# Patient Record
Sex: MALE | Race: BLACK OR AFRICAN AMERICAN | Employment: FULL TIME | URBAN - METROPOLITAN AREA
[De-identification: names, ages, dates, MRNs, and addresses within clinical notes are randomized per-mention and may not be internally consistent; named-entity substitution may affect disease eponyms.]

---

## 2019-02-20 ENCOUNTER — OFFICE VISIT (OUTPATIENT)
Dept: FAMILY MEDICINE CLINIC | Facility: CLINIC | Age: 29
End: 2019-02-20

## 2019-02-20 VITALS
HEART RATE: 67 BPM | DIASTOLIC BLOOD PRESSURE: 85 MMHG | SYSTOLIC BLOOD PRESSURE: 133 MMHG | TEMPERATURE: 98 F | WEIGHT: 190.6 LBS | HEIGHT: 72 IN | RESPIRATION RATE: 16 BRPM | OXYGEN SATURATION: 99 % | BODY MASS INDEX: 25.81 KG/M2

## 2019-02-20 DIAGNOSIS — M25.562 LEFT KNEE PAIN, UNSPECIFIED CHRONICITY: Primary | ICD-10-CM

## 2019-02-20 NOTE — PATIENT INSTRUCTIONS
Knee Pain or Injury: Care Instructions Your Care Instructions Injuries are a common cause of knee problems. Sudden (acute) injuries may be caused by a direct blow to the knee. They can also be caused by abnormal twisting, bending, or falling on the knee. Pain, bruising, or swelling may be severe, and may start within minutes of the injury. Overuse is another cause of knee pain. Other causes are climbing stairs, kneeling, and other activities that use the knee. Everyday wear and tear, especially as you get older, also can cause knee pain. Rest, along with home treatment, often relieves pain and allows your knee to heal. If you have a serious knee injury, you may need tests and treatment. Follow-up care is a key part of your treatment and safety. Be sure to make and go to all appointments, and call your doctor if you are having problems. It's also a good idea to know your test results and keep a list of the medicines you take. How can you care for yourself at home? · Be safe with medicines. Read and follow all instructions on the label. ? If the doctor gave you a prescription medicine for pain, take it as prescribed. ? If you are not taking a prescription pain medicine, ask your doctor if you can take an over-the-counter medicine. · Rest and protect your knee. Take a break from any activity that may cause pain. · Put ice or a cold pack on your knee for 10 to 20 minutes at a time. Put a thin cloth between the ice and your skin. · Prop up a sore knee on a pillow when you ice it or anytime you sit or lie down for the next 3 days. Try to keep it above the level of your heart. This will help reduce swelling. · If your knee is not swollen, you can put moist heat, a heating pad, or a warm cloth on your knee. · If your doctor recommends an elastic bandage, sleeve, or other type of support for your knee, wear it as directed.  
· Follow your doctor's instructions about how much weight you can put on your leg. Use a cane, crutches, or a walker as instructed. · Follow your doctor's instructions about activity during your healing process. If you can do mild exercise, slowly increase your activity. · Reach and stay at a healthy weight. Extra weight can strain the joints, especially the knees and hips, and make the pain worse. Losing even a few pounds may help. When should you call for help? Call 911 anytime you think you may need emergency care. For example, call if: 
  · You have symptoms of a blood clot in your lung (called a pulmonary embolism). These may include: 
? Sudden chest pain. ? Trouble breathing. ? Coughing up blood.  
 Call your doctor now or seek immediate medical care if: 
  · You have severe or increasing pain.  
  · Your leg or foot turns cold or changes color.  
  · You cannot stand or put weight on your knee.  
  · Your knee looks twisted or bent out of shape.  
  · You cannot move your knee.  
  · You have signs of infection, such as: 
? Increased pain, swelling, warmth, or redness. ? Red streaks leading from the knee. ? Pus draining from a place on your knee. ? A fever.  
  · You have signs of a blood clot in your leg (called a deep vein thrombosis), such as: 
? Pain in your calf, back of the knee, thigh, or groin. ? Redness and swelling in your leg or groin.  
 Watch closely for changes in your health, and be sure to contact your doctor if: 
  · You have tingling, weakness, or numbness in your knee.  
  · You have any new symptoms, such as swelling.  
  · You have bruises from a knee injury that last longer than 2 weeks.  
  · You do not get better as expected. Where can you learn more? Go to http://bozena-quincy.info/. Enter K195 in the search box to learn more about \"Knee Pain or Injury: Care Instructions. \" Current as of: September 23, 2018 Content Version: 11.9 © 6940-2498 [employer].  Care instructions adapted under license by [address] (which disclaims liability or warranty for this information). If you have questions about a medical condition or this instruction, always ask your healthcare professional. [address] any warranty or liability for your use of this information.

## 2019-02-20 NOTE — PROGRESS NOTES
02/20/19 
9:49 AM 
had concerns including Establish Care (pt here to establish care. pt reports L knee pain. would like Ortho  referral ). HISTORY OF PRESENT ILLNESS This is a 34 y.o. male Left knee pain The patient states he is experiencing worsening pain in the left knee. This has been worsening over one month. Left knee surgery 2 years ago. He fell in the sidewalk breaking the femur, requiring reconstructive surgery. This occurred in [place]. Feels worse walking up and down the stairs, up more pronounced He has been using Tylenol. He must perform pulling and pushing activity  at work. No paresthesias are admitted to. He has been taking Tylenol and OTC  NSAIDS with partial relief. No other complaints are voiced Current Outpatient Medications:  
  acetaminophen (TYLENOL PO), Take  by mouth., Disp: , Rfl:  
No Known Allergies Active Ambulatory Problems Diagnosis Date Noted  No Active Ambulatory Problems Resolved Ambulatory Problems Diagnosis Date Noted  No Resolved Ambulatory Problems No Additional Past Medical History Review of Systems All other systems reviewed and are negative. No results found for this or any previous visit. Visit Vitals /85 (BP 1 Location: Right arm, BP Patient Position: Sitting) Pulse 67 Temp 98 °F (36.7 °C) (Oral) Resp 16 Ht 6' (1.829 m) Wt 190 lb 9.6 oz (86.5 kg) SpO2 99% BMI 25.85 kg/m² Physical Exam  
Constitutional: He is oriented to person, place, and time. No distress. HENT:  
Head: Normocephalic and atraumatic. Nose: Nose normal.  
Mouth/Throat: Oropharynx is clear and moist.  
Eyes: Conjunctivae and EOM are normal. Pupils are equal, round, and reactive to light. No scleral icterus. Neck: No tracheal deviation present. No thyromegaly present. Cardiovascular: Normal rate, regular rhythm, normal heart sounds and intact distal pulses. Pulmonary/Chest: Effort normal and breath sounds normal. No respiratory distress. Abdominal: Bowel sounds are normal. He exhibits no distension. There is no tenderness. Musculoskeletal: He exhibits tenderness. He exhibits no edema. Medial scar Flexion 180 to 200 Medial pain to rotation Lymphadenopathy:  
  He has no cervical adenopathy. Neurological: He is alert and oriented to person, place, and time. No cranial nerve deficit. Skin: He is not diaphoretic. Nursing note and vitals reviewed. MDM Number of Diagnoses or Management Options Left knee pain, unspecified chronicity:  
  
Amount and/or Complexity of Data Reviewed Tests in the radiology section of CPT®: ordered ASSESSMENT and PLAN 
  ICD-10-CM ICD-9-CM 1. Left knee pain, unspecified chronicity M25.562 719.46 REFERRAL TO ORTHOPEDICS  
 left knee X ray Advised Motrin for pain Ortho referral  
 
Follow-up Disposition: Not on File 
the following changes in treatment are made: Recommended Motrin, 400 mg up to 4 times a day for pain

## 2019-02-20 NOTE — PROGRESS NOTES
Shruthi Meyer is a 34 y.o. male here to establish care Shruthi Meyer is a 34 y.o. male (: 1990) presenting to address: Chief Complaint Patient presents with  Establish Care  
  pt here to establish care. pt reports L knee pain. would like Ortho  referral   
 
 
Vitals:  
 19 0945 Resp: 16 Temp: (!) 67 °F (19.4 °C) TempSrc: Oral  
SpO2: 99% Weight: 190 lb 9.6 oz (86.5 kg) Height: 6' (1.829 m) PainSc:   7 PainLoc: Knee Hearing/Vision: No exam data present Learning Assessment:  
 
Learning Assessment 2019 PRIMARY LEARNER Patient HIGHEST LEVEL OF EDUCATION - PRIMARY LEARNER  GRADUATED HIGH SCHOOL OR GED  
BARRIERS PRIMARY LEARNER NONE  
CO-LEARNER CAREGIVER No  
PRIMARY LANGUAGE ENGLISH  
LEARNER PREFERENCE PRIMARY VIDEOS  
ANSWERED BY patient RELATIONSHIP SELF Depression Screening:  
 
3 most recent PHQ Screens 2019 Little interest or pleasure in doing things Not at all Feeling down, depressed, irritable, or hopeless Not at all Total Score PHQ 2 0 Fall Risk Assessment:  
No flowsheet data found. Abuse Screening: No flowsheet data found. Coordination of Care Questionaire: 1. Have you been to the ER, urgent care clinic since your last visit? Hospitalized since your last visit? NO 
 
2. Have you seen or consulted any other health care providers outside of the [address] [place] [address] since your last visit? Include any pap smears or colon screening. NO Advanced Directive: 1. Do you have an Advanced Directive? NO 
 
2. Would you like information on Advanced Directives?  NO

## 2019-03-11 ENCOUNTER — OFFICE VISIT (OUTPATIENT)
Dept: ORTHOPEDIC SURGERY | Age: 29
End: 2019-03-11

## 2019-03-11 VITALS
RESPIRATION RATE: 16 BRPM | BODY MASS INDEX: 33.81 KG/M2 | OXYGEN SATURATION: 97 % | SYSTOLIC BLOOD PRESSURE: 134 MMHG | HEIGHT: 72 IN | HEART RATE: 82 BPM | WEIGHT: 249.6 LBS | DIASTOLIC BLOOD PRESSURE: 84 MMHG | TEMPERATURE: 97.6 F

## 2019-03-11 DIAGNOSIS — S83.512D SPRAIN OF ANTERIOR CRUCIATE LIGAMENT OF LEFT KNEE, SUBSEQUENT ENCOUNTER: ICD-10-CM

## 2019-03-11 DIAGNOSIS — M25.562 LEFT KNEE PAIN, UNSPECIFIED CHRONICITY: Primary | ICD-10-CM

## 2019-03-11 DIAGNOSIS — S83.412D SPRAIN OF MEDIAL COLLATERAL LIGAMENT OF LEFT KNEE, SUBSEQUENT ENCOUNTER: ICD-10-CM

## 2019-03-11 RX ORDER — IBUPROFEN 200 MG
TABLET ORAL
COMMUNITY
End: 2019-05-22 | Stop reason: ALTCHOICE

## 2019-03-11 NOTE — PATIENT INSTRUCTIONS
Please follow up after MRI. You are advised to contact us if your condition worsens. An MRI or CT has been ordered for you. A [facility] will be contacting you to schedule the appointment as soon as it has been approved with your insurance company. Please schedule an appointment to follow up with the doctor or the physicians assistant after the MRI or CT has been conducted. Knee Pain or Injury: Care Instructions  Your Care Instructions    Injuries are a common cause of knee problems. Sudden (acute) injuries may be caused by a direct blow to the knee. They can also be caused by abnormal twisting, bending, or falling on the knee. Pain, bruising, or swelling may be severe, and may start within minutes of the injury. Overuse is another cause of knee pain. Other causes are climbing stairs, kneeling, and other activities that use the knee. Everyday wear and tear, especially as you get older, also can cause knee pain. Rest, along with home treatment, often relieves pain and allows your knee to heal. If you have a serious knee injury, you may need tests and treatment. Follow-up care is a key part of your treatment and safety. Be sure to make and go to all appointments, and call your doctor if you are having problems. It's also a good idea to know your test results and keep a list of the medicines you take. How can you care for yourself at home? · Be safe with medicines. Read and follow all instructions on the label. ? If the doctor gave you a prescription medicine for pain, take it as prescribed. ? If you are not taking a prescription pain medicine, ask your doctor if you can take an over-the-counter medicine. · Rest and protect your knee. Take a break from any activity that may cause pain. · Put ice or a cold pack on your knee for 10 to 20 minutes at a time. Put a thin cloth between the ice and your skin.   · Prop up a sore knee on a pillow when you ice it or anytime you sit or lie down for the next 3 days. Try to keep it above the level of your heart. This will help reduce swelling. · If your knee is not swollen, you can put moist heat, a heating pad, or a warm cloth on your knee. · If your doctor recommends an elastic bandage, sleeve, or other type of support for your knee, wear it as directed. · Follow your doctor's instructions about how much weight you can put on your leg. Use a cane, crutches, or a walker as instructed. · Follow your doctor's instructions about activity during your healing process. If you can do mild exercise, slowly increase your activity. · Reach and stay at a healthy weight. Extra weight can strain the joints, especially the knees and hips, and make the pain worse. Losing even a few pounds may help. When should you call for help? Call 911 anytime you think you may need emergency care. For example, call if:    · You have symptoms of a blood clot in your lung (called a pulmonary embolism). These may include:  ? Sudden chest pain. ? Trouble breathing. ? Coughing up blood.    Call your doctor now or seek immediate medical care if:    · You have severe or increasing pain.     · Your leg or foot turns cold or changes color.     · You cannot stand or put weight on your knee.     · Your knee looks twisted or bent out of shape.     · You cannot move your knee.     · You have signs of infection, such as:  ? Increased pain, swelling, warmth, or redness. ? Red streaks leading from the knee. ? Pus draining from a place on your knee. ? A fever.     · You have signs of a blood clot in your leg (called a deep vein thrombosis), such as:  ? Pain in your calf, back of the knee, thigh, or groin. ?  Redness and swelling in your leg or groin.    Watch closely for changes in your health, and be sure to contact your doctor if:    · You have tingling, weakness, or numbness in your knee.     · You have any new symptoms, such as swelling.     · You have bruises from a knee injury that last longer than 2 weeks.     · You do not get better as expected. Where can you learn more? Go to http://bozena-quincy.info/. Enter K195 in the search box to learn more about \"Knee Pain or Injury: Care Instructions. \"  Current as of: September 23, 2018  Content Version: 11.9  © 1282-6035 [employer]. Care instructions adapted under license by [employer] (which disclaims liability or warranty for this information). If you have questions about a medical condition or this instruction, always ask your healthcare professional. [address] any warranty or liability for your use of this information.

## 2019-03-11 NOTE — PROGRESS NOTES
AMBULATORY PROGRESS NOTE      Patient: Paulina Dye             MRN: [id number]     SSN: [id number] Body mass index is 33.85 kg/m². YOB: 1990     AGE: 34 y.o. EX: male    PCP: No primary care provider on file. IMPRESSION/DIAGNOSIS AND TREATMENT PLAN     DIAGNOSES  1. Left knee pain, unspecified chronicity    2. Sprain of anterior cruciate ligament of left knee, subsequent encounter    3. Sprain of medial collateral ligament of left knee, subsequent encounter        Orders Placed This Encounter    [[id number]] Knee 1-2V    [place] LT [address] understands his diagnoses and the proposed plan. Mr. Lina Zamora is a 43-year-old male who about two years ago had left ligament reconstruction surgery by a surgeon in [place]. My impression is that he has a residual contracture of the left knee and cannot fully extend the left knee. It is about less than 5° that he lacks in full terminal extension and his flexion arc is only to about 105°. There is a little bit of clicking of the knee when I try to circumduct the knee with him sitting in front of me. I detect no gross instability to anterior drawer. When I perform a valgus stress test to stress the medial collateral ligamentous structures, there is a definite end point, but he has discomfort when I get to that endpoint. Because of his continued pain and discomfort and an inability to flex the knee fully and extend the knee fully, recommendation is for an MRI of his knee to assess the collateral ligamentous structures, to assess the reconstructed ligaments of the knee. He is going to sign a medical release form or bring some medical notes from what he had before, so I can see what exact type of procedures were performed for the MCL. DIAGNOSTIC STUDIES:  X-rays of his knee show postoperative changes from [facility] and ACL reconstruction.   There are two screws in the medial femoral condyle region, one in the lateral femoral condyle region and one with some hardware in the tibia as well. Otherwise, I see no subluxation or dislocation on these x-rays of the knee. Plan:    1) Obtain medical records and operative report for left knee. 2) MRI without IV Contrast of the left knee. 3) Referral to Dr. Lisa Perrin. 4) Continue activity modification as directed. 5) Work Note: Please allow Mr. Segun Velazquez to work light duty and/or reduced shifts (approximately 3 hours/shift or 15 hours/week) for the next 2 weeks, until March 25th. He is to refrain from bending, lifting, or twisting. RTO - after MRI     HPI AND EXAMINATION     Shanti Ramirez IS A 34 y.o. male who presents to my outpatient office complaining of left knee pain. Mr. Segun Velazquez reports that he has been experiencing left knee pain for the past 2 years. He reports that 2 years ago, he had to have knee reconstruction surgery after he tripped on loose gravel and twisted his left knee, sustained an MCL and ACL injury. He had the surgery in [place]. He reports that he did have PT after his surgery. Ever since his surgery, Mr. Segun Velazquez reports that has experienced lack of ROM, pain, and swelling. He notes that his knee often feels warm, but denies it ever turning red. He finds that it is difficult to walk for prolonged periods of time, and that he experiences shakiness and weakness in his knee when going up stairs. He reports that it feels as if his knee is going to give out when doing these activities. He states that his knee always hurts after work, as well. In addition to this, Mr. Segun Velazquez reports that a few weeks ago, he was at [employer] [facility] when he tripped and fell on his left knee. He reports that he experienced pain after this injury, but he did not go to the ED, as he had just gotten out of work. The patient used cryotherapy after his injury. The patient works at [email or web address].      Visit Vitals  /84   Pulse 82   Temp 97.6 °F (36.4 °C) (Oral)   Resp 16   Ht 6' (1.829 m)   Wt 249 lb 9.6 oz (113.2 kg)   SpO2 97%   BMI 33.85 kg/m²      Appearance: Alert, well appearing and pleasant patient who is in no distress, oriented to person, place/time, and who follows commands. This patient is accompanied in the       office by his  father. Psychiatric: Affect and mood are appropriate. Cardiovascular/Peripheral Vascular: Normal Pulses to each hand and foot           Knees:  Left        Gait: antalgic         Cutaneous: Skin intact, no abrasions, blisters, wounds, erythema             Well healed, remote surgical scar to the medial knee. Effusion: Is not present        Crepitus:  no PF joint crepitus       Tenderness: Medial joint line    Alignment of Knee: neutral when standing        ROM: Lacks 5-10 degrees of full, terminal extension    Can achieve 105 degrees of extension before guarding occurs    Cannot make figure 4 with left leg    Less than 5 degrees of flexion when laying down        Fullness or swelling: Swelling present to the knee        Stability: No instability to anterior, posterior, varus stress testing        Popping with valgus stress test.                    Good end point        Thrust: No varus thrust with gait        Contractures: No Achilles or Gastrocnemius Contractures. Calf tenderness: Absent for calf or gastrocnemius muscle regions            Soft, supple, non tender, non taut lower extremity compartments  Extremities:   No embolic phenomena to the toes          No significant edema to the foot and or toes. Edema is not present to distal 1/3 tib/fib or ankle regions.         Lower extremities are warm and appear well perfused    DVT: No evidence of DVT seen on examination at this time     No calf swelling, no tenderness to calf muscles  Lymphatic:  No Evidence of Lymphedema  Vascular: Medial Border of Tibia Region: Edema is not present        Pulses: Dorsalis Pedis &  Posterior Tibial Pulses : Palpable yes        Varicosities Lower Limbs : None noted . Neuro: Negative bilateral Straight leg raise (seated position)    See Musculoskeletal section for pertinent individual extremity examination    No abnormal hand/wrist, foot/ankle, or facial/neck tremors. Extensor mechanism is intact    CHART REVIEW     No past medical history on file. Current Outpatient Medications   Medication Sig    ibuprofen (MOTRIN IB) 200 mg tablet Take  by mouth.  acetaminophen (TYLENOL PO) Take  by mouth. No current facility-administered medications for this visit. No Known Allergies  Past Surgical History:   Procedure Laterality Date    HX ORTHOPAEDIC      L knee surgery      Social History     Occupational History    Not on file   Tobacco Use    Smoking status: Never Smoker    Smokeless tobacco: Never Used   Substance and Sexual Activity    Alcohol use: Yes     Comment: social     Drug use: Not on file    Sexual activity: Yes     Partners: Male     Family History   Problem Relation Age of Onset    Cancer Neg Hx     Diabetes Neg Hx     Heart Disease Neg Hx     Hypertension Neg Hx         REVIEW OF SYSTEMS : 3/11/2019  ALL BELOW ARE Negative except : SEE HPI     CONSTITUTIONAL: denies chills, fatigue, fever, weight change   PSYCH: denies anxiety, depression, irritability or mood swings   ENT: denies - headaches, hearing change, nasal congestion, oral lesions, or sore throat   HEM/ONC denies - bleeding problems, bruising, pallor or swollen lymph nodes   ENDO: denies hot flashes, polydipsia/polyuria or temperature intolerance   RESP: denies - cough, shortness of breath or wheezing   CV: denies - chest pain, edema or palpitations, ROMANO  GI: denies - abdominal pain, change in bowel habits, constipation, diarrhea or nausea/vomiting   : denies - dysuria, hematuria, incontinence, pelvic pain   MSK: denies  - See HPI.   NEURO: denies - confusion, headaches, seizures or weakness   DERM: denies - dry skin, hair changes, rash or skin lesion changes  VASCULAR: Peripheral Vascular: No calf pain, vascular vein tenderness to calf pain              No calf throbbing, posterior knee throbbing pain      DIAGNOSTIC IMAGING     No notes on file    Please see above section of this report. I have personally reviewed the results of the above study. The interpretation of this study is my professional opinion. Written by Briana Johnston, as dictated by Dr. Ike Oviedo. I, Dr. Ike Oviedo, confirm that all documentation is accurate.

## 2019-03-11 NOTE — LETTER
NOTIFICATION RETURN TO WORK / SCHOOL 
 
3/11/2019 11:26 AM 
 
Mr. Brady Naranjo 
[address] [address] To Whom It May Concern: 
 
Brady Naranjo is currently under the care of [address] [place] [address] Ulysses Parham. Please allow Mr. Vita Martinez to work light duty and/or reduced shifts (approximately 3 hours/shift or 15 hours/week) for the next 2 weeks, until March 25th. He is to refrain from bending, lifting, or twisting. If there are questions or concerns please have the patient contact our office.  
 
 
 
Sincerely, 
 
 
Nasim Moreno MD

## 2019-03-14 ENCOUNTER — TELEPHONE (OUTPATIENT)
Dept: ORTHOPEDIC SURGERY | Age: 29
End: 2019-03-14

## 2019-03-14 NOTE — TELEPHONE ENCOUNTER
LMOVM notifying patient that films are ready for  at [facility] location.

## 2019-03-14 NOTE — TELEPHONE ENCOUNTER
Patient came to B location to request a printed out copy of X-ray image, does not want disc. Please contact patient when image is ready to be picked up at [phone].

## 2019-03-21 ENCOUNTER — DOCUMENTATION ONLY (OUTPATIENT)
Dept: ORTHOPEDIC SURGERY | Age: 29
End: 2019-03-21

## 2019-03-22 ENCOUNTER — TELEPHONE (OUTPATIENT)
Dept: FAMILY MEDICINE CLINIC | Facility: CLINIC | Age: 29
End: 2019-03-22

## 2019-03-22 ENCOUNTER — HOSPITAL ENCOUNTER (OUTPATIENT)
Age: 29
Discharge: HOME | End: 2019-03-22
Attending: ORTHOPAEDIC SURGERY
Payer: COMMERCIAL

## 2019-03-22 DIAGNOSIS — M25.562 LEFT KNEE PAIN, UNSPECIFIED CHRONICITY: ICD-10-CM

## 2019-03-22 PROCEDURE — 73721 MRI JNT OF LWR EXTRE W/O DYE: CPT

## 2019-03-22 NOTE — TELEPHONE ENCOUNTER
Patient states he called facility where medical records were to be sent from and they said they have not received the request.  Please fax again.

## 2019-03-22 NOTE — TELEPHONE ENCOUNTER
Spoke with facility, fax number given. Medical release form re-faxed to facility.  Confirmation received

## 2019-03-22 NOTE — TELEPHONE ENCOUNTER
Patient returned nurse call. I informed him of the message below and advised the patient to contact his previous provider to ensure that they received the request and to see if he could get the request expedited. Patient will reach out to previous provider in [place] and update us as needed.

## 2019-04-02 ENCOUNTER — OFFICE VISIT (OUTPATIENT)
Dept: ORTHOPEDIC SURGERY | Age: 29
End: 2019-04-02

## 2019-04-02 VITALS
HEART RATE: 80 BPM | DIASTOLIC BLOOD PRESSURE: 78 MMHG | RESPIRATION RATE: 16 BRPM | WEIGHT: 252 LBS | HEIGHT: 72 IN | SYSTOLIC BLOOD PRESSURE: 135 MMHG | BODY MASS INDEX: 34.13 KG/M2 | OXYGEN SATURATION: 98 % | TEMPERATURE: 98 F

## 2019-04-02 DIAGNOSIS — S83.282A ACUTE LATERAL MENISCUS TEAR OF LEFT KNEE, INITIAL ENCOUNTER: Primary | ICD-10-CM

## 2019-04-02 DIAGNOSIS — M24.662 ARTHROFIBROSIS OF KNEE JOINT, LEFT: ICD-10-CM

## 2019-04-02 DIAGNOSIS — Z98.890 HISTORY OF REPAIR OF ANTERIOR CRUCIATE LIGAMENT OF LEFT KNEE: ICD-10-CM

## 2019-04-02 DIAGNOSIS — S83.242A ACUTE MEDIAL MENISCUS TEAR, LEFT, INITIAL ENCOUNTER: ICD-10-CM

## 2019-04-02 NOTE — PROGRESS NOTES
Xavier Reyes 1990 Chief Complaint Patient presents with  Knee Pain  
  left knee f/u HISTORY OF PRESENT ILLNESS Xavier Reyes is a 34 y.o. male who presents today for evaluation of left knee pain. The patient was referred by Dr. Jose Ortega. He rates his pain 6/10 today. Pain has been present for awhile. He states he has recurrent swelling. He states the knee is tight and feeling like its going to pop. He had surgery in July 2017 for ACL reconstruction. He recently got medial insurance back. He works at [employer]. Patient describes the pain as aching, sharp and dull that is Intermittent in nature. Symptoms are worse with walking and standing , Activity and is better with  Rest, Ice, Elevation. Associated symptoms include Swelling. Since problem started, it: has worsened. Pain does not wake patient up at night. Has taken no meds for the problem. Has tried following treatments: Injections:NO; Brace:NO; Therapy:YES; Cane/Crutch:NO No Known Allergies History reviewed. No pertinent past medical history. Social History Socioeconomic History  Marital status: UNKNOWN Spouse name: Not on file  Number of children: Not on file  Years of education: Not on file  Highest education level: Not on file Occupational History  Not on file Social Needs  Financial resource strain: Not on file  Food insecurity:  
  Worry: Not on file Inability: Not on file  Transportation needs:  
  Medical: Not on file Non-medical: Not on file Tobacco Use  Smoking status: Never Smoker  Smokeless tobacco: Never Used Substance and Sexual Activity  Alcohol use: Yes Comment: social   
 Drug use: Not on file  Sexual activity: Yes  
  Partners: Male Lifestyle  Physical activity:  
  Days per week: Not on file Minutes per session: Not on file  Stress: Not on file Relationships  Social connections: Talks on phone: Not on file Gets together: Not on file Attends Anabaptist service: Not on file Active member of club or organization: Not on file Attends meetings of clubs or organizations: Not on file Relationship status: Not on file  Intimate partner violence:  
  Fear of current or ex partner: Not on file Emotionally abused: Not on file Physically abused: Not on file Forced sexual activity: Not on file Other Topics Concern  Not on file Social History Narrative  Not on file Past Surgical History:  
Procedure Laterality Date  HX ORTHOPAEDIC    
 L knee surgery Family History Problem Relation Age of Onset  Cancer Neg Hx  Diabetes Neg Hx   
 Heart Disease Neg Hx  Hypertension Neg Hx Current Outpatient Medications Medication Sig  ibuprofen (MOTRIN IB) 200 mg tablet Take  by mouth.  acetaminophen (TYLENOL PO) Take  by mouth. No current facility-administered medications for this visit. REVIEW OF SYSTEM Patient denies: Weight loss, Fever/Chills, HA, Visual changes, Fatigue, Chest pain, SOB, Abdominal pain, N/V/D/C, Blood in stool or urine, Edema. Pertinent positive as above in HPI. All others were negative PHYSICAL EXAM:  
Visit Vitals /78 Pulse 80 Temp 98 °F (36.7 °C) (Oral) Resp 16 Ht 6' (1.829 m) Wt 252 lb (114.3 kg) SpO2 98% BMI 34.18 kg/m² The patient is a well-developed, well-nourished male   in no acute distress. The patient is alert and oriented times three. The patient is alert and oriented times three. Mood and affect are normal. 
LYMPHATIC: lymph nodes are not enlarged and are within normal limits SKIN: normal in color and non tender to palpation. There are no bruises or abrasions noted. NEUROLOGICAL: Motor sensory exam is within normal limits. Reflexes are equal bilaterally. There is normal sensation to pinprick and light touch MUSCULOSKELETAL: 
Examination Left knee Skin Intact Range of motion  Effusion + Medial joint line tenderness + Lateral joint line tenderness - Tenderness Pes Bursa - Tenderness insertion MCL - Tenderness insertion LCL - Bens -  
Patella crepitus + Patella grind + Lachman - Pivot shift - Anterior drawer - Posterior drawer -  
Varus stress -  
Valgus stress - Neurovascular Intact Calf Swelling and Tenderness to Palpation -  
Radha's Test -  
Hamstring Cord Tightness - IMAGING: MRI of left knee dated 3/22/19 was reviewed and read: Impression:  
1. Surgical changes of prior ACL and MCL reconstruction with tendon grafts which appear intact. 2. 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 3. Medial and lateral meniscal tears as discussed. 4. Low-grade chondrosis in the lateral compartment. IMPRESSION:   
  ICD-10-CM ICD-9-CM 1. Acute lateral meniscus tear of left knee, initial encounter S83.282A 836.1 2. Acute medial meniscus tear, left, initial encounter S83.242A 836.0 3. History of repair of anterior cruciate ligament of left knee Z98.890 V45.89   
4. Arthrofibrosis of knee joint, left M24.662 718.56 PLAN: 
1. I discussed the risks and benefits and potential adverse outcomes of both operative vs non operative treatment of left medial and lateral meniscus tear and arthrofibrosis of the knee with the patient. Patient wishes to proceed with arthroscopic left medial and lateral meniscectomy, lysis of adhesions, and manipulation under anesthesia. Risks of operative intervention include but not limited to bleeding, infection, deep vein thrombosis, pulmonary embolism, death, limb length discrepancy, reflexive sympathetic dystrophy, fat embolism syndrome,damage to blood vessels and nerves, malunion, non-union, delayed union, failure of hardware, post traumatic arthritis, stroke, heart attack, and death. Patient understands that infection may arise and may require numerous surgeries. History and physical exam scheduled for a later date. Risk factors include: n/a 2. No ultrasound exam indicated today 3. No cortisone injection indicated today 4. No Physical/Occupational Therapy indicated today 5. No diagnostic test indicated today: 6. No durable medical equipment indicated today 7. No referral indicated today 8. No medications indicated today: 9. No Narcotic indicated today RTC H&P Office note will be sent to referring provider. Scribed by Zen Mars ([address] [place] [address] [address]) as dictated by Ramiro Chow MD 
 
I, Dr. Ramiro Chow, confirm that all documentation is accurate.  
 
Ramiro Chow M.D.  
[facility]

## 2019-04-05 ENCOUNTER — HOSPITAL ENCOUNTER (OUTPATIENT)
Dept: PHYSICAL THERAPY | Age: 29
Discharge: HOME | End: 2019-04-05
Payer: COMMERCIAL

## 2019-04-05 PROCEDURE — 97161 PT EVAL LOW COMPLEX 20 MIN: CPT

## 2019-04-05 PROCEDURE — 97110 THERAPEUTIC EXERCISES: CPT

## 2019-04-05 NOTE — PROGRESS NOTES
PT DAILY TREATMENT NOTE/KNEE EVAL 10-18    Patient Name: Yecenia Cardenas  Date:2019  : 1990  [x]  Patient  Verified  Payor: [address] / Plan: [address] / Product Type: Managed Care Medicaid /    In time:215  Out time:255  Total Treatment Time (min): 40  Visit #: 1 of 10    Medicare/BCBS Only   Total Timed Codes (min):  0 1:1 Treatment Time:  0     Treatment Area: Pain in left knee [M25.562]    SUBJECTIVE  Pain Level (0-10 scale): 5  [x]constant []intermittent []improving []worsening [x]no change since onset  WORSE walking, bending, running, some times the way he sleeps BETTER lying down on his right side  Any medication changes, allergies to medications, adverse drug reactions, diagnosis change, or new procedure performed?: [x] No    [] Yes (see summary sheet for update)  Subjective functional status/changes:     PLOF: I all areas of ADLS and activities, no AD , working, household chores and community activities, [employer] activities  Limitations to PLOF: pain and decreased ROM  Mechanism of Injury: onset due to a fall 17, post op 17 with continued issues   Current symptoms/Complaints: 33 YO male diagnosed as above and with S/S consistent with above diagnosis presents to skilled outpatient PT. CCO pain in the left knee and unable to bend it all the way. Reports medial joint line pain with prolonged walking. The left knee feels like it wants to give out especially with attempts to try and run.    Previous Treatment/Compliance: ortho, surgery  ORIF- ACL and MCL repair, post op PT, MRI , motrin, ice  PMHx/Surgical Hx: none  Work Hx: part time [employer]  Living Situation: apartment 2nd floor, no elevator, alone  Pt Goals: to be able to bend my knee all the way and run again  Barriers: [x]pain []financial []time []transportation []other  Motivation: good  Substance use: []Alcohol []Tobacco []other:   FABQ Score: []low []elevate  Cognition: A & O x 4 Other: OBJECTIVE/EXAMINATION  Domestic Life: work and community activities, household chores, [employer]  Activity/Recreational Limitations: pain and limited motion  Mobility: I  Self Care: I        Modality rationale:     Min Type Additional Details    [] Estim:  []Unatt       []IFC  []Premod                        []Other:  []w/ice   []w/heat  Position:  Location:    [] Estim: []Att    []TENS instruct  []NMES                    []Other:  []w/US   []w/ice   []w/heat  Position:  Location:    []  Traction: [] Cervical       []Lumbar                       [] Prone          []Supine                       []Intermittent   []Continuous Lbs:  [] before manual  [] after manual    []  Ultrasound: []Continuous   [] Pulsed                           []1MHz   []3MHz Location:  W/cm2:    []  Iontophoresis with dexamethasone         Location: [] Take home patch   [] In clinic    []  Ice     []  heat  []  Ice massage  []  Laser   []  Anodyne Position:  Location:    []  Laser with stim  []  Other: Position:  Location:    []  Vasopneumatic Device Pressure:       [] lo [] med [] hi   Temperature: [] lo [] med [] hi   [] Skin assessment post-treatment:  []intact []redness- no adverse reaction    []redness - adverse reaction:     32 min [x]Eval                  []Re-Eval       8 min Therapeutic Exercise:  [x] See flow sheet :   Rationale: increase ROM, increase strength and improve coordination to improve the patients ability to aid with increase tolerance to ADLS and activities     min Therapeutic Activity:  []  See flow sheet :   Rationale:   to improve the patients ability to       min Neuromuscular Re-education:  []  See flow sheet :   Rationale:   to improve the patients ability to      min Manual Therapy:     Rationale:  to      min Gait Training:  ___ feet with ___ device on level surfaces with ___ level of assist   Rationale:           With   [] TE   [] TA   [] neuro   [] other: Patient Education: [x] Review HEP    [] Progressed/Changed HEP based on:   [] positioning   [] body mechanics   [] transfers   [] heat/ice application    [] other:      Other Objective/Functional Measures: FALLS RISK is low, no AD use, no LOB observed. Some fear of falling due to a recent slip and fall on a wet floor and also due to left knee giving out at times.      Physical Therapy Evaluation - Knee    Posture: [] Varus    [] Valgus    [] Recurvatum        [] Tibial Torsion    [] Foot Supination    [] Foot Pronation    Describe:    Gait:  [] Normal    [] Abnormal    [] Antalgic    [] NWB    Device:    Describe:    ROM / Strength  [] Unable to assess                  AROM                      PROM                   Strength (1-5)    Left Right Left Right Left Right   Hip Flexion [facility]   4 4+    Extension          Abduction          Adduction         Knee Flexion 83 sitting  103 supine 110 sitting   4 5    Extension -20 sitting  18 supine 0 sitting   4 5   Ankle Plantarflexion          Dorsiflexion             Flexibility: [] Unable to assess at this time  Hamstrings:    (L) Tightness= [] WNL   [] Min   [] Mod   [] Severe    (R) Tightness= [] WNL   [] Min   [] Mod   [] Severe  Quadriceps:    (L) Tightness= [] WNL   [] Min   [] Mod   [] Severe    (R) Tightness= [] WNL   [] Min   [] Mod   [] Severe  Gastroc:      (L) Tightness= [] WNL   [] Min   [] Mod   [] Severe    (R) Tightness= [] WNL   [] Min   [] Mod   [] Severe  Other:    Palpation: point tender to palpation medial joint line left knee   Neg/Pos  Neg/Pos  Neg/Pos   Joint Line  Quad tendon  Patellar ligament    Patella  Fibular head  Pes Anserinus    Tibial tubercle  Hamstring tendons  Infrapatellar fat pad      Optional Tests:  Patellar Positioning (Static)   []L []R Normal []L []R Lateral   []L []R Parvin Nim      []L []R Medial   []L []R Baja    Patellar Tracking   []L []R Glide (Lat)   []L []R Tilt (Lat)     []L []R Glide (Med)  []L []R Tilt (Med)      []L []R Tile (Inf)     Patellar Mobility   []L []R Hypermobile [x]L []R Hypomobile         Girth Measurements:     Cm at  Cm above joint line   Cm at   Cm below joint line  Cm at joint line   Left        Right           Lachmans  [] Neg    [] Pos Posterior Drawer [] Neg    [] Pos  Pivot Shift  [] Neg    [] Pos Posterior Sag  [] Neg    [] Pos  DANDRE   [] Neg    [] Pos Shaun's Test [] Neg    [] Pos  ALRI   [] Neg    [] Pos Squat   [] Neg    [] Pos  Valgus@ 0 Degrees [] Neg    [] Pos Ben-Shemar [] Neg    [] Pos  Valgus@ 30 Degrees [] Neg    [] Pos Patellar Apprehension [] Neg    [] Pos  Varus@ 0 Degrees [] Neg    [] Pos Granados's Compression [] Neg    [] Pos  Varus@ 30 Degrees [] Neg    [] Pos Ely's Test  [] Neg    [] Pos  Apley's Compression [] Neg    [] Pos Zara's Test  [] Neg    [] Pos  Apley's Distraction [] Neg    [] Pos Stroke Test  [] Neg    [] Pos   Anterior Drawer [] Neg    [] Pos Fluctuation Test [] Neg    [] Pos  Other:                  [] Neg    [] Pos                 Other tests/comments: quad set right good, left fair  Crepitus left minimal, right none       Pain Level (0-10 scale) post treatment: 5    ASSESSMENT/Changes in Function: Patient demonstrates the potential to make gains with improved ROM, strength, endurance/activity tolerance, functional FOTO survey score  and all within a reasonable time frame so as to increase their functional independence with ADLs and activities for carryover to  Improved quality of life, tolerance to household chores and community activities, and work demands. Patient requires skilled Physical Therapy so as to monitor their response to and modify their treatment plan accordingly. Patient appears to be an appropriate candidate for skilled outpatient Physical Therapy.     Patient will continue to benefit from skilled PT services to modify and progress therapeutic interventions, address functional mobility deficits, address ROM deficits, address strength deficits, analyze and address soft tissue restrictions, analyze and cue movement patterns, analyze and modify body mechanics/ergonomics, assess and modify postural abnormalities and instruct in home and community integration to attain remaining goals.      [x]  See Plan of Care  []  See progress note/recertification  []  See Discharge Summary         Progress towards goals / Updated goals:       PLAN  [x]  Upgrade activities as tolerated     [x]  Continue plan of care  []  Update interventions per flow sheet       []  Discharge due to:_  []  Other:_      Ada Kennedy, PT 4/5/2019  2:04 PM

## 2019-04-05 NOTE — PROGRESS NOTES
[facility] [address], [address] [place] [address], [address]  [phone] fax      Plan of Care/ Statement of Necessity for Physical Therapy Services    Patient name: Bailey Caballero Start of Care: 2019   Referral source: Angel Luis Laura,* : 1990    Medical Diagnosis: Pain in left knee [M25.562]  Payor: [address] [place] [address] Road / Plan: [address] / Product Type: Managed Care Medicaid /  Onset Date:2017, P/O 2019    Treatment Diagnosis: decrease tolerance to ADLs and activities due to left knee pain, LOM, decrease strength     Prior Hospitalization: see medical history Provider#: [id number]   Medications: Verified on Patient summary List    Comorbidities: none with exception left knee surgery   Prior Level of Function:  I all areas of ADLS and activities, no AD , working, household chores and community activities, [employer] activities   The Plan of Care and following information is based on the information from the initial evaluation. Assessment/ key information:  33 YO male diagnosed as above and with S/S consistent with above diagnosis presents to skilled outpatient PT. CCO pain in the left knee and unable to bend it all the way. Reports medial joint line pain with prolonged walking. The left knee feels like it wants to give out especially with attempts to try and run. Previous Treatment/Compliance: ortho, surgery  ORIF- ACL and MCL repair, post op PT, MRI , motrin, ice  Pain 5, FOTO 47, I ambulation, no AD use. FALLS RISK is low, no AD use, no LOB observed. Some fear of falling due to a recent slip and fall on a wet floor and also due to left knee giving out at times.    ROM / Strength  [] Unable to assess                  AROM                      PROM                   Strength (1-5)      Left Right Left Right Left Right   Hip Flexion [facility]     4 4+     Extension                 Abduction                 Adduction               Knee Flexion 83 sitting  103 supine 110 sitting     4 5     Extension -20 sitting  18 supine 0 sitting     4 5   Ankle Plantarflexion                 Dorsiflexion                   point tender to palpation medial joint line left knee  Left patella hypomobile. quad set right good, left fair  Crepitus left minimal, right none                Patient demonstrates the potential to make gains with improved ROM, strength, endurance/activity tolerance, functional FOTO survey score  and all within a reasonable time frame so as to increase their functional independence with ADLs and activities for carryover to  Improved quality of life, tolerance to household chores and community activities, and work demands. Patient requires skilled Physical Therapy so as to monitor their response to and modify their treatment plan accordingly.  Patient appears to be an appropriate candidate for skilled outpatient Physical Therapy  Evaluation Complexity History MEDIUM  Complexity : 1-2 comorbidities / personal factors will impact the outcome/ POC ; Examination MEDIUM Complexity : 3 Standardized tests and measures addressing body structure, function, activity limitation and / or participation in recreation  ;Presentation LOW Complexity : Stable, uncomplicated  ;Clinical Decision Making MEDIUM Complexity : FOTO score of 26-74  Overall Complexity Rating: LOW   Problem List: pain affecting function, decrease ROM, decrease strength, edema affecting function, impaired gait/ balance, decrease ADL/ functional abilitiies, decrease activity tolerance, decrease flexibility/ joint mobility, decrease transfer abilities and other FOTO 47   Treatment Plan may include any combination of the following: Therapeutic exercise, Therapeutic activities, Neuromuscular re-education, Physical agent/modality, Gait/balance training, Manual therapy, Patient education, Self Care training, Functional mobility training, Home safety training and Stair training  Patient / Family readiness to learn indicated by: asking questions, trying to perform skills and interest  Persons(s) to be included in education: patient (P)  Barriers to Learning/Limitations: None  Patient Goal (s):  to be able to bend my knee all the way and run again      Patient Self Reported Health Status: excellent  Rehabilitation Potential: good    Short Term Goals: To be accomplished in 5 treatments:   1 patient will have established and be I with HEP to aid with progression of skilled PT program   EVAL issued   CURRENT   2 patient will have pain 3/10 to aid with increase tolerance to work demands   EVAL 5   CURRENT  Long Term Goals: To be accomplished in 10 treatments:   1 patient will have pain 1-2/10 to aid with increase tolerance to work demands   EVAL 5   CURRENT   2 patient will have MMT Left knee F/E to aid with increase stability for walking and standing   EVAL instability reports and MMT left knee F/E 4/4   CURRENT   3 patient will have reports of 50% improvement for carryover to ascending/descending 1 flight of stairs   EVAL quite a bit of diffiiculty   CURRENT   4 patient will have FOTO 68 to show improved tolerance to regular work and household activities   EVAL 47 a little bit of difficulty   CURRENT  Frequency / Duration: Patient to be seen 2-3 times per week for 10 treatments. Patient/ Caregiver education and instruction: Diagnosis, prognosis, self care, activity modification and exercises   [x]  Plan of care has been reviewed with PTA    Sarah Aquino, PT 4/5/2019 2:14 PM  ________________________________________________________________________    I certify that the above Therapy Services are being furnished while the patient is under my care. I agree with the treatment plan and certify that this therapy is necessary.     Physician's Signature:____________Date:_________TIME:________    [employer], Date and Time must be completed for valid certification **      Please sign and return to [facility] [address]  [address] [place] [address], [address] [place] [address], [address]  [phone] fax

## 2019-04-19 ENCOUNTER — HOSPITAL ENCOUNTER (OUTPATIENT)
Dept: PHYSICAL THERAPY | Age: 29
Discharge: HOME | End: 2019-04-19
Payer: COMMERCIAL

## 2019-04-19 PROCEDURE — 97110 THERAPEUTIC EXERCISES: CPT

## 2019-04-19 PROCEDURE — 97140 MANUAL THERAPY 1/> REGIONS: CPT

## 2019-04-19 PROCEDURE — 97016 VASOPNEUMATIC DEVICE THERAPY: CPT

## 2019-04-23 ENCOUNTER — APPOINTMENT (OUTPATIENT)
Dept: PHYSICAL THERAPY | Age: 29
End: 2019-04-23
Payer: COMMERCIAL

## 2019-04-26 ENCOUNTER — HOSPITAL ENCOUNTER (OUTPATIENT)
Dept: PHYSICAL THERAPY | Age: 29
Discharge: HOME | End: 2019-04-26
Payer: COMMERCIAL

## 2019-04-26 PROCEDURE — 97110 THERAPEUTIC EXERCISES: CPT | Performed by: PHYSICAL THERAPIST

## 2019-04-26 PROCEDURE — 97140 MANUAL THERAPY 1/> REGIONS: CPT | Performed by: PHYSICAL THERAPIST

## 2019-04-26 PROCEDURE — 97530 THERAPEUTIC ACTIVITIES: CPT | Performed by: PHYSICAL THERAPIST

## 2019-04-26 NOTE — PROGRESS NOTES
PT DAILY TREATMENT NOTE 10-18    Patient Name: Elieser Velasco  Date:2019  : 1990  [x]  Patient  Verified  Payor: [address] / Plan: [address] / Product Type: Managed Care Medicaid /    In time:2:12P  Out time:2:40  Total Treatment Time (min): 30 min  Visit #: 3 of 10    Medicare/BCBS Only   Total Timed Codes (min):   1:1 Treatment Time:         Treatment Area: Pain in left knee [M25.562]    SUBJECTIVE  Pain Level (0-10 scale): 5/10  Any medication changes, allergies to medications, adverse drug reactions, diagnosis change, or new procedure performed?: [x] No    [] Yes (see summary sheet for update)  Subjective functional status/changes:   [] No changes reported  Patient states he has constant pain of 5/10 pain. Discussed ongoing injury and perception of pain. OBJECTIVE    Modality rationale: decrease edema and decrease inflammation to improve the patients ability to increase activity tolerance.    Min Type Additional Details    [] Estim:  []Unatt       []IFC  []Premod                        []Other:  []w/ice   []w/heat  Position:  Location:    [] Estim: []Att    []TENS instruct  []NMES                    []Other:  []w/US   []w/ice   []w/heat  Position:  Location:    []  Traction: [] Cervical       []Lumbar                       [] Prone          []Supine                       []Intermittent   []Continuous Lbs:  [] before manual  [] after manual    []  Ultrasound: []Continuous   [] Pulsed                           []1MHz   []3MHz W/cm2:  Location:    []  Iontophoresis with dexamethasone         Location: [] Take home patch   [] In clinic   10 [x]  Ice     []  heat  []  Ice massage  []  Laser   []  Anodyne Position: long sit  Location: L knee    []  Laser with stim  []  Other:  Position:  Location:    []  Vasopneumatic Device Pressure:       [] lo [] med [] hi   Temperature: [] lo [] med [] hi   [] Skin assessment post-treatment:  []intact []redness- no adverse reaction    []redness - adverse reaction:     10 min Therapeutic Exercise:  [x] See flow sheet :   Rationale: increase ROM and increase strength to improve the patients ability to increase A/ROM and decrease pain with movement. 10 min Therapeutic Activity:  [x]  See flow sheet :   Rationale: increase strength and improve coordination  to improve the patients ability to Tolerate basic ADLs and job-related tasks without pain. 10 min Manual Therapy:  Grade 4 patello-femoral mobs   Rationale: increase tissue extensibility and correct positional vertigo to perform activities with good form and proprioception with tactile and verbal cuing appropriately. With   [x] TE   [x] TA   [] neuro   [] other: Patient Education: [x] Review HEP    [x] Progressed/Changed HEP based on:   [x] positioning   [x] body mechanics   [] transfers   [] heat/ice application    [] other:      Other Objective/Functional Measures: hypomobility of L patella compared to R      Pain Level (0-10 scale) post treatment: 3/10    ASSESSMENT/Changes in Function: Patient continues to have persistent pain and limits self based on this with elevated fear avoidance belief. Encouraged his to get back to cardio at the gym at a consistent frequency. Patient will continue to benefit from skilled PT services to modify and progress therapeutic interventions, address functional mobility deficits, address ROM deficits, analyze and address soft tissue restrictions, analyze and cue movement patterns, analyze and modify body mechanics/ergonomics and assess and modify postural abnormalities to attain remaining goals.      [x]  See Plan of Care  []  See progress note/recertification  []  See Discharge Summary         Progress towards goals / Updated goals:  Short Term Goals: To be accomplished in 5 treatments:               1 patient will have established and be I with HEP to aid with progression of skilled PT program               EVAL issued               ZQQTHWD: Met- pt reports compliance 4/19/19, 4/26/19               2 patient will have pain 3/10 to aid with increase tolerance to work demands               EVAL 5               CURRENT 5/10 4/26/19  Long Term Goals: To be accomplished in 10 treatments:               1 patient will have pain 1-2/10 to aid with increase tolerance to work demands               EVAL 5               CURRENT               2 patient will have MMT Left knee F/E to aid with increase stability for walking and standing               EVAL instability reports and MMT left knee F/E 4/4               CURRENT               3 patient will have reports of 50% improvement for carryover to ascending/descending 1 flight of stairs               EVAL quite a bit of diffiiculty               FESGHLE               4 patient will have FOTO 68 to show improved tolerance to regular work and household activities               EVAL 47 a little bit of difficulty   Current:    PLAN  [x]  Upgrade activities as tolerated     []  Continue plan of care  []  Update interventions per flow sheet       []  Discharge due to:_  []  Other:_      Terrance Carter, PT 4/26/2019  3:12 PM    Future Appointments   Date Time Provider Duane Jacobs   5/3/2019  3:00 PM Anu Keller PTA [facility]   5/10/2019  2:00 PM Anu Keller PTA [facility]   5/14/2019 10:40 AM My Ball MD [facility] 69   5/20/2019  9:30 AM Kerry Ochoa MD [facility] 10.

## 2019-05-03 ENCOUNTER — HOSPITAL ENCOUNTER (OUTPATIENT)
Dept: PHYSICAL THERAPY | Age: 29
Discharge: HOME | End: 2019-05-03
Payer: COMMERCIAL

## 2019-05-03 PROCEDURE — 97110 THERAPEUTIC EXERCISES: CPT

## 2019-05-03 PROCEDURE — 97016 VASOPNEUMATIC DEVICE THERAPY: CPT

## 2019-05-03 PROCEDURE — 97140 MANUAL THERAPY 1/> REGIONS: CPT

## 2019-05-03 NOTE — PROGRESS NOTES
PT DAILY TREATMENT NOTE 10-18    Patient Name: Meagan Barth  Date:5/3/2019  : 1990  [x]  Patient  Verified  Payor: [address] [place] [address] Road / Plan: [address] / Product Type: Managed Care Medicaid /    In time:2:06  Out time:2:47  Total Treatment Time (min): 41  Visit #: 4 of 10    Medicare/BCBS Only   Total Timed Codes (min):  31 1:1 Treatment Time:  31       Treatment Area: Pain in left knee [M25.562]    SUBJECTIVE  Pain Level (0-10 scale): 5  Any medication changes, allergies to medications, adverse drug reactions, diagnosis change, or new procedure performed?: [x] No    [] Yes (see summary sheet for update)  Subjective functional status/changes:   [] No changes reported  States he is still having the pain    OBJECTIVE    Modality rationale: decrease edema, decrease inflammation and decrease pain to improve the patients ability to ease with tolerance to ADLs   Min Type Additional Details    [] Estim:  []Unatt       []IFC  []Premod                        []Other:  []w/ice   []w/heat  Position:  Location:    [] Estim: []Att    []TENS instruct  []NMES                    []Other:  []w/US   []w/ice   []w/heat  Position:  Location:    []  Traction: [] Cervical       []Lumbar                       [] Prone          []Supine                       []Intermittent   []Continuous Lbs:  [] before manual  [] after manual    []  Ultrasound: []Continuous   [] Pulsed                           []1MHz   []3MHz W/cm2:  Location:    []  Iontophoresis with dexamethasone         Location: [] Take home patch   [] In clinic    []  Ice     []  heat  []  Ice massage  []  Laser   []  Anodyne Position:  Location:    []  Laser with stim  []  Other:  Position:  Location:   10 [x]  Vasopneumatic Device Pressure:       [] lo [] med [x] hi   Temperature: [] lo [x] med [] hi   [] Skin assessment post-treatment:  []intact []redness- no adverse reaction    []redness - adverse reaction:       23 min Therapeutic Exercise:  [x] See flow sheet :   Rationale: increase ROM, increase strength and increase proprioception to improve the patients ability to perform daily household chores. 8 min Manual Therapy:  Patellar mobs , passive ROM with overstretch flexion/ extension   Rationale: decrease pain, increase ROM and increase tissue extensibility to assist with community ambulation              With   [] TE   [] TA   [] neuro   [] other: Patient Education: [x] Review HEP    [] Progressed/Changed HEP based on:   [] positioning   [] body mechanics   [] transfers   [] heat/ice application    [] other:      Other Objective/Functional Measures:   Performed all therex well  Added leg press with good tolerance and proper form , 70 #, states he does about 70 at the gym    Pain Level (0-10 scale) post treatment: <5    ASSESSMENT/Changes in Function: Patient continues to show improvements with signs/ symptoms however still demonstrates a decrease in strength, impaired ROM, deficits with balance and an increase in pain with functional activities. Patient will continue to benefit from skilled PT services to modify and progress therapeutic interventions, address functional mobility deficits, address strength deficits, analyze and cue movement patterns and analyze and modify body mechanics/ergonomics to attain remaining goals.      [x]  See Plan of Care  []  See progress note/recertification  []  See Discharge Summary         Progress towards goals / Updated goals:  Short Term Goals: To be accomplished in 5 treatments:               1 patient will have established and be I with HEP to aid with progression of skilled PT program               EVAL issued               FXRLSUO: Met- pt reports compliance 4/19/19, 4/26/19               2 patient will have pain 3/10 to aid with increase tolerance to work demands               EVAL 5               PHYQZFE 5/10 4/26/19  Long Term Goals: To be accomplished in 10 treatments:               1 patient will have pain 1-2/10 to aid with increase tolerance to work demands               EVAL 5               CURRENT               2 patient will have MMT Left knee F/E to aid with increase stability for walking and standing               EVAL instability reports and MMT left knee F/E 4/4               CURRENT               3 patient will have reports of 50% improvement for carryover to ascending/descending 1 flight of stairs               EVAL quite a bit of diffiiculty               HACYUKF               4 patient will have FOTO 68 to show improved tolerance to regular work and household activities               EVAL 47 a little bit of difficulty              Current:        PLAN  [x]  Upgrade activities as tolerated     [x]  Continue plan of care  []  Update interventions per flow sheet       []  Discharge due to:_  []  Other:_      Jammie Lord, PT 5/3/2019  2:06 PM    Future Appointments   Date Time Provider Duane Dickersonisti   5/10/2019  2:00 PM Briana Hi PTA [facility]   5/14/2019 10:40 AM Shukri Farrell MD [place] [id number]   5/20/2019  9:30 AM Karena Coreas MD [facility] 10.

## 2019-05-14 ENCOUNTER — OFFICE VISIT (OUTPATIENT)
Dept: ORTHOPEDIC SURGERY | Age: 29
End: 2019-05-14

## 2019-05-14 VITALS
TEMPERATURE: 96.2 F | BODY MASS INDEX: 34.59 KG/M2 | DIASTOLIC BLOOD PRESSURE: 83 MMHG | SYSTOLIC BLOOD PRESSURE: 133 MMHG | HEART RATE: 76 BPM | OXYGEN SATURATION: 100 % | WEIGHT: 255.4 LBS | HEIGHT: 72 IN | RESPIRATION RATE: 11 BRPM

## 2019-05-14 DIAGNOSIS — S83.282D ACUTE LATERAL MENISCUS TEAR OF LEFT KNEE, SUBSEQUENT ENCOUNTER: Primary | ICD-10-CM

## 2019-05-14 DIAGNOSIS — Z98.890 HISTORY OF REPAIR OF ANTERIOR CRUCIATE LIGAMENT OF LEFT KNEE: ICD-10-CM

## 2019-05-14 DIAGNOSIS — S83.242D ACUTE MEDIAL MENISCUS TEAR, LEFT, SUBSEQUENT ENCOUNTER: ICD-10-CM

## 2019-05-14 DIAGNOSIS — M24.662 ARTHROFIBROSIS OF KNEE JOINT, LEFT: ICD-10-CM

## 2019-05-14 NOTE — PROGRESS NOTES
1. Have you been to the ER, urgent care clinic since your last visit? Hospitalized since your last visit? Yes When: WEEK AGO Where: URGENT CARE Reason for visit: SPASM IN NECK 2. Have you seen or consulted any other health care providers outside of the [address] [place] [address] since your last visit? Include any pap smears or colon screening.  No

## 2019-05-14 NOTE — PROGRESS NOTES
Xavier Reyes 1990 Chief Complaint Patient presents with  Knee Pain LEFT KNEE PAIN  
  
 
HISTORY OF PRESENT ILLNESS Xavier Reyes is a 34 y.o. male who presents today for reevaluation of left knee pain. Patient rates pain as 6/10 today. Pain has been present for awhile. He states he has recurrent swelling. He states the knee is tight and feeling like its going to pop. He had surgery in July 2017 for ACL reconstruction. He recently got medial insurance back. He works at [employer]. He has been attending PT which has not helped much. He is requesting a cane today. He states he is not mentally ready for surgery at this time. Patient denies any fever, chills, chest pain, shortness of breath or calf pain. The remainder of the review of systems is negative. There are no new illness or injuries to report since last seen in the office. There are no changes to medications, allergies, family or social history. Pain Assessment  5/14/2019 Location of Pain Knee Location Modifiers Left Severity of Pain 6 Quality of Pain Aching Duration of Pain Persistent Frequency of Pain Constant Aggravating Factors Walking;Standing Limiting Behavior -  
Relieving Factors Rest;Elevation Result of Injury No  
Work-Related Injury - Type of Injury -  
 
PHYSICAL EXAM:  
Visit Vitals /83 Pulse 76 Temp 96.2 °F (35.7 °C) (Oral) Resp 11 Ht 6' (1.829 m) Wt 255 lb 6.4 oz (115.8 kg) SpO2 100% BMI 34.64 kg/m² The patient is a well-developed, well-nourished male   in no acute distress. The patient is alert and oriented times three. The patient is alert and oriented times three. Mood and affect are normal. 
LYMPHATIC: lymph nodes are not enlarged and are within normal limits SKIN: normal in color and non tender to palpation. There are no bruises or abrasions noted. NEUROLOGICAL: Motor sensory exam is within normal limits.  Reflexes are equal bilaterally. There is normal sensation to pinprick and light touch MUSCULOSKELETAL: 
Examination Left knee Skin Intact Range of motion  Effusion + Medial joint line tenderness + Lateral joint line tenderness - Tenderness Pes Bursa - Tenderness insertion MCL - Tenderness insertion LCL - Bens -  
Patella crepitus + Patella grind + Lachman - Pivot shift - Anterior drawer - Posterior drawer -  
Varus stress -  
Valgus stress - Neurovascular Intact Calf Swelling and Tenderness to Palpation -  
Radha's Test -  
Hamstring Cord Tightness - IMAGING: MRI of left knee dated 3/22/19 was reviewed and read: Impression:  
1. Surgical changes of prior ACL and MCL reconstruction with tendon grafts which appear intact. 2. 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 3. Medial and lateral meniscal tears as discussed. 4. Low-grade chondrosis in the lateral compartment. IMPRESSION:   
  ICD-10-CM ICD-9-CM 1. Acute lateral meniscus tear of left knee, subsequent encounter S83.282D V58.89 AMB SUPPLY ORDER  
  836.1 2. Acute medial meniscus tear, left, subsequent encounter S83.242D V58.89 AMB SUPPLY ORDER  
  836.0 3. History of repair of anterior cruciate ligament of left knee Z98.890 V45.89 AMB SUPPLY ORDER  
4. Arthrofibrosis of knee joint, left M24.662 718.56 AMB SUPPLY ORDER  
  
 
PLAN:  
1. The patient presents today with left knee pain due to meniscus tear and arthrofibrosis of the knee. We will order a cane. Discussed the need for surgery in the future. Risk factors include: n/a 2. No ultrasound exam indicated today 3. No cortisone injection indicated today 4. No Physical/Occupational Therapy indicated today 5. No diagnostic test indicated today:  
6. Yes durable medical equipment indicated today CANE 7. No referral indicated today 8. No medications indicated today: 9. No Narcotic indicated today RTC prn 
 
Scribed by Mahogany Andres ([address]) as dictated by MD TA Lorenz, Dr. Marv Bell, confirm that all documentation is accurate.  
 
Marv Bell M.D.  
[facility]

## 2019-05-22 ENCOUNTER — OFFICE VISIT (OUTPATIENT)
Dept: FAMILY MEDICINE CLINIC | Facility: CLINIC | Age: 29
End: 2019-05-22

## 2019-05-22 ENCOUNTER — TELEPHONE (OUTPATIENT)
Dept: FAMILY MEDICINE CLINIC | Facility: CLINIC | Age: 29
End: 2019-05-22

## 2019-05-22 VITALS
OXYGEN SATURATION: 96 % | HEIGHT: 72 IN | SYSTOLIC BLOOD PRESSURE: 132 MMHG | BODY MASS INDEX: 34.02 KG/M2 | HEART RATE: 97 BPM | RESPIRATION RATE: 16 BRPM | WEIGHT: 251.2 LBS | DIASTOLIC BLOOD PRESSURE: 90 MMHG | TEMPERATURE: 97 F

## 2019-05-22 DIAGNOSIS — M25.562 LEFT KNEE PAIN, UNSPECIFIED CHRONICITY: Primary | ICD-10-CM

## 2019-05-22 DIAGNOSIS — J30.9 ALLERGIC RHINITIS, UNSPECIFIED SEASONALITY, UNSPECIFIED TRIGGER: ICD-10-CM

## 2019-05-22 RX ORDER — MELOXICAM 7.5 MG/1
7.5 TABLET ORAL DAILY
Qty: 30 TAB | Refills: 6 | Status: SHIPPED | OUTPATIENT
Start: 2019-05-22 | End: 2019-06-27 | Stop reason: DRUGHIGH

## 2019-05-22 NOTE — PROGRESS NOTES
Karie Perrin is a 34 y.o. male here for f/u        Karie Perrni is a 34 y.o. male (: 1990) presenting to address:    Chief Complaint   Patient presents with    Knee Pain     here for f/u for L knee pain. no improvement       Vitals:    19 1338   BP: 132/90   Pulse: 97   Resp: 16   Temp: 97 °F (36.1 °C)   TempSrc: Oral   SpO2: 96%   Weight: 251 lb 3.2 oz (113.9 kg)   Height: 6' (1.829 m)   PainSc:   7   PainLoc: Knee       Hearing/Vision:   No exam data present    Learning Assessment:     Learning Assessment 2019   PRIMARY LEARNER Patient   HIGHEST LEVEL OF EDUCATION - PRIMARY LEARNER  GRADUATED HIGH SCHOOL OR GED   BARRIERS PRIMARY LEARNER NONE   CO-LEARNER CAREGIVER No   PRIMARY LANGUAGE ENGLISH   LEARNER PREFERENCE PRIMARY VIDEOS   ANSWERED BY patient   RELATIONSHIP SELF     Depression Screening:     3 most recent PHQ Screens 2019   Little interest or pleasure in doing things Not at all   Feeling down, depressed, irritable, or hopeless Not at all   Total Score PHQ 2 0     Fall Risk Assessment:   No flowsheet data found. Abuse Screening:   No flowsheet data found. Coordination of Care Questionaire:   1. Have you been to the ER, urgent care clinic since your last visit? Hospitalized since your last visit? [facility]     2. Have you seen or consulted any other health care providers outside of the [address] [place] [address] since your last visit? Include any pap smears or colon screening. NO    Advanced Directive:   1. Do you have an Advanced Directive? NO    2. Would you like information on Advanced Directives?  NO

## 2019-05-22 NOTE — PROGRESS NOTES
05/22/19  9:56 AM  had concerns including Knee Pain (room 8.  here for f/u for L knee pain. no improvement). HISTORY OF PRESENT ILLNESS   This is a 34 y.o. male who presents in follow-up for left knee pain. It was recommended that he have surgery, which he is financially not ready for. The knee is worse when he has to bend. It tends to hurt after that time for several hours. There is no swelling noted. .  No fevers admitted to. Current Outpatient Medications:     ibuprofen (MOTRIN IB) 200 mg tablet, Take  by mouth., Disp: , Rfl:     acetaminophen (TYLENOL PO), Take  by mouth., Disp: , Rfl:   No Known Allergies    Active Ambulatory Problems     Diagnosis Date Noted    No Active Ambulatory Problems     Resolved Ambulatory Problems     Diagnosis Date Noted    No Resolved Ambulatory Problems     No Additional Past Medical History     Review of Systems   Constitutional: Negative for chills, fever and malaise/fatigue. HENT: Positive for congestion. Negative for sinus pain. Respiratory: Negative for cough, hemoptysis, sputum production, shortness of breath and wheezing. Gastrointestinal: Negative for heartburn. Genitourinary: Negative for dysuria. Musculoskeletal: Positive for joint pain (This this is present in the left knee). Neurological: Negative for dizziness and headaches. Psychiatric/Behavioral: The patient is nervous/anxious. No results found for this or any previous visit. Visit Vitals  /90 (BP 1 Location: Right arm, BP Patient Position: Sitting)   Pulse 97   Temp 97 °F (36.1 °C) (Oral)   Resp 16   Ht 6' (1.829 m)   Wt 251 lb 3.2 oz (113.9 kg)   SpO2 96%   BMI 34.07 kg/m²     Physical Exam   Constitutional: He is oriented to person, place, and time. He appears well-nourished. HENT:   Head: Normocephalic. Right Ear: External ear normal.   Eyes: Pupils are equal, round, and reactive to light. EOM are normal.   Neck: Normal range of motion. Neck supple.    Cardiovascular: Normal rate, regular rhythm and normal heart sounds. Pulmonary/Chest: Effort normal and breath sounds normal. He has no wheezes. Abdominal: Soft. Bowel sounds are normal. There is no tenderness. Musculoskeletal: He exhibits tenderness. These scars are well-healed there is minimal crepitus to movement. Lateral pressure does cause some tenderness. Bending while standing also causes pressure primarily on the lateral side of the knee. Neurological: He is alert and oriented to person, place, and time. He exhibits abnormal muscle tone. Skin: Skin is warm and dry. No rash noted. MDM  Number of Diagnoses or Management Options  Allergic rhinitis, unspecified seasonality, unspecified trigger:   Left knee pain, unspecified chronicity:   Diagnosis management comments: The patient has left knee pain most likely secondary to postsurgical injury and degeneration. Have added, in addition to the nonsteroidal anti-capsaicin cream.  Per patient request we will also order a cane to help with movement and ambulation. Patient has some nasal congestion we will add Flonase nasal spray    ASSESSMENT and PLAN    ICD-10-CM ICD-9-CM    1. Left knee pain, unspecified chronicity M25.562 719.46 meloxicam (MOBIC) 7.5 mg tablet      capsaicin (ZOSTRIX) 0.033 % crea      AMB SUPPLY ORDER    Not controlled by Motrin  Ordered Mobic and zostrix cream   2. Allergic rhinitis, unspecified seasonality, unspecified trigger J30.9 477.9 fluticasone propionate (FLONASE) 50 mcg/actuation nasal spray     Follow-up and Dispositions    · Return in about 3 months (around 8/22/2019).

## 2019-05-22 NOTE — PATIENT INSTRUCTIONS
Call if pain not improving in one week Knee Pain or Injury: Care Instructions Your Care Instructions Injuries are a common cause of knee problems. Sudden (acute) injuries may be caused by a direct blow to the knee. They can also be caused by abnormal twisting, bending, or falling on the knee. Pain, bruising, or swelling may be severe, and may start within minutes of the injury. Overuse is another cause of knee pain. Other causes are climbing stairs, kneeling, and other activities that use the knee. Everyday wear and tear, especially as you get older, also can cause knee pain. Rest, along with home treatment, often relieves pain and allows your knee to heal. If you have a serious knee injury, you may need tests and treatment. Follow-up care is a key part of your treatment and safety. Be sure to make and go to all appointments, and call your doctor if you are having problems. It's also a good idea to know your test results and keep a list of the medicines you take. How can you care for yourself at home? · Be safe with medicines. Read and follow all instructions on the label. ? If the doctor gave you a prescription medicine for pain, take it as prescribed. ? If you are not taking a prescription pain medicine, ask your doctor if you can take an over-the-counter medicine. · Rest and protect your knee. Take a break from any activity that may cause pain. · Put ice or a cold pack on your knee for 10 to 20 minutes at a time. Put a thin cloth between the ice and your skin. · Prop up a sore knee on a pillow when you ice it or anytime you sit or lie down for the next 3 days. Try to keep it above the level of your heart. This will help reduce swelling. · If your knee is not swollen, you can put moist heat, a heating pad, or a warm cloth on your knee. · If your doctor recommends an elastic bandage, sleeve, or other type of support for your knee, wear it as directed. · Follow your doctor's instructions about how much weight you can put on your leg. Use a cane, crutches, or a walker as instructed. · Follow your doctor's instructions about activity during your healing process. If you can do mild exercise, slowly increase your activity. · Reach and stay at a healthy weight. Extra weight can strain the joints, especially the knees and hips, and make the pain worse. Losing even a few pounds may help. When should you call for help? Call 911 anytime you think you may need emergency care. For example, call if: 
  · You have symptoms of a blood clot in your lung (called a pulmonary embolism). These may include: 
? Sudden chest pain. ? Trouble breathing. ? Coughing up blood.  
 Call your doctor now or seek immediate medical care if: 
  · You have severe or increasing pain.  
  · Your leg or foot turns cold or changes color.  
  · You cannot stand or put weight on your knee.  
  · Your knee looks twisted or bent out of shape.  
  · You cannot move your knee.  
  · You have signs of infection, such as: 
? Increased pain, swelling, warmth, or redness. ? Red streaks leading from the knee. ? Pus draining from a place on your knee. ? A fever.  
  · You have signs of a blood clot in your leg (called a deep vein thrombosis), such as: 
? Pain in your calf, back of the knee, thigh, or groin. ? Redness and swelling in your leg or groin.  
 Watch closely for changes in your health, and be sure to contact your doctor if: 
  · You have tingling, weakness, or numbness in your knee.  
  · You have any new symptoms, such as swelling.  
  · You have bruises from a knee injury that last longer than 2 weeks.  
  · You do not get better as expected. Where can you learn more? Go to http://bozena-quincy.info/. Enter K195 in the search box to learn more about \"Knee Pain or Injury: Care Instructions. \" Current as of: September 23, 2018 Content Version: 11.9 © 5274-7543 [employer]. Care instructions adapted under license by [employer] (which disclaims liability or warranty for this information). If you have questions about a medical condition or this instruction, always ask your healthcare professional. [address] any warranty or liability for your use of this information.

## 2019-05-23 ENCOUNTER — TELEPHONE (OUTPATIENT)
Dept: FAMILY MEDICINE CLINIC | Facility: CLINIC | Age: 29
End: 2019-05-23

## 2019-05-23 ENCOUNTER — HOSPITAL ENCOUNTER (OUTPATIENT)
Dept: PHYSICAL THERAPY | Age: 29
Discharge: HOME | End: 2019-05-23
Payer: COMMERCIAL

## 2019-05-23 PROCEDURE — 97140 MANUAL THERAPY 1/> REGIONS: CPT

## 2019-05-23 PROCEDURE — 97110 THERAPEUTIC EXERCISES: CPT

## 2019-05-23 PROCEDURE — 97016 VASOPNEUMATIC DEVICE THERAPY: CPT

## 2019-05-23 RX ORDER — FLUTICASONE PROPIONATE 50 MCG
SPRAY, SUSPENSION (ML) NASAL
Qty: 1 BOTTLE | Refills: 6 | Status: SHIPPED | OUTPATIENT
Start: 2019-05-23 | End: 2020-10-16

## 2019-05-23 NOTE — TELEPHONE ENCOUNTER
Patient states he has a sinus infection and he forgot mention it to Dr. Yovani Brown when he saw him on Wednesday. He would lie antibiotic. Please advise.

## 2019-05-23 NOTE — PROGRESS NOTES
PT DAILY TREATMENT NOTE 10-18    Patient Name: Alphonse Coleman  Date:2019  : 1990  [x]  Patient  Verified  Payor: [address] [place] [address] Road / Plan: [address] / Product Type: Managed Care Medicaid /    In time:2:30  Out time:3:13  Total Treatment Time (min): 43  Visit #: 5 of 10      Treatment Area: Pain in left knee [M25.562]    SUBJECTIVE  Pain Level (0-10 scale): 7  Any medication changes, allergies to medications, adverse drug reactions, diagnosis change, or new procedure performed?: [x] No    [] Yes (see summary sheet for update)  Subjective functional status/changes:   [] No changes reported  Pt reports knee was hurting too bad to come to PT last week    OBJECTIVE    Modality rationale: decrease inflammation and decrease pain to improve the patients ability to perform daily tasks   Min Type Additional Details    [] Estim:  []Unatt       []IFC  []Premod                        []Other:  []w/ice   []w/heat  Position:  Location:    [] Estim: []Att    []TENS instruct  []NMES                    []Other:  []w/US   []w/ice   []w/heat  Position:  Location:    []  Traction: [] Cervical       []Lumbar                       [] Prone          []Supine                       []Intermittent   []Continuous Lbs:  [] before manual  [] after manual    []  Ultrasound: []Continuous   [] Pulsed                           []1MHz   []3MHz W/cm2:  Location:    []  Iontophoresis with dexamethasone         Location: [] Take home patch   [] In clinic    []  Ice     []  heat  []  Ice massage  []  Laser   []  Anodyne Position:  Location:    []  Laser with stim  []  Other:  Position:  Location:   10 [x]  Vasopneumatic Device Pressure:       [x] lo [] med [] hi   Temperature: [x] lo [] med [] hi   [] Skin assessment post-treatment:  []intact []redness- no adverse reaction    []redness - adverse reaction:       25 min Therapeutic Exercise:  [x] See flow sheet :   Rationale: increase ROM and increase strength to improve the patients ability to perform ADLs         8 min Manual Therapy:  Patella mobs, TPR to pes anserine, left knee jt mobs   Rationale: decrease pain, increase ROM and increase tissue extensibility to improve functional mobility            With   [] TE   [] TA   [] neuro   [] other: Patient Education: [x] Review HEP    [] Progressed/Changed HEP based on:   [] positioning   [] body mechanics   [] transfers   [] heat/ice application    [] other:      Other Objective/Functional Measures:   Some therex not performed due to pain     Pain Level (0-10 scale) post treatment: 5    ASSESSMENT/Changes in Function: Pt is able to perform SLR with full TKE but pain and quad fatigues easily. Pt states MD suggested surgery but pt is not interested at this time. Reviewed quad strengthening exercises for HEP. Pt states he feels like his left knee is going to give out on him when he goes up stairs and when he walks a long distance. Patient will continue to benefit from skilled PT services to modify and progress therapeutic interventions, address functional mobility deficits, address ROM deficits, address strength deficits, analyze and address soft tissue restrictions, analyze and cue movement patterns and analyze and modify body mechanics/ergonomics to attain remaining goals.      []  See Plan of Care  []  See progress note/recertification  []  See Discharge Summary         Progress towards goals / Updated goals:  Short Term Goals: To be accomplished in 5 treatments:               1 patient will have established and be I with HEP to aid with progression of skilled PT program               EVAL issued               BNBFVXB: Met- pt reports compliance 4/19/19, 4/26/19               2 patient will have pain 3/10 to aid with increase tolerance to work demands               EVAL 5               CIRILOFUGSZ 5/10 4/26/19  Long Term Goals: To be accomplished in 10 treatments:               1 patient will have pain 1-2/10 to aid with increase tolerance to work demands               EVAL 5               IXYEVOJ               2 patient will have MMT Left knee F/E to aid with increase stability for walking and standing               EVAL instability reports and MMT left knee F/E 4/4               CURRENT               3 patient will have reports of 50% improvement for carryover to ascending/descending 1 flight of stairs               EVAL quite a bit of diffiiculty               PWDVDAE               4 patient will have FOTO 68 to show improved tolerance to regular work and household activities               EVAL 47 a little bit of difficulty              Current:        PLAN  []  Upgrade activities as tolerated     []  Continue plan of care  []  Update interventions per flow sheet       []  Discharge due to:_  []  Other:_      Eleonora Carvajal, NEDA 5/23/2019  2:30 PM    Future Appointments   Date Time Provider [facility]   8/13/2019 10:50 AM Susy Mccarthy MD [id number] [facility]   8/21/2019  1:00 PM Tyler Mays MD [facility]

## 2019-05-31 ENCOUNTER — HOSPITAL ENCOUNTER (OUTPATIENT)
Dept: PHYSICAL THERAPY | Age: 29
Discharge: HOME | End: 2019-05-31
Payer: COMMERCIAL

## 2019-05-31 PROCEDURE — 97140 MANUAL THERAPY 1/> REGIONS: CPT

## 2019-05-31 PROCEDURE — 97110 THERAPEUTIC EXERCISES: CPT

## 2019-05-31 NOTE — PROGRESS NOTES
PT DAILY TREATMENT NOTE 10-18    Patient Name: Quan Bingham  Date:2019  : 1990  [x]  Patient  Verified  Payor: [address] [place] [address] Road / Plan: [address] / Product Type: Managed Care Medicaid /    In time 3:03  Out time:3:53  Total Treatment Time (min): 50  Visit #: 6 of 10    Medicare/BCBS Only   Total Timed Codes (min):   1:1 Treatment Time:         Treatment Area: Pain in left knee [M25.562]    SUBJECTIVE  Pain Level (0-10 scale): 7  Any medication changes, allergies to medications, adverse drug reactions, diagnosis change, or new procedure performed?: [x] No    [] Yes (see summary sheet for update)  Subjective functional status/changes:   [] No changes reported  Still  Having  This  Pain.     OBJECTIVE    Modality rationale: decrease edema, decrease inflammation, decrease pain and increase tissue extensibility to improve the patients ability to perform ADL   Min Type Additional Details    [] Estim:  []Unatt       []IFC  []Premod                        []Other:  []w/ice   []w/heat  Position:  Location:    [] Estim: []Att    []TENS instruct  []NMES                    []Other:  []w/US   []w/ice   []w/heat  Position:  Location:    []  Traction: [] Cervical       []Lumbar                       [] Prone          []Supine                       []Intermittent   []Continuous Lbs:  [] before manual  [] after manual    []  Ultrasound: []Continuous   [] Pulsed                           []1MHz   []3MHz W/cm2:  Location:    []  Iontophoresis with dexamethasone         Location: [] Take home patch   [] In clinic   10 [x]  Ice   post  []  heat  []  Ice massage  []  Laser   []  Anodyne Position:long  sit  Location:left  knee    []  Laser with stim  []  Other:  Position:  Location:    []  Vasopneumatic Device Pressure:       [] lo [] med [] hi   Temperature: [] lo [] med [] hi   [x] Skin assessment post-treatment:  [x]intact []redness- no adverse reaction    []redness - adverse reaction: min []Eval                  []Re-Eval       32 min Therapeutic Exercise:  [x] See flow sheet :   Rationale: increase ROM and increase strength to improve the patients ability to perform  ADL     min Therapeutic Activity:  []  See flow sheet :   Rationale:   to improve the patients ability to       min Neuromuscular Re-education:  []  See flow sheet :   Rationale:   to improve the patients ability to     8 min Manual Therapy:  Patella  Mob/passive  Stretch  Knee  Flex/ext   Rationale: decrease pain, increase ROM, increase tissue extensibility and decrease edema  to perform ADL      min Gait Training:  ___ feet with ___ device on level surfaces with ___ level of assist   Rationale: With   [x] TE   [] TA   [] neuro   [] other: Patient Education: [x] Review HEP    [] Progressed/Changed HEP based on:   [] positioning   [] body mechanics   [] transfers   [] heat/ice application    [] other:      Other Objective/Functional Measures:  Cramping  At  Left  ITband  With knee  Flex in supine    Pain Level (0-10 scale) post treatment: 5    ASSESSMENT/Changes in Function: Responded   Fairly  Well  To each there ex  With cues. Patient will continue to benefit from skilled PT services to address functional mobility deficits, address ROM deficits, address strength deficits, analyze and address soft tissue restrictions and analyze and cue movement patterns to attain remaining goals.      [x]  See Plan of Care  []  See progress note/recertification  []  See Discharge Summary         Progress towards goals / Updated goals:  Short Term Goals: To be accomplished in 5 treatments:               1 patient will have established and be I with HEP to aid with progression of skilled PT program               EVAL issued               UKNZCYT: Met- pt reports compliance 4/19/19, 4/26/19               2 patient will have pain 3/10 to aid with increase tolerance to work demands               EVAL 5               HXOLDJT 5/10 4/26/19  Long Term Goals: To be accomplished in 10 treatments:               1 patient will have pain 1-2/10 to aid with increase tolerance to work demands               EVAL 5               CURRENT               2 patient will have MMT Left knee F/E to aid with increase stability for walking and standing               EVAL instability reports and MMT left knee F/E 4/4               CURRENT               3 patient will have reports of 50% improvement for carryover to ascending/descending 1 flight of stairs               EVAL quite a bit of diffiiculty               LLBPIEF               4 patient will have FOTO 68 to show improved tolerance to regular work and household activities               EVAL 47 a little bit of difficulty              Current:        PLAN  []  Upgrade activities as tolerated     [x]  Continue plan of care  []  Update interventions per flow sheet       []  Discharge due to:_  []  Other:_      Karina Neal, NEDA 5/31/2019  3:19 PM    Future Appointments   Date Time Provider Duane Jacobs   8/13/2019 10:50 AM Katia Pepper MD [address]   8/21/2019  1:00 PM Ok Live MD [facility] 10.

## 2019-06-07 ENCOUNTER — HOSPITAL ENCOUNTER (OUTPATIENT)
Dept: PHYSICAL THERAPY | Age: 29
End: 2019-06-07
Payer: COMMERCIAL

## 2019-06-10 ENCOUNTER — DOCUMENTATION ONLY (OUTPATIENT)
Dept: FAMILY MEDICINE CLINIC | Facility: CLINIC | Age: 29
End: 2019-06-10

## 2019-06-10 NOTE — PROGRESS NOTES
Medication Zostrix 0.33% cream is not covered by pt insurance and they are requesting a alternative medication.

## 2019-06-11 ENCOUNTER — TELEPHONE (OUTPATIENT)
Dept: FAMILY MEDICINE CLINIC | Facility: CLINIC | Age: 29
End: 2019-06-11

## 2019-06-11 DIAGNOSIS — M25.562 LEFT KNEE PAIN, UNSPECIFIED CHRONICITY: Primary | ICD-10-CM

## 2019-06-11 RX ORDER — DICLOFENAC SODIUM 10 MG/G
GEL TOPICAL 4 TIMES DAILY
Qty: 4 G | Refills: 6 | Status: SHIPPED | OUTPATIENT
Start: 2019-06-11 | End: 2019-06-27 | Stop reason: DRUGHIGH

## 2019-06-11 NOTE — TELEPHONE ENCOUNTER
I called and spoke with pt and pt verified name and  Pt was made aware that the medication Zostrix 0.025% was not covered through his insurance. Pt states \" that he has a letter that tells him what is covered from his insurance and he will give our office a call back with the name of the medication.

## 2019-06-14 ENCOUNTER — HOSPITAL ENCOUNTER (OUTPATIENT)
Dept: PHYSICAL THERAPY | Age: 29
Discharge: HOME | End: 2019-06-14
Payer: COMMERCIAL

## 2019-06-14 PROCEDURE — 97035 APP MDLTY 1+ULTRASOUND EA 15: CPT

## 2019-06-14 PROCEDURE — 97110 THERAPEUTIC EXERCISES: CPT

## 2019-06-14 NOTE — PROGRESS NOTES
PT DAILY TREATMENT NOTE 10-18    Patient Name: Sharon Mejia  Date:2019  : 1990  [x]  Patient  Verified  Payor: [address] [place] [address] Road / Plan: [address] / Product Type: Managed Care Medicaid /    In time:2:08   Out time:2:47  Total Treatment Time (min): 32  Visit #: 7 of 10    Medicare/BCBS Only   Total Timed Codes (min):   1:1 Treatment Time:         Treatment Area: Pain in left knee [M25.562]    SUBJECTIVE  Pain Level (0-10 scale): 6  Any medication changes, allergies to medications, adverse drug reactions, diagnosis change, or new procedure performed?: [x] No    [] Yes (see summary sheet for update)  Subjective functional status/changes:   [] No changes reported  \" I feel  As  I'm  Getting  Worst.    OBJECTIVE    Modality rationale: decrease edema, decrease inflammation, decrease pain and increase tissue extensibility to improve the patients ability to perform ADL    Min Type Additional Details    [] Estim:  []Unatt       []IFC  []Premod                        []Other:  []w/ice   []w/heat  Position:  Location:    [] Estim: []Att    []TENS instruct  []NMES                    []Other:  []w/US   []w/ice   []w/heat  Position:  Location:    []  Traction: [] Cervical       []Lumbar                       [] Prone          []Supine                       []Intermittent   []Continuous Lbs:  [] before manual  [] after manual   8 [x]  Ultrasound: [x]Continuous   [] Pulsed                           []1MHz   []3MHz W/cm2:1.3  Location:left  knee    []  Iontophoresis with dexamethasone         Location: [] Take home patch   [] In clinic    []  Ice     []  heat  []  Ice massage  []  Laser   []  Anodyne Position:  Location:    []  Laser with stim  []  Other:  Position:  Location:    []  Vasopneumatic Device Pressure:       [] lo [] med [] hi   Temperature: [] lo [] med [] hi   [x] Skin assessment post-treatment:  [x]intact []redness- no adverse reaction    []redness - adverse reaction: min []Eval                  []Re-Eval       24 min Therapeutic Exercise:  [x] See flow sheet :   Rationale: increase ROM and increase strength to improve the patients ability to perform ADL      min Therapeutic Activity:  [x]  See flow sheet :   Rationale:   to improve the patients ability to       min Neuromuscular Re-education:  []  See flow sheet :   Rationale:   to improve the patients ability to      min Manual Therapy:     Rationale: decrease pain, increase ROM, increase tissue extensibility and decrease edema  to perform ADL      min Gait Training:  ___ feet with ___ device on level surfaces with ___ level of assist   Rationale: With   [x] TE   [] TA   [] neuro   [] other: Patient Education: [x] Review HEP    [] Progressed/Changed HEP based on:   [] positioning   [] body mechanics   [] transfers   [] heat/ice application    [] other:      Other Objective/Functional Measures: Added  6\"  step    Pain Level (0-10 scale) post treatment: 6    ASSESSMENT/Changes in Function: c/o  Numbness at  Lateral  Aspect  Left  Knee. Continued  With residual pain at left  Knee. Discussed  with pt on scheduling  Earlier  Appt if  Pain increases. Patient will continue to benefit from skilled PT services to address functional mobility deficits, address ROM deficits, address strength deficits, analyze and address soft tissue restrictions, analyze and cue movement patterns and instruct in home and community integration to attain remaining goals.      [x]  See Plan of Care  []  See progress note/recertification  []  See Discharge Summary         Progress towards goals / Updated goals:  Short Term Goals: To be accomplished in 5 treatments:               1 patient will have established and be I with HEP to aid with progression of skilled PT program               EVAL issued               IRGAMDJ: Met- pt reports compliance 4/19/19, 4/26/19               2 patient will have pain 3/10 to aid with increase tolerance to work demands               EVAL 5               CHVOZUP 5/10 4/26/19  Long Term Goals: To be accomplished in 10 treatments:               1 patient will have pain 1-2/10 to aid with increase tolerance to work demands               EVAL 5               CURRENT               2 patient will have MMT Left knee F/E to aid with increase stability for walking and standing               EVAL instability reports and MMT left knee F/E 4/4               CURRENT               3 patient will have reports of 50% improvement for carryover to ascending/descending 1 flight of stairs               EVAL quite a bit of diffiiculty               BWDVOBK               4 patient will have FOTO 68 to show improved tolerance to regular work and household activities               EVAL 47 a little bit of difficulty              Current:        PLAN  []  Upgrade activities as tolerated     [x]  Continue plan of care  []  Update interventions per flow sheet       []  Discharge due to:_  [x]  Other:_   REASSESS MMT NV    Tiffanie Bishop, PTA 6/14/2019  2:21 PM    Future Appointments   Date Time Provider Duane Dickersonisti   8/13/2019 10:50 AM Katia Pepper MD [facility] [address]   8/21/2019  1:00 PM Ok Live MD [facility] 10.

## 2019-06-21 ENCOUNTER — HOSPITAL ENCOUNTER (OUTPATIENT)
Dept: PHYSICAL THERAPY | Age: 29
End: 2019-06-21
Payer: COMMERCIAL

## 2019-06-27 ENCOUNTER — OFFICE VISIT (OUTPATIENT)
Dept: FAMILY MEDICINE CLINIC | Facility: CLINIC | Age: 29
End: 2019-06-27

## 2019-06-27 VITALS
BODY MASS INDEX: 33.86 KG/M2 | OXYGEN SATURATION: 97 % | HEIGHT: 72 IN | DIASTOLIC BLOOD PRESSURE: 83 MMHG | SYSTOLIC BLOOD PRESSURE: 117 MMHG | RESPIRATION RATE: 20 BRPM | TEMPERATURE: 96.8 F | HEART RATE: 57 BPM | WEIGHT: 250 LBS

## 2019-06-27 DIAGNOSIS — M25.562 LEFT KNEE PAIN, UNSPECIFIED CHRONICITY: ICD-10-CM

## 2019-06-27 DIAGNOSIS — F41.8 ANXIETY WITH DEPRESSION: Primary | ICD-10-CM

## 2019-06-27 RX ORDER — MELOXICAM 15 MG/1
15 TABLET ORAL DAILY
Qty: 30 TAB | Refills: 6 | Status: SHIPPED | OUTPATIENT
Start: 2019-06-27 | End: 2020-03-27

## 2019-06-27 NOTE — PATIENT INSTRUCTIONS
Depression Treatment: Care Instructions Your Care Instructions Depression is a condition that affects the way you feel, think, and act. It causes symptoms such as low energy, loss of interest in daily activities, and sadness or grouchiness that goes on for a long time. Depression is very common and affects men and women of all ages. Depression is a medical illness caused by changes in the natural chemicals in your brain. It is not a character flaw, and it does not mean that you are a bad or weak person. It does not mean that you are going crazy. It is important to know that depression can be treated. Medicines, counseling, and self-care can all help. Many people do not get help because they are embarrassed or think that they will get over the depression on their own. But some people do not get better without treatment. Follow-up care is a key part of your treatment and safety. Be sure to make and go to all appointments, and call your doctor if you are having problems. It's also a good idea to know your test results and keep a list of the medicines you take. How can you care for yourself at home? Learn about antidepressant medicines Antidepressant medicines can improve or end the symptoms of depression. You may need to take the medicine for at least 6 months, and often longer. Keep taking your medicine even if you feel better. If you stop taking it too soon, your symptoms may come back or get worse. You may start to feel better within 1 to 3 weeks of taking antidepressant medicine. But it can take as many as 6 to 8 weeks to see more improvement. Talk to your doctor if you have problems with your medicine or if you do not notice any improvement after 3 weeks. Antidepressants can make you feel tired, dizzy, or nervous. Some people have dry mouth, constipation, headaches, sexual problems, an upset stomach, or diarrhea.  Many of these side effects are mild and go away on their own after you take the medicine for a few weeks. Some may last longer. Talk to your doctor if side effects bother you too much. You might be able to try a different medicine. If you are pregnant or breastfeeding, talk to your doctor about what medicines you can take. Learn about counseling In many cases, counseling can work as well as medicines to treat mild to moderate depression. Counseling is done by licensed mental health providers, such as psychologists, social workers, and some types of nurses. It can be done in one-on-one sessions or in a group setting. Many people find group sessions helpful. Cognitive-behavioral therapy is a type of counseling. In this treatment therapy, you learn how to see and change unhelpful thinking styles that may be adding to your depression. Counseling and medicines often work well when used together. To manage depression · Be physically active. Getting 30 minutes of exercise each day is good for your body and your mind. Begin slowly if it is hard for you to get started. If you already exercise, keep it up. · Plan something pleasant for yourself every day. Include activities that you have enjoyed in the past. 
· Get enough sleep. Talk to your doctor if you have problems sleeping. · Eat a balanced diet. If you do not feel hungry, eat small snacks rather than large meals. · Do not drink alcohol, use illegal drugs, or take medicines that your doctor has not prescribed for you. They may interfere with your treatment. · Spend time with family and friends. It may help to speak openly about your depression with people you trust. 
· Take your medicines exactly as prescribed. Call your doctor if you think you are having a problem with your medicine. · Do not make major life decisions while you are depressed. Depression may change the way you think. You will be able to make better decisions after you feel better. · Think positively. Challenge negative thoughts with statements such as \"I am hopeful\"; \"Things will get better\"; and \"I can ask for the help I need. \" Write down these statements and read them often, even if you don't believe them yet. · Be patient with yourself. It took time for your depression to develop, and it will take time for your symptoms to improve. Do not take on too much or be too hard on yourself. · Learn all you can about depression from written and online materials. · Check out behavioral health classes to learn more about dealing with depression. · Keep the numbers for these national suicide hotlines: 4-279-347-TALK (8-504.210.2249) and 8-011-LAXNYYC (7-205.129.3415). If you or someone you know talks about suicide or feeling hopeless, get help right away. When should you call for help? Call 911 anytime you think you may need emergency care. For example, call if: 
  · You feel you cannot stop from hurting yourself or someone else.  
[facility] your doctor now or seek immediate medical care if: 
  · You hear voices.  
  · You feel much more depressed.  
 Watch closely for changes in your health, and be sure to contact your doctor if: 
  · You are having problems with your depression medicine.  
  · You are not getting better as expected. Where can you learn more? Go to http://bozena-quincy.info/. Enter Q091 in the search box to learn more about \"Depression Treatment: Care Instructions. \" Current as of: September 11, 2018 Content Version: 11.9 © 5646-2116 [employer]. Care instructions adapted under license by [employer] (which disclaims liability or warranty for this information). If you have questions about a medical condition or this instruction, always ask your healthcare professional. [address] any warranty or liability for your use of this information.

## 2019-06-27 NOTE — LETTER
NOTIFICATION RETURN TO WORK / SCHOOL 
 
6/27/2019 9:38 AM 
 
Mr. Idania Diggs 
[address] [facility] [address] [address] To Whom It May Concern: 
 
Idania Diggs is currently under the care of [facility]. He will return to work/school but is unable to sit for prolonged periods of time because of the need to  
 
have his knee straightened and elevated after 20 minutes of sitting. For this reason at this time he can  
 
not perform an occupation requiring sitting If there are questions or concerns please have the patient contact our office. Sincerely, Stephen Pritchett MD

## 2019-06-29 NOTE — PROGRESS NOTES
06/29/19  9:28 AM  29 y.o.male  Chief Complaint   Patient presents with    Follow-up     left knee pain Room 8     The patient presents for follow-up for left knee pain. The patient states that the cream does not work, which is Voltaren Voltaren. He is taking the Mobic at 7.5 mg daily  On the last visit he was sent to Dr. Janis Llamas the orthopedic specialist with plans for surgery discussed. He is weighing his options at this time. He was sent to PT also. He is having some difficulty at work there is pain on walking. When he sits upright he feels that after 20 minutes or so he must lie flat because the action of flexion of the hip and any bending of the left knee increases his pain. He feels the need to lay flat with the left knee elevated. The patient must lie down most of the time at home with the leg elevated  He wishes to be referred to psychiatry because of depression over his medical situation. The primary encounter diagnosis was Anxiety with depression. A diagnosis of Left knee pain, unspecified chronicity was also pertinent to this visit. Review of Systems   Constitutional: Negative for chills, fever and malaise/fatigue. HENT: Negative for congestion and sinus pain. Respiratory: Negative for cough, hemoptysis, sputum production, shortness of breath and wheezing. Gastrointestinal: Negative for heartburn. Genitourinary: Negative for dysuria. Musculoskeletal: Positive for joint pain (This this is present in the left knee). Neurological: Negative for dizziness and headaches. Psychiatric/Behavioral: Positive for depression. Negative for hallucinations, substance abuse and suicidal ideas. The patient is nervous/anxious. The patient does not have insomnia. family history is not on file. reports that he has never smoked. He has never used smokeless tobacco. He reports that he drinks alcohol.        Vitals:    06/27/19 0902   BP: 117/83   Pulse: (!) 57   Resp: 20   Temp: 96.8 °F (36 °C)   TempSrc: Oral   SpO2: 97%   Weight: 250 lb (113.4 kg)   Height: 6' (1.829 m)       Physical Exam   Constitutional: He is oriented to person, place, and time and well-developed, well-nourished, and in no distress. Body mass index is 33.91 kg/m². Pulmonary/Chest: Effort normal and breath sounds normal.   Abdominal: Soft. Bowel sounds are normal.   Musculoskeletal:   Redness of the left knee on pressing the patella against the tibia and femur in the groove. There is good left medial and lateral stability however flexion-extension of the knee reveals pain bilaterally. No swelling is noted and no redness is appreciated. Left hip flexion reveals mild discomfort   Neurological: He is alert and oriented to person, place, and time. Skin: Skin is warm and dry. Nursing note and vitals reviewed. MRI Results (maximum last 3): Results from [place] encounter on 03/22/19   MRI KNEE LT WO CONT    Impression Impression:    1. Surgical changes of prior ACL and MCL reconstruction with tendon grafts which  appear intact. 2. 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 3. Medial and lateral meniscal tears as discussed. 4. Low-grade chondrosis in the lateral compartment. Nuclear Medicine Results (maximum last 3): No results found for this or any previous visit. US Results (maximum last 3): No results found for this or any previous visit. Diagnoses and all orders for this visit:    1. Anxiety with depression  Comments:  Referral to psychiatry. Patient declines medication at this time  Orders:  -     REFERRAL TO PSYCHIATRY    2. Left knee pain, unspecified chronicity  Comments: We will increase the Mobic to 15 mg daily  Oneil Bean is deciding on surgery. Will follow along with orthopedics  Orders:  -     meloxicam (MOBIC) 15 mg tablet; Take 1 Tab by mouth daily.  Indications: pain left knee

## 2019-07-05 ENCOUNTER — HOSPITAL ENCOUNTER (OUTPATIENT)
Dept: PHYSICAL THERAPY | Age: 29
Discharge: HOME | End: 2019-07-05
Payer: COMMERCIAL

## 2019-07-05 PROCEDURE — 97110 THERAPEUTIC EXERCISES: CPT

## 2019-07-05 PROCEDURE — 97530 THERAPEUTIC ACTIVITIES: CPT

## 2019-07-05 PROCEDURE — 97016 VASOPNEUMATIC DEVICE THERAPY: CPT

## 2019-07-05 NOTE — PROGRESS NOTES
PT DAILY TREATMENT NOTE 10-18    Patient Name: Amador Ospina  Date:2019  : 1990  [x]  Patient  Verified  Payor: [address] [place] [address] Road / Plan: [address] / Product Type: Managed Care Medicaid /    In time:258  Out time:355  Total Treatment Time (min): 55  Visit #: 8 of 10    Medicare/BCBS Only   Total Timed Codes (min):  - 1:1 Treatment Time:  -       Treatment Area: Pain in left knee [M25.562]    SUBJECTIVE  Pain Level (0-10 scale): 5  Any medication changes, allergies to medications, adverse drug reactions, diagnosis change, or new procedure performed?: [x] No    [] Yes (see summary sheet for update)  Subjective functional status/changes:   [] No changes reported  \"it's the same, no change. I don't want to have to get surgery again. \"    OBJECTIVE    Modality rationale: decrease edema, decrease inflammation, decrease pain and increase tissue extensibility to improve the patients ability to aid with increase tolerance to ADLS and activities   Min Type Additional Details    [] Estim:  []Unatt       []IFC  []Premod                        []Other:  []w/ice   []w/heat  Position:  Location:    [] Estim: []Att    []TENS instruct  []NMES                    []Other:  []w/US   []w/ice   []w/heat  Position:  Location:    []  Traction: [] Cervical       []Lumbar                       [] Prone          []Supine                       []Intermittent   []Continuous Lbs:  [] before manual  [] after manual    []  Ultrasound: []Continuous   [] Pulsed                           []1MHz   []3MHz W/cm2:  Location:    []  Iontophoresis with dexamethasone         Location: [] Take home patch   [] In clinic    []  Ice     []  heat  []  Ice massage  []  Laser   []  Anodyne Position:  Location:    []  Laser with stim  []  Other:  Position:  Location:   15 [x]  Vasopneumatic Device   Left knee Pressure:       [x] lo [] med [] hi   Temperature: [x] lo [] med [] hi   [] Skin assessment post-treatment: []intact []redness- no adverse reaction    []redness - adverse reaction:       min []Eval                  []Re-Eval       25 min Therapeutic Exercise:  [x] See flow sheet :   Rationale: increase ROM, increase strength and improve coordination to improve the patients ability to increase tolerance to ADLs and activities    15 min Therapeutic Activity:  [x]  See flow sheet :   Rationale: increase ROM, increase strength, improve coordination, improve balance and increase proprioception  to improve the patients ability to aid with increase tolerance to ADLS and activities      min Neuromuscular Re-education:  []  See flow sheet :   Rationale:   to improve the patients ability to      min Manual Therapy:     Rationale:  to      min Gait Training:  ___ feet with ___ device on level surfaces with ___ level of assist   Rationale: With   [x] TE   [x] TA   [] neuro   [] other: Patient Education: [x] Review HEP    [x] Progressed/Changed HEP based on:   [] positioning   [] body mechanics   [] transfers   [] heat/ice application    [x] other: POC, FOTO     Other Objective/Functional Measures: VC exercises and technique, FOTO 30     Pain Level (0-10 scale) post treatment: 2    ASSESSMENT/Changes in Function: tolerated well. Overall notes no change and 0% improved since evaluation. Note he is tolerating and progressing with his exercises. Note also decline in FOTO    Patient will continue to benefit from skilled PT services to modify and progress therapeutic interventions, address ROM deficits, address strength deficits, analyze and address soft tissue restrictions, analyze and cue movement patterns, analyze and modify body mechanics/ergonomics, assess and modify postural abnormalities and instruct in home and community integration to attain remaining goals.      [x]  See Plan of Care  [x]  See progress note/recertification  []  See Discharge Summary         Progress towards goals / Updated goals:   Short Term Goals: To be accomplished in 5 treatments:               1 patient will have established and be I with HEP to aid with progression of skilled PT program               EVAL issued               LZDLHVX: 7/5/19               2 patient will have pain 3/10 to aid with increase tolerance to work demands               EVAL 5               CURRENT 5 7/5/19  Long Term Goals: To be accomplished in 10 treatments:               1 patient will have pain 1-2/10 to aid with increase tolerance to work demands               EVAL 5               CURRENT               2 patient will have MMT Left knee F/E to aid with increase stability for walking and standing               EVAL instability reports and MMT left knee F/E 4/4               CURRENT   5 7/5/19                 3 patient will have reports of 50% improvement for carryover to ascending/descending 1 flight of stairs               EVAL quite a bit of diffiiculty               YRTRGYX 0% 7/5/19               4 patient will have FOTO 68 to show improved tolerance to regular work and household activities  [place] [address] a little bit of difficulty              Current:  30 7/5/19        PLAN  [x]  Upgrade activities as tolerated     [x]  Continue plan of care  []  Update interventions per flow sheet       []  Discharge due to:_  []  Other:_      Shaneka Grace, PT 7/5/2019  3:09 PM    Future Appointments   Date Time Provider \[facility]\""   8/13/2019 10:50 AM Guilherme Reyna MD [id number] [facility]   9/30/2019  1:00 PM Jalen Cespedes MD [facility] 10.

## 2019-07-05 NOTE — PROGRESS NOTES
[address]  [address] [place] [address], [address] [place] [address], [address]  [phone] fax    Physician Update  [x] Progress Note  [] Discharge Summary  Patient name: Alen Caro Start of Care: 19   Referral source: Marifer Underwood,* : 1990   Medical/Treatment Diagnosis: Pain in left knee [M25.562]  Payor: [address] [place], [place] [address] Road / Plan: [address] / Product Type: Managed Care Medicaid /  Onset Date:2017, P/O 2019              Prior Hospitalization: see medical history Provider#: [id number]   Medications: Verified on Patient Summary List    Comorbidities: none with exception left knee surgery   Prior Level of Function:  I all areas of ADLS and activities, no AD , working, household chores and community activities, [employer] activities               Visits from [facility]: 8    Missed Visits: 5    Status at Evaluation/Last Progress Note: last MD noted 19  Progress towards Irean Self note slow progress with decrease in FOTO to 30 and 0% patient  reported improvement. He notes pain remains at 5 today. Note however he reports compliance with his HEP and has been progressing with his exercises when he does attend his appointments. He has 2 remaining appointments on his original plan and he has an  MD follow up and we will plan at this point to continue. He demonstrates the potential to make functional gains within a reasonable time for carryover to increase tolerance to household and community activities.   Goals: to be achieved in 10 total treatments                     1 patient will have pain 1-2/10 to aid with increase tolerance to work demands               PN5               CURRENT                2 patient will have reports of 50% improvement for carryover to ascending/descending 1 flight of stairs               PN 0%               CURRENT                3 patient will have FOTO 68 to show improved tolerance to regular work and household activities              PN 30              CURRENT       ASSESSMENT/RECOMMENDATIONS:  [x]Continue therapy per initial plan/protocol at a frequency of  2-3 x per week for 10 total treatments    []Continue therapy with the following recommended changes:_____________________      _____________________________________________________________________  []Discontinue therapy progressing towards or have reached established goals  []Discontinue therapy due to lack of appreciable progress towards goals  []Discontinue therapy due to lack of attendance or compliance  []Await Physician's recommendations/decisions regarding therapy  []Other:________________________________________________________________    Thank you for this referral. Sylvain Neely, PT 7/5/2019 3:28 PM  NOTE TO PHYSICIAN:  PLEASE COMPLETE THE ORDERS BELOW AND   FAX TO [facility] Therapy: [phone]  If you are unable to process this request in 24 hours please contact our office: [phone]    ? I have read the above report and request that my patient continue as recommended. ? I have read the above report and request that my patient continue therapy with the following changes/special instructions:_____________________________________  ? I have read the above report and request that my patient be discharged from therapy.     [de-identified] Signature:____________Date:_________TIME:________    [facility], Date and Time must be completed for valid certification **

## 2019-07-16 ENCOUNTER — DOCUMENTATION ONLY (OUTPATIENT)
Dept: ORTHOPEDIC SURGERY | Age: 29
End: 2019-07-16

## 2019-07-19 ENCOUNTER — HOSPITAL ENCOUNTER (OUTPATIENT)
Dept: PHYSICAL THERAPY | Age: 29
Discharge: HOME | End: 2019-07-19
Payer: COMMERCIAL

## 2019-07-19 PROCEDURE — 97530 THERAPEUTIC ACTIVITIES: CPT

## 2019-07-19 PROCEDURE — 97110 THERAPEUTIC EXERCISES: CPT

## 2019-07-19 NOTE — PROGRESS NOTES
PT DAILY TREATMENT NOTE 10-18    Patient Name: Jairo Coreas. Date:2019  : 1990  [x]  Patient  Verified  Payor: [address] [place] [address] Road / Plan: [address] / Product Type: Managed Care Medicaid /    In time:238  Out time:307  Total Treatment Time (min): 29  Visit #: 9 of 10    Treatment Area: Pain in left knee [M25.562]    SUBJECTIVE  Pain Level (0-10 scale): 6  Any medication changes, allergies to medications, adverse drug reactions, diagnosis change, or new procedure performed?: [x] No    [] Yes (see summary sheet for update)  Subjective functional status/changes:   [] No changes reported  \"It don't feel any different. \"    OBJECTIVE    Modality rationale: patient declined   Min Type Additional Details    [] Estim:  []Unatt       []IFC  []Premod                        []Other:  []w/ice   []w/heat  Position:  Location:    [] Estim: []Att    []TENS instruct  []NMES                    []Other:  []w/US   []w/ice   []w/heat  Position:  Location:    []  Traction: [] Cervical       []Lumbar                       [] Prone          []Supine                       []Intermittent   []Continuous Lbs:  [] before manual  [] after manual    []  Ultrasound: []Continuous   [] Pulsed                           []1MHz   []3MHz W/cm2:  Location:    []  Iontophoresis with dexamethasone         Location: [] Take home patch   [] In clinic    []  Ice     []  heat  []  Ice massage  []  Laser   []  Anodyne Position:  Location:    []  Laser with stim  []  Other:  Position:  Location:    []  Vasopneumatic Device Pressure:       [] lo [] med [] hi   Temperature: [] lo [] med [] hi   [] Skin assessment post-treatment:  []intact []redness- no adverse reaction    []redness - adverse reaction:     10 min Therapeutic Exercise:  [x] See flow sheet :   Rationale: increase ROM and increase strength to improve the patients ability to perform ADLs    19 min Therapeutic Activity:  [x]  See flow sheet :   Rationale: increase ROM, increase strength, improve coordination, improve balance and increase proprioception  to improve the patients ability to improve mobility, stance stability, and gait         With   [x] TE   [x] TA   [] neuro   [] other: Patient Education: [x] Review HEP    [] Progressed/Changed HEP based on:   [x] positioning   [x] body mechanics   [] transfers   [] heat/ice application    [] other:      Other Objective/Functional Measures:      Pain Level (0-10 scale) post treatment: 6    ASSESSMENT/Changes in Function: Pt reports continuing to have anterior knee pain. Ambulates around clinic with an antalgic gait pattern. He reports not noticing any improvements with his pain. Pt to follow up with MD on 8/13. Patient will continue to benefit from skilled PT services to modify and progress therapeutic interventions, address functional mobility deficits, address ROM deficits, address strength deficits, analyze and address soft tissue restrictions, analyze and cue movement patterns, analyze and modify body mechanics/ergonomics, assess and modify postural abnormalities, address imbalance/dizziness and instruct in home and community integration to attain remaining goals.      [x]  See Plan of Care  []  See progress note/recertification  []  See Discharge Summary         Progress towards goals / Updated goals:               3 patient will have pain 1-2/10 to aid with increase tolerance to work demands               PN5               AFRELKF                2 patient will have reports of 50% improvement for carryover to ascending/descending 1 flight of stairs               PN 0%               [id number]                3 patient will have FOTO 68 to show improved tolerance to regular work and household activities              PN 30  [address] [facility]  []  Upgrade activities as tolerated     [x]  Continue plan of care  []  Update interventions per flow sheet       []  Discharge due to:_  []  Other:_      Haylee Stevens Brody Ball, [facility] 7/19/2019  3:07 PM    Future Appointments   Date Time Provider Duane Cannoni   8/13/2019 10:50 AM Luci Chatterjee MD [place] [address]   9/30/2019  1:00 PM [facility]

## 2019-08-14 ENCOUNTER — APPOINTMENT (OUTPATIENT)
Dept: PHYSICAL THERAPY | Age: 29
End: 2019-08-14

## 2019-08-26 ENCOUNTER — OFFICE VISIT (OUTPATIENT)
Dept: ORTHOPEDIC SURGERY | Facility: CLINIC | Age: 29
End: 2019-08-26

## 2019-08-26 VITALS
RESPIRATION RATE: 18 BRPM | HEIGHT: 72 IN | TEMPERATURE: 97 F | WEIGHT: 256.8 LBS | BODY MASS INDEX: 34.78 KG/M2 | DIASTOLIC BLOOD PRESSURE: 81 MMHG | OXYGEN SATURATION: 99 % | SYSTOLIC BLOOD PRESSURE: 121 MMHG | HEART RATE: 81 BPM

## 2019-08-26 DIAGNOSIS — G89.29 CHRONIC PAIN OF LEFT KNEE: ICD-10-CM

## 2019-08-26 DIAGNOSIS — Z98.890 S/P ACL RECONSTRUCTION: ICD-10-CM

## 2019-08-26 DIAGNOSIS — M25.562 CHRONIC PAIN OF LEFT KNEE: ICD-10-CM

## 2019-08-26 DIAGNOSIS — M17.12 PRIMARY OSTEOARTHRITIS OF LEFT KNEE: Primary | ICD-10-CM

## 2019-08-26 RX ORDER — BETAMETHASONE SODIUM PHOSPHATE AND BETAMETHASONE ACETATE 3; 3 MG/ML; MG/ML
6 INJECTION, SUSPENSION INTRA-ARTICULAR; INTRALESIONAL; INTRAMUSCULAR; SOFT TISSUE ONCE
Qty: 0.5 ML | Refills: 0
Start: 2019-08-26 | End: 2019-08-26

## 2019-08-26 NOTE — PROGRESS NOTES
Patient: Santo Rodriguez. MRN: [id number]       SSN: [id number]  YOB: 1990        AGE: 34 y.o. SEX: male    PCP: Hguo Daly MD  08/26/19    Chief Complaint   Patient presents with    Knee Pain     Left     HISTORY:  Santo Bailey is a 34 y.o. male who is seen for left knee pain. He has been experiencing knee pain for the past 2 years. He sustained a severe left knee injury in June 2017. He fell into a ditch while walking. He sustained an ACL tear, MCL tear, and a left femoral medial epicondylar avulsion fracture. He underwent left ACL repair in [place] a month after injury. He has felt constant pain since his surgery. He notes intermittent left knee swelling. He notes pain with standing, walking, and stair climbing. He experiences startup pain after sitting. He was seen by Dr. Romi Brown in May. He states that he did get along well Dr. Romi Brown so he now presents for a second opinion. 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 He is applying for social security disability benefits for his left knee. He moved to this area from [place] to be closer to his mother. He lives in [place] with his brother. He gained 30 pounds recently due to inactivity. He is not able to exercise much due to his knee pain. He is not diabetic or hypertensive. Mr. Sabas Javier weighs 256 lbs and is 6'0\" tall.        No results found for: HBA1C, HGBE8, YEX1NCNK, XBF3FNHC, ONW0AXPJ  Weight Metrics 8/26/2019 6/27/2019 5/22/2019 5/14/2019 4/2/2019 3/11/2019 2/20/2019   Weight 256 lb 12.8 oz 250 lb 251 lb 3.2 oz 255 lb 6.4 oz 252 lb 249 lb 9.6 oz 190 lb 9.6 oz   BMI 34.83 kg/m2 33.91 kg/m2 34.07 kg/m2 34.64 kg/m2 34.18 kg/m2 33.85 kg/m2 25.85 kg/m2       There is no problem list on file for this patient. REVIEW OF SYSTEMS: All Below are Negative except: See HPI   Constitutional: negative for fever, chills, and weight loss. Cardiovascular: negative for chest pain, claudication, leg swelling, SOB, ROMANO   Gastrointestinal: Negative for pain, N/V/C/D, Blood in stool or urine, dysuria, hematuria, incontinence, pelvic pain. Musculoskeletal: See HPI   Neurological: Negative for dizziness and weakness. Negative for headaches, Visual changes, confusion, seizures   Phychiatric/Behavioral: Negative for depression, memory loss, substance abuse. Extremities: Negative for hair changes, rash, or skin lesion changes. Hematologic: Negative for bleeding problems, bruising, pallor or swollen lymph nodes   Peripheral Vascular: No calf pain, no circulation deficits.     Social History     Socioeconomic History    Marital status: UNKNOWN     Spouse name: Not on file    Number of children: Not on file    Years of education: Not on file    Highest education level: Not on file   Occupational History    Not on file   Social Needs    Financial resource strain: Not on file    Food insecurity:     Worry: Not on file     Inability: Not on file    Transportation needs:     Medical: Not on file     Non-medical: Not on file   Tobacco Use    Smoking status: Never Smoker    Smokeless tobacco: Never Used   Substance and Sexual Activity    Alcohol use: Yes     Comment: social     Drug use: Never    Sexual activity: Yes     Partners: Male   Lifestyle    Physical activity:     Days per week: Not on file     Minutes per session: Not on file    Stress: Not on file   Relationships    Social connections:     Talks on phone: Not on file     Gets together: Not on file     Attends Voodoo service: Not on file     Active member of club or organization: Not on file     Attends meetings of clubs or organizations: Not on file     Relationship status: Not on file    Intimate partner violence:     Fear of current or ex partner: Not on file     Emotionally abused: Not on file     Physically abused: Not on file     Forced sexual activity: Not on file   Other Topics Concern    Not on file   Social History Narrative    Not on file      No Known Allergies   Current Outpatient Medications   Medication Sig    meloxicam (MOBIC) 15 mg tablet Take 1 Tab by mouth daily. Indications: pain left knee    fluticasone propionate (FLONASE) 50 mcg/actuation nasal spray One squirt each nostril Daily    capsaicin (ZOSTRIX) 0.033 % crea Apply to affected area twice a day  Indications: Pain associated with Arthritis    acetaminophen (TYLENOL PO) Take  by mouth. No current facility-administered medications for this visit. PHYSICAL EXAMINATION:  Visit Vitals  /81   Pulse 81   Temp 97 °F (36.1 °C) (Oral)   Resp 18   Ht 6' (1.829 m)   Wt 256 lb 12.8 oz (116.5 kg)   SpO2 99%   BMI 34.83 kg/m²    Appearance: Alert, well appearing and pleasant patient who is in no distress, oriented to person, place/time, and who follows commands. HEENT: Liliya Paci. hears well, does not require hearing aids. His sclera of the eyes are non-icteric. He is breathing normally and no respiratory accessory muscle use is noted. No JVD present and Neck ROM within normal limits. Psychiatric: Affect and mood are appropriate. Oriented x3  Heart[de-identified]  S1, S2 without murmer, regular rhythm  Lungs:  Breath sounds clear to auscultation  Cardiovascular/Peripheral Vascular: Normal pulses to each foot. Integumentary: No rashes,  wounds, or abrasions. Warm and normal color. No drainage.    Gait: slight limp  Sensory Exam: Intact/Normal Sensation    Lymphatic: No evidence of Lymphedema  Vascular:       Pulses: palpable  Varicosities none  Wounds/Abrasion: None Present  Neuro: Negative, no tremors  ORTHO EXAMINATION:  Examination Right knee Left knee   Skin Intact Intact, well healed ACL reconstruction scar   Range of motion 120-0 95-0   Effusion - -   Medial joint line tenderness - +   Lateral joint line tenderness - -   Popliteal tenderness - -   Osteophytes palpable - +   Bens - -   Patella crepitus - +   Anterior drawer - 1+   Lateral laxity - -   Medial laxity - -   Varus deformity - -   Valgus deformity - -   Pretibial edema - -   Calf tenderness - -     Using single point cane    TIME OUT:  Chart reviewed for the following:   Elmira Hou MD, have reviewed the History, Physical and updated the Allergic reactions for [address] performed immediately prior to start of procedure:  Elmira Hou MD, have performed the following reviews on [place]. prior to the start of the procedure:          * Patient was identified by name and date of birth   * Agreement on procedure being performed was verified  * Risks and Benefits explained to the patient  * Procedure site verified and marked as necessary  * Patient was positioned for comfort  * Consent was obtained     Time: 11:40 AM     Date of procedure: 8/26/2019  Procedure performed by:  Micaela Jean-Baptiste MD  Mr. Sabas Javier tolerated the procedure well with no complications. MRI LEFT KNEE 3/22/19 [facility]  Impression:  1. Surgical changes of prior ACL and MCL reconstruction with tendon grafts which  appear intact. 2. 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 3. Medial and lateral meniscal tears as discussed. 4. Low-grade chondrosis in the lateral compartment. RADIOGRAPHS:  XR LEFT KNEE 3/11/19 [facility]  IMPRESSION:  Three views - No fractures, no effusion, mild joint space narrowing, + osteophytes present. Kellgren Kory grade 1 screws present, tunnels c/w ACL reconstruction    IMPRESSION:      ICD-10-CM ICD-9-CM    1.  Primary osteoarthritis of left knee M17.12 715.16 REFERRAL TO PHYSICAL THERAPY      betamethasone (CELESTONE SOLUSPAN) 6 mg/mL injection      BETAMETHASONE ACETATE & SODIUM PHOSPHATE INJECTION 3 MG EA.      DRAIN/INJECT LARGE JOINT/BURSA      PROCEDURE AUTHORIZATION TO    2. Chronic pain of left knee M25.562 719.46 REFERRAL TO PHYSICAL THERAPY    G89.29 338.29 betamethasone (CELESTONE SOLUSPAN) 6 mg/mL injection      BETAMETHASONE ACETATE & SODIUM PHOSPHATE INJECTION 3 MG EA.      DRAIN/INJECT LARGE JOINT/BURSA      PROCEDURE AUTHORIZATION TO    3. S/P ACL reconstruction [id number] V45.89      PLAN:  After discussing treatment options, patient's left knee was injected with 4 cc Marcaine and 1/2 cc Celestone. Consider visco supplementation if pain continues. Full permanent left knee restrictions. There is no need for further surgery at this time. He will follow up as needed.       Scribed by Kiko Desir ([address]) as dictated by Vaibhav Ridley MD

## 2019-08-26 NOTE — LETTER
8/26/2019 11:37 AM 
 
Mr. Mcnair Both [address] [facility] [address] Full work restrictions for injuries to the Thigh, Knee, Leg, Ankle, and Foot PATIENT'S NAME: Dania Riley. DATE: 8/26/2019 LIFTING:   The patient can lift no more than 20 pounds. CLIMBING:   The patient can climb no ladders or stairs WALKING/STANDING The patient can walk or stand no more than 1 hour at a time. The patient cannot walk on uneven ground or on scaffolding. KNEELING/SQUATTING The patient cannot kneel or squat These restrictions are in effect for the above named patient From: 8/26/2019  TO: PERMANENT Sincerely, 
 
 
Stephen Pickett MD

## 2019-08-28 NOTE — PROGRESS NOTES
[facility] [address], [address] [place] [address], [address]  [phone] fax    Discharge Summary    Patient name: Satish Castaneda. Start of Care: 19  Referral source: Kwame Paredes,*    : 1990  Medical/Treatment Diagnosis: Pain in left knee [M25.562]  Payor: [address] [place] [address] Road / Plan: [address] / Product Type: Managed Care Medicaid /        Onset Date:2017, P/O 2019         Prior Hospitalization: see medical history   Provider#: [id number]  Comorbidities: none with exception left knee surgery   Prior Level of Function:  I all areas of ADLS and activities, no AD , working, household chores and community activities, [employer] activities             Medications: Verified on Patient Summary List    Visits from [facility] Care: 9    Missed Visits: 7  Reporting Period : 19 to 19      Summary of Care:please see latest MD note for specifics. He has exercises for his HEP and should follow up with MD as needed. He reported 0% overall improvement. Thank you.    Maria Teresa Mcclellan will have established and be I with HEP to aid with progression of skilled PT program  Status at last note/certification:eval  Status at discharge: met    Goal: patient will have pain 3/10 to aid with increase tolerance to work demands       Status at last note/certification:eval  Status at discharge: not met, 6    Goal:patient will have MMT Left knee F/E to aid with increase stability for walking and standing  Status at last note/certification:eval  Status at discharge: not met    Goal: patient will have reports of 50% improvement for carryover to ascending/descending 1 flight of stairs  Status at last note/certification:eval  Status at discharge: not met, 0 %      ASSESSMENT/RECOMMENDATIONS:  []Discontinue therapy progressing towards or have reached established goals  []Discontinue therapy due to lack of appreciable progress towards goals  [x]Discontinue therapy due to lack of attendance or compliance  [x]Other:no further contact    Thank you for this referral.     Sylvain Neely PT 8/28/2019 12:31 PM

## 2019-09-23 ENCOUNTER — OFFICE VISIT (OUTPATIENT)
Dept: ORTHOPEDIC SURGERY | Facility: CLINIC | Age: 29
End: 2019-09-23

## 2019-09-23 VITALS
DIASTOLIC BLOOD PRESSURE: 80 MMHG | WEIGHT: 261 LBS | OXYGEN SATURATION: 95 % | SYSTOLIC BLOOD PRESSURE: 127 MMHG | BODY MASS INDEX: 35.35 KG/M2 | TEMPERATURE: 96.4 F | HEIGHT: 72 IN | RESPIRATION RATE: 16 BRPM | HEART RATE: 85 BPM

## 2019-09-23 DIAGNOSIS — G89.29 CHRONIC PAIN OF LEFT KNEE: ICD-10-CM

## 2019-09-23 DIAGNOSIS — M25.562 CHRONIC PAIN OF LEFT KNEE: ICD-10-CM

## 2019-09-23 DIAGNOSIS — M17.12 PRIMARY OSTEOARTHRITIS OF LEFT KNEE: Primary | ICD-10-CM

## 2019-09-23 DIAGNOSIS — E66.01 SEVERE OBESITY (HCC): ICD-10-CM

## 2019-09-23 RX ORDER — SERTRALINE HYDROCHLORIDE 50 MG/1
TABLET, FILM COATED ORAL DAILY
COMMUNITY

## 2019-09-23 RX ORDER — HYALURONATE SODIUM 10 MG/ML
2 SYRINGE (ML) INTRAARTICULAR ONCE
Qty: 2 ML | Refills: 0
Start: 2019-09-23 | End: 2019-09-23

## 2019-09-23 RX ORDER — GABAPENTIN 400 MG/1
400 CAPSULE ORAL 3 TIMES DAILY
COMMUNITY
End: 2022-01-10

## 2019-09-23 NOTE — PROGRESS NOTES
1. Have you been to the ER, urgent care clinic since your last visit? Hospitalized since your last visit? No    2. Have you seen or consulted any other health care providers outside of the [facility] since your last visit? Include any pap smears or colon screening.  NO

## 2019-09-23 NOTE — PROGRESS NOTES
Patient: Wes Saavedra MRN: [id number]       SSN: [id number]  YOB: 1990        AGE: 34 y.o. SEX: male  Body mass index is 35.4 kg/m². PCP: Katherine Bonilla MD  09/23/19    Chief Complaint   Patient presents with    Knee Pain     left knee pain, f/u appt. HISTORY:  Wes Saavedra is a 34 y.o. male who is seen for left knee pain. ICD-10-CM ICD-9-CM    1. Primary osteoarthritis of left knee M17.12 715.16 REFERRAL TO PHYSICAL THERAPY      [place] DRAIN/INJECT LARGE JOINT/BURSA      EUFLEXXA INJECTION PER DOSE      sodium hyaluronate (SUPARTZ FX/HYALGAN/GENIVSC) 10 mg/mL syrg injection   2. Chronic pain of left knee M25.562 719.46 REFERRAL TO PHYSICAL THERAPY    G89.29 338.29 [place] DRAIN/INJECT LARGE JOINT/BURSA      EUFLEXXA INJECTION PER DOSE      sodium hyaluronate (SUPARTZ FX/HYALGAN/GENIVSC) 10 mg/mL syrg injection       Chart reviewed for the following:   Trent Ayala MD, have reviewed the History, Physical and updated the Allergic reactions for [address] [facility] performed immediately prior to start of procedure:  Trent Ayala MD, have performed the following reviews on Wes Sharpe. prior to the start of the procedure:            * Patient was identified by name and date of birth   * Agreement on procedure being performed was verified  * Risks and Benefits explained to the patient  * Procedure site verified and marked as necessary  * Patient was positioned for comfort  * Consent was obtained     Time: 11:32 AM     Date of procedure: 9/23/2019    Procedure performed by:  Johanny Obando MD    Mr. Mk Pena tolerated the procedure well with no complications. PLAN:  After discussing treatment options, patient's left knee was injected with 2 cc of Euflexxa. He will start a brief course of outpatient aquatic physical therapy. Mr. Mk Pena will follow up in one week for Euflexxa #2.     Scribed by Kina Laguna ([address]) as dictated by Adrian Zhou MD

## 2019-09-30 ENCOUNTER — OFFICE VISIT (OUTPATIENT)
Dept: FAMILY MEDICINE CLINIC | Facility: CLINIC | Age: 29
End: 2019-09-30

## 2019-09-30 VITALS
DIASTOLIC BLOOD PRESSURE: 83 MMHG | WEIGHT: 263 LBS | SYSTOLIC BLOOD PRESSURE: 137 MMHG | HEART RATE: 86 BPM | HEIGHT: 72 IN | RESPIRATION RATE: 20 BRPM | TEMPERATURE: 96.7 F | BODY MASS INDEX: 35.62 KG/M2 | OXYGEN SATURATION: 95 %

## 2019-09-30 DIAGNOSIS — F41.8 ANXIETY WITH DEPRESSION: ICD-10-CM

## 2019-09-30 DIAGNOSIS — M25.562 LEFT KNEE PAIN, UNSPECIFIED CHRONICITY: Primary | ICD-10-CM

## 2019-09-30 RX ORDER — LIDOCAINE 50 MG/G
PATCH TOPICAL
Qty: 1 EACH | Refills: 6 | Status: SHIPPED | OUTPATIENT
Start: 2019-09-30 | End: 2020-04-09 | Stop reason: SDUPTHER

## 2019-09-30 NOTE — PATIENT INSTRUCTIONS

## 2019-09-30 NOTE — PROGRESS NOTES
09/30/19  9:28 AM  29 y.o.male  No chief complaint on file. Knee pain, left  He now sees Dr. Cinthya Carney  The patient was last seen by orthopedics on 9/23/2019 the patient was injected with 2 cc of Euflexxa he is scheduled for 1 week follow-up. Depression  The patient is followed at [facility]. Gabapentin was given. He states that his mood has improved. No problem with the medication. The primary encounter diagnosis was Left knee pain, unspecified chronicity. A diagnosis of Anxiety with depression was also pertinent to this visit. Review of Systems   Constitutional: Negative for chills, fever and malaise/fatigue. HENT: Negative for congestion and sinus pain. Respiratory: Negative for cough, hemoptysis, sputum production, shortness of breath and wheezing. Gastrointestinal: Negative for heartburn. Genitourinary: Negative for dysuria. Musculoskeletal: Positive for joint pain (This this is present in the left knee). Neurological: Negative for dizziness and headaches. Psychiatric/Behavioral: Positive for depression. Negative for hallucinations, substance abuse and suicidal ideas. The patient is nervous/anxious. The patient does not have insomnia. family history is not on file. reports that he has never smoked. He has never used smokeless tobacco. He reports that he drinks alcohol. He reports that he does not use drugs. Vitals:    09/30/19 1309   BP: 137/83   Pulse: 86   Resp: 20   Temp: 96.7 °F (35.9 °C)   TempSrc: Oral   SpO2: 95%   Weight: 263 lb (119.3 kg)   Height: 6' (1.829 m)       Physical Exam   Constitutional: He is oriented to person, place, and time and well-developed, well-nourished, and in no distress. Body mass index is 35.67 kg/m². The weight is increasing     Eyes: Pupils are equal, round, and reactive to light. Neck: Normal range of motion. Neck supple. No thyromegaly present.    Cardiovascular: Normal rate, normal heart sounds and intact distal pulses. Pulmonary/Chest: Effort normal and breath sounds normal.   Abdominal: Soft. Bowel sounds are normal.   Musculoskeletal:   Redness of the left knee on pressing the patella against the tibia and femur in the groove. There is good left medial and lateral stability however flexion-extension of the knee reveals pain bilaterally. No swelling is noted and no redness is appreciated. Left hip flexion reveals mild discomfort   Lymphadenopathy:     He has no cervical adenopathy. Neurological: He is alert and oriented to person, place, and time. Skin: Skin is warm and dry. Nursing note and vitals reviewed. MRI Results (maximum last 3): Results from [place] encounter on 03/22/19   MRI KNEE LT WO CONT    Impression Impression:    1. Surgical changes of prior ACL and MCL reconstruction with tendon grafts which  appear intact. 2. 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 3. Medial and lateral meniscal tears as discussed. 4. Low-grade chondrosis in the lateral compartment. Diagnoses and all orders for this visit:    1. Left knee pain, unspecified chronicity  Comments:  Improving we will add a Lido pain patch if possible. Orders:  -     lidocaine (LIDODERM) 5 %; Apply patch to the affected area for 12 hours a day and remove for 12 hours a day. 2. Anxiety with depression  Comments:  Being followed by SUKUMAR BADILLO [facility] psych with good results.

## 2019-09-30 NOTE — PROGRESS NOTES
Chief Complaint   Patient presents with    Follow-up     Anxiety Depression left knee pain pt declined te flu shot Room 9

## 2019-10-03 ENCOUNTER — OFFICE VISIT (OUTPATIENT)
Dept: ORTHOPEDIC SURGERY | Facility: CLINIC | Age: 29
End: 2019-10-03

## 2019-10-03 VITALS
HEIGHT: 72 IN | HEART RATE: 88 BPM | TEMPERATURE: 97.4 F | RESPIRATION RATE: 18 BRPM | BODY MASS INDEX: 35.51 KG/M2 | WEIGHT: 262.2 LBS | OXYGEN SATURATION: 99 % | DIASTOLIC BLOOD PRESSURE: 90 MMHG | SYSTOLIC BLOOD PRESSURE: 125 MMHG

## 2019-10-03 DIAGNOSIS — M17.12 PRIMARY OSTEOARTHRITIS OF LEFT KNEE: Primary | ICD-10-CM

## 2019-10-03 DIAGNOSIS — M25.562 CHRONIC PAIN OF LEFT KNEE: ICD-10-CM

## 2019-10-03 DIAGNOSIS — G89.29 CHRONIC PAIN OF LEFT KNEE: ICD-10-CM

## 2019-10-03 RX ORDER — HYALURONATE SODIUM 10 MG/ML
2 SYRINGE (ML) INTRAARTICULAR ONCE
Qty: 2 ML | Refills: 0
Start: 2019-10-03 | End: 2019-10-03

## 2019-10-03 NOTE — PROGRESS NOTES
Patient: Sharyle Postin. MRN: [id number]       SSN: [id number]  YOB: 1990        AGE: 34 y.o. SEX: male  There is no height or weight on file to calculate BMI. PCP: Judy Evans MD  10/03/19    Chief Complaint   Patient presents with    Knee Pain     Left     HISTORY:  Sharyle Postin. is a 34 y.o. male who is seen for left knee pain. ICD-10-CM ICD-9-CM    1. Primary osteoarthritis of left knee M17.12 715.16 [place] DRAIN/INJECT LARGE JOINT/BURSA      EUFLEXXA INJECTION PER DOSE      sodium hyaluronate (SUPARTZ FX/HYALGAN/GENIVSC) 10 mg/mL syrg injection   2. Chronic pain of left knee M25.562 719.46 [place] DRAIN/INJECT LARGE JOINT/BURSA    G89.29 338.29 EUFLEXXA INJECTION PER DOSE      sodium hyaluronate (SUPARTZ FX/HYALGAN/GENIVSC) 10 mg/mL syrg injection       Chart reviewed for the following:   Yolanda Conte MD, have reviewed the History, Physical and updated the Allergic reactions for [address] [facility] performed immediately prior to start of procedure:  Yolanda Conte MD, have performed the following reviews on Sharyle Postin. prior to the start of the procedure:            * Patient was identified by name and date of birth   * Agreement on procedure being performed was verified  * Risks and Benefits explained to the patient  * Procedure site verified and marked as necessary  * Patient was positioned for comfort  * Consent was obtained     Time: 11:30 AM    Date of procedure: 10/3/2019    Procedure performed by:  Cordell Barlow MD    Mr. Maryellen Dickson tolerated the procedure well with no complications. PLAN:  After discussing treatment options, patient's left knee was injected with 2 cc of Euflexxa. Mr. Maryellen Dickson will follow up in one week to continue his visco supplementation injection series.       Scribed by Carmen Fitzpatrick ([address]) as dictated by Cordell Barlow MD

## 2019-10-10 ENCOUNTER — OFFICE VISIT (OUTPATIENT)
Dept: ORTHOPEDIC SURGERY | Facility: CLINIC | Age: 29
End: 2019-10-10

## 2019-10-10 VITALS
TEMPERATURE: 97 F | DIASTOLIC BLOOD PRESSURE: 97 MMHG | HEART RATE: 81 BPM | OXYGEN SATURATION: 100 % | HEIGHT: 72 IN | WEIGHT: 263.2 LBS | SYSTOLIC BLOOD PRESSURE: 138 MMHG | RESPIRATION RATE: 16 BRPM | BODY MASS INDEX: 35.65 KG/M2

## 2019-10-10 DIAGNOSIS — M17.12 PRIMARY OSTEOARTHRITIS OF LEFT KNEE: Primary | ICD-10-CM

## 2019-10-10 DIAGNOSIS — G89.29 CHRONIC PAIN OF LEFT KNEE: ICD-10-CM

## 2019-10-10 DIAGNOSIS — M25.562 CHRONIC PAIN OF LEFT KNEE: ICD-10-CM

## 2019-10-10 RX ORDER — HYALURONATE SODIUM 10 MG/ML
2 SYRINGE (ML) INTRAARTICULAR ONCE
Qty: 2 ML | Refills: 0
Start: 2019-10-10 | End: 2019-10-10

## 2019-10-10 NOTE — PROGRESS NOTES
Patient: Shanon Romero MRN: [id number]       SSN: [id number]  YOB: 1990        AGE: 34 y.o. SEX: male  Body mass index is 35.7 kg/m². PCP: Phu Ramey MD  10/10/19    Chief Complaint   Patient presents with    Knee Pain     Left     HISTORY:  Shanon Romero is a 34 y.o. male who is seen for left knee pain. TIME OUT performed immediately prior to start of procedure:  Jake Ramires MD, have performed the following reviews on Foye Flavors. prior to the start of the procedure:            * Patient was identified by name and date of birth   * Agreement on procedure being performed was verified  * Risks and Benefits explained to the patient  * Procedure site verified and marked as necessary  * Patient was positioned for comfort  * Consent was obtained     Time: 11:55 AM     Date of procedure: 10/10/2019    Procedure performed by:  Miesha Doll MD    Mr. Hercules Duverney tolerated the procedure well with no complications      SAG-06-YN ICD-9-CM    1. Primary osteoarthritis of left knee M17.12 715.16 [place] DRAIN/INJECT LARGE JOINT/BURSA      EUFLEXXA INJECTION PER DOSE      sodium hyaluronate (SUPARTZ FX/HYALGAN/GENIVSC) 10 mg/mL syrg injection   2. Chronic pain of left knee M25.562 719.46 [place] DRAIN/INJECT LARGE JOINT/BURSA    G89.29 338.29 EUFLEXXA INJECTION PER DOSE      sodium hyaluronate (SUPARTZ FX/HYALGAN/GENIVSC) 10 mg/mL syrg injection     PLAN:  After discussing treatment options, patient's left knee was injected with 2 cc of Euflexxa. Mr. Hercules Duverney will follow up PRN now that he has completed his visco supplementation injection series.       Scribed by Jaime Card ([address]) as dictated by Miesha Doll MD

## 2020-02-14 ENCOUNTER — DOCUMENTATION ONLY (OUTPATIENT)
Dept: ORTHOPEDIC SURGERY | Facility: CLINIC | Age: 30
End: 2020-02-14

## 2020-02-14 ENCOUNTER — TELEPHONE (OUTPATIENT)
Dept: ORTHOPEDIC SURGERY | Facility: CLINIC | Age: 30
End: 2020-02-14

## 2020-02-14 NOTE — PROGRESS NOTES
Pt dropped off dept of  form (1pg) at [facility] for completion.     Pt states he needs asap Monday 2/17/2020  Pt is aware of our 7 to 10 day policy    Please contact the patient at [phone]

## 2020-02-14 NOTE — LETTER
NOTIFICATION RETURN TO WORK / SCHOOL 
 
2/14/2020 4:38 PM 
 
Mr. Charlotte Quinn [address] [facility] [address] To Whom It May Concern: 
 
Heydi Mendieta. is currently under the care of [address] [place] [address]. Full work restrictions for injuries to the Thigh, Knee, Leg, Ankle, and Foot 
  
PATIENT'S NAME: Heydi Mendieta. DATE: 02/14/2020 
 
 
  
LIFTING:                                 The patient can lift no more than 20 pounds. 
  
CLIMBING:                              The patient can climb no ladders or stairs 
  
WALKING/STANDING           The patient can walk or stand no more than 1 hour at a time. The patient cannot walk on uneven ground or on scaffolding. 
  
KNEELING/SQUATTING       The patient cannot kneel or squat 
  
These restrictions are in effect for the above named patient From: 8/26/2019                     TO: Permanent Sincerely, 
 
 
 
 
 
Mary Taylor MD

## 2020-02-14 NOTE — TELEPHONE ENCOUNTER
Sony Garces with pt. Infromed pt he would need to see provider before forms could be complete. Transferred pt to Tori, PSR to sachin hernández. Anthony Majano

## 2020-02-14 NOTE — TELEPHONE ENCOUNTER
Patient requesting duplicate letter of 8/61/8828 to be updated with today's date ([id number]). ..     The patient can be reached at Z#[phone]

## 2020-02-19 ENCOUNTER — OFFICE VISIT (OUTPATIENT)
Dept: ORTHOPEDIC SURGERY | Facility: CLINIC | Age: 30
End: 2020-02-19

## 2020-02-19 VITALS
RESPIRATION RATE: 18 BRPM | DIASTOLIC BLOOD PRESSURE: 94 MMHG | WEIGHT: 262.6 LBS | TEMPERATURE: 96.9 F | OXYGEN SATURATION: 100 % | HEIGHT: 72 IN | BODY MASS INDEX: 35.57 KG/M2 | HEART RATE: 75 BPM | SYSTOLIC BLOOD PRESSURE: 142 MMHG

## 2020-02-19 DIAGNOSIS — M17.12 PRIMARY OSTEOARTHRITIS OF LEFT KNEE: Primary | ICD-10-CM

## 2020-02-19 DIAGNOSIS — E66.01 SEVERE OBESITY (HCC): ICD-10-CM

## 2020-02-19 DIAGNOSIS — G89.29 CHRONIC PAIN OF LEFT KNEE: ICD-10-CM

## 2020-02-19 DIAGNOSIS — Z98.890 S/P ACL RECONSTRUCTION: ICD-10-CM

## 2020-02-19 DIAGNOSIS — M24.662 ARTHROFIBROSIS OF KNEE JOINT, LEFT: ICD-10-CM

## 2020-02-19 DIAGNOSIS — M25.562 CHRONIC PAIN OF LEFT KNEE: ICD-10-CM

## 2020-02-19 RX ORDER — TRIAMCINOLONE ACETONIDE 40 MG/ML
40 INJECTION, SUSPENSION INTRA-ARTICULAR; INTRAMUSCULAR ONCE
Qty: 1 ML | Refills: 0
Start: 2020-02-19 | End: 2020-02-19

## 2020-02-19 NOTE — PROGRESS NOTES
Patient: Grayson Garza MRN: [id number]       SSN: [id number]  YOB: 1990        AGE: 31 y.o. SEX: male    PCP: Myra Her MD  02/19/20 02/19/20: Patients returns for clarification of limitation and paperwork completion. Pain left knee constant. Working Max 15 hours, he has difficulty with flexion secondary to joint \"tightness\"   He is s/p ACL repair. Chief Complaint   Patient presents with    Knee Pain     Left     HISTORY:  Grayson Garza is a 31 y.o. male who is seen for left knee pain. [facility] He sustained a severe left knee injury in June 2017. He fell into a ditch while walking. He sustained an ACL tear, MCL tear, and a left femoral medial epicondylar avulsion fracture. He underwent left ACL repair in [place] a month after injury. He has felt constant pain since his surgery. He notes intermittent left knee swelling. He notes pain with standing, walking, and stair climbing. He experiences startup pain after sitting. He was seen by Dr. Sandie Eden in May 2019. He states that he did get along well Dr. Sandie Eden. Pain Assessment  2/19/2020   Location of Pain Knee   Location Modifiers Left   Severity of Pain 7   Quality of Pain Aching   Duration of Pain Persistent   Frequency of Pain Constant   Aggravating Factors Walking;Standing;Bending   Aggravating Factors Comment -   Limiting Behavior Yes   Relieving Factors Elevation   Result of Injury Yes   Work-Related Injury No   Type of Injury Fall     Occupation, etc:  Mr. 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 He is applying for social security disability benefits for his left knee. He moved to this area from [place] to be closer to his mother. He lives in [place] with his brother. He gained 30 pounds recently due to inactivity. He is not able to exercise much due to his knee pain. He is not diabetic or hypertensive. Mr. Kamryn Evangelista weighs 256 lbs and is 6'0\" tall.        No results found for: HBA1C, Marlobethel Jaiden, WSB3TPES, UAN4NQIF  Weight Metrics 2/19/2020 10/10/2019 10/3/2019 9/30/2019 9/23/2019 8/26/2019 6/27/2019   Weight 262 lb 9.6 oz 263 lb 3.2 oz 262 lb 3.2 oz 263 lb 261 lb 256 lb 12.8 oz 250 lb   BMI 35.61 kg/m2 35.7 kg/m2 35.56 kg/m2 35.67 kg/m2 35.4 kg/m2 34.83 kg/m2 33.91 kg/m2       Patient Active Problem List   Diagnosis Code    [facility] ([facility] E66.01     REVIEW OF SYSTEMS: All Below are Negative except: See HPI   Constitutional: negative for fever, chills, and weight loss. Cardiovascular: negative for chest pain, claudication, leg swelling, SOB, ROMANO   Gastrointestinal: Negative for pain, N/V/C/D, Blood in stool or urine, dysuria, hematuria, incontinence, pelvic pain. Musculoskeletal: See HPI   Neurological: Negative for dizziness and weakness. Negative for headaches, Visual changes, confusion, seizures   Phychiatric/Behavioral: Negative for depression, memory loss, substance abuse. Extremities: Negative for hair changes, rash, or skin lesion changes. Hematologic: Negative for bleeding problems, bruising, pallor or swollen lymph nodes   Peripheral Vascular: No calf pain, no circulation deficits.     Social History     Socioeconomic History    Marital status: UNKNOWN     Spouse name: Not on file    Number of children: Not on file    Years of education: Not on file    Highest education level: Not on file   Occupational History    Not on file   Social Needs    Financial resource strain: Not on file    Food insecurity:     Worry: Not on file     Inability: Not on file    Transportation needs:     Medical: Not on file     Non-medical: Not on file   Tobacco Use    Smoking status: Never Smoker    Smokeless tobacco: Never Used   Substance and Sexual Activity    Alcohol use: Yes     Comment: social     Drug use: Never    Sexual activity: Yes     Partners: Male   Lifestyle    Physical activity:     Days per week: Not on file     Minutes per session: Not on file    Stress: Not on file   Relationships    Social connections:     Talks on phone: Not on file     Gets together: Not on file     Attends [employer] service: Not on file     Active member of club or organization: Not on file     Attends meetings of clubs or organizations: Not on file     Relationship status: Not on file    Intimate partner violence:     Fear of current or ex partner: Not on file     Emotionally abused: Not on file     Physically abused: Not on file     Forced sexual activity: Not on file   Other Topics Concern    Not on file   Social History Narrative    Not on file      No Known Allergies   Current Outpatient Medications   Medication Sig    triamcinolone acetonide (KENALOG) 40 mg/mL injection 1 mL by Intra artICUlar route once for 1 dose.  lidocaine (LIDODERM) 5 % Apply patch to the affected area for 12 hours a day and remove for 12 hours a day.  gabapentin (NEURONTIN) 400 mg capsule Take 400 mg by mouth three (3) times daily.  sertraline (ZOLOFT) 50 mg tablet Take  by mouth daily.  fluticasone propionate (FLONASE) 50 mcg/actuation nasal spray One squirt each nostril Daily    capsaicin (ZOSTRIX) 0.033 % crea Apply to affected area twice a day  Indications: Pain associated with Arthritis    meloxicam (MOBIC) 15 mg tablet Take 1 Tab by mouth daily. Indications: pain left knee    acetaminophen (TYLENOL PO) Take  by mouth. No current facility-administered medications for this visit.        PHYSICAL EXAMINATION:  Visit Vitals  BP (!) 142/94   Pulse 75   Temp 96.9 °F (36.1 °C) (Oral)   Resp 18   Ht 6' (1.829 m)   Wt 262 lb 9.6 oz (119.1 kg)   SpO2 100%   BMI 35.61 kg/m²    ORTHO EXAMINATION:  Examination Right knee Left knee   Skin Intact Intact, well healed ACL reconstruction scar curving from lateral distal thigh to proximal tibia   Range of motion 120-0 95-0   Effusion - -   Medial joint line tenderness - +   Lateral joint line tenderness - -   Popliteal tenderness - -   Osteophytes palpable - +   Bens - -   Patella crepitus - +   Anterior drawer - 1+   Lateral laxity - -   Medial laxity - -   Varus deformity - -   Valgus deformity - -   Pretibial edema - -   Calf tenderness - -     Using single point cane    TIME OUT:  Chart reviewed for the following:   Trupti CHAPPELL PA-C, have reviewed the History, Physical and updated the Allergic reactions for [address] performed immediately prior to start of procedure:  Trupti CHAPPELL PA-C, have performed the following reviews on Steffen Bloom. prior to the start of the procedure:          * Patient was identified by name and date of birth   * Agreement on procedure being performed was verified  * Risks and Benefits explained to the patient  * Procedure site verified and marked as necessary  * Patient was positioned for comfort  * Consent was obtained     Time: 230pm     Date of procedure: 2/19/2020  Procedure performed by:  Susie Shell PA-C  Mr. Suresh Nelson tolerated the procedure well with no complications. 1cc Kenalog at 40mg plus 7 ml 0.75 % Sensorcaine     Please Note:    The above patient was treated with the assistance of the [employer] Ultrasound device. Image(s) captured, saved, printed, and copied to chart. MRI LEFT KNEE 3/22/19 [facility]  Impression:  1. Surgical changes of prior ACL and MCL reconstruction with tendon grafts which  appear intact. 2. 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 3. Medial and lateral meniscal tears as discussed. 4. Low-grade chondrosis in the lateral compartment. RADIOGRAPHS:  XR LEFT KNEE 3/11/19 [facility]  IMPRESSION:  Three views - No fractures, no effusion, mild joint space narrowing, + osteophytes present. Kellgren Kory grade 1 screws present, tunnels c/w ACL reconstruction    IMPRESSION:      ICD-10-CM ICD-9-CM    1.  Primary osteoarthritis of left knee M17.12 715.16 US GUIDE INJ/ASP/ARTHRO LG JNT/BURSA TRIAMCINOLONE ACETONIDE INJ      triamcinolone acetonide (KENALOG) 40 mg/mL injection   2. Chronic pain of left knee M25.562 719.46 US GUIDE INJ/ASP/ARTHRO LG JNT/BURSA    G89.29 338.29 TRIAMCINOLONE ACETONIDE INJ      triamcinolone acetonide (KENALOG) 40 mg/mL injection   3. S/P ACL reconstruction Z98.890 V45.89 US GUIDE INJ/ASP/ARTHRO LG JNT/BURSA      TRIAMCINOLONE ACETONIDE INJ      triamcinolone acetonide (KENALOG) 40 mg/mL injection   4. Arthrofibrosis of knee joint, left M24.662 718.56 US GUIDE INJ/ASP/ARTHRO LG JNT/BURSA      TRIAMCINOLONE ACETONIDE INJ      triamcinolone acetonide (KENALOG) 40 mg/mL injection   5. Severe obesity ([facility]) E66.01 278.01 US GUIDE INJ/ASP/ARTHRO LG JNT/BURSA      TRIAMCINOLONE ACETONIDE INJ      triamcinolone acetonide (KENALOG) 40 mg/mL injection     PLAN:  After discussing treatment options, patient's left knee was injected kenalog / Marcaine. Full permanent left knee restrictions. There is no need for further surgery at this time. He will follow up as needed.

## 2020-02-19 NOTE — LETTER
NOTIFICATION RETURN TO WORK / SCHOOL 
 
2/19/2020 2:31 PM 
 
Mr. Bishop Paulino [address] [facility] [address] To Whom It May Concern: 
 
Linsey Cheng. is currently under the care of [address] [place] [address]. 
  
  
Full work restrictions for injuries to the Thigh, Knee, Leg, Ankle, and Foot 
  
 
 
PATIENT'S NAME: Josafat Rubio Jr.                                   DATE: 02/14/2020 
  
  
  
LIFTING:                                 The patient can lift no more than 20 pounds. 
  
CLIMBING:                              The patient can climb no ladders or stairs 
  
WALKING/STANDING           The patient can walk or stand no more than 1 hour at a time. The patient cannot walk on uneven ground or on scaffolding. 
  
KNEELING/SQUATTING       The patient cannot kneel or squat 
  
These restrictions are in effect for the above named patient From: 8/26/2019                     TO: Permanent Sincerely, 
 
 
 
 
 
 
Mary Grace Rodriguez PA-C

## 2020-03-27 ENCOUNTER — VIRTUAL VISIT (OUTPATIENT)
Dept: FAMILY MEDICINE CLINIC | Facility: CLINIC | Age: 30
End: 2020-03-27

## 2020-03-27 DIAGNOSIS — R03.0 ELEVATED BLOOD PRESSURE READING IN OFFICE WITHOUT DIAGNOSIS OF HYPERTENSION: Primary | ICD-10-CM

## 2020-03-27 RX ORDER — CALCIUM CARBONATE 750 MG/1
1 TABLET, CHEWABLE ORAL 2 TIMES DAILY
Qty: 1 KIT | Refills: 0 | Status: SHIPPED | OUTPATIENT
Start: 2020-03-27

## 2020-03-27 NOTE — PROGRESS NOTES
Edwin Leahy is a 31 y.o. male who was seen by synchronous (real-time) audio-video technology on 3/27/2020. Consent:  He and/or his healthcare decision maker is aware that this patient-initiated Telehealth encounter is a billable service, with coverage as determined by his insurance carrier. He is aware that he may receive a bill and has provided verbal consent to proceed: Yes    I was at home while conducting this encounter. Assessment & Plan:   Diagnoses and all orders for this visit:    1. Elevated blood pressure reading in office without diagnosis of hypertension  -     Blood Pressure Test Kit-Medium kit; 1 Each by Does Not Apply route two (2) times a day. Diet and exercise discussed. Exercise limited secondary the history of arthritis  Blood pressure kit ordered  Would like to re-evaluated blood pressure in 2 weeks if patient is able to obtain a cuff. Follow-up and Dispositions    · Return in about 2 weeks (around 4/10/2020). Coding Help - Use CPT Codes 15682-73704, 03010-38777 for Established and New Patients respectively, either employing EM elements or Time rules. Other codes (example consult codes) may also apply. I spent at least 15 minutes with this established patient, and >50% of the time was spent counseling and/or coordinating care regarding elevated blood pressure, lifestyle modification and the ACC recommendations to lower blood pressure. 712  Subjective:   Edwin Leahy was seen for Other (elevated blood pressure reading)    The patient presents for an Audio-visual teleconference appointment for elevated blood pressure reading. Patient was seen by orthopedic services on October 10, 2019 and again on February 19, 2020. Per the patient when he was in the practice his his blood pressure was elevated. On 10/10/2019 his blood pressure was 138/97 and on 2/19/2020 his blood pressure was 142/94. On both office visits patient pain levels were 5 and 7.   Patient was seen by his PCP Dr. Tere Newton on September 30, 2019 and his blood pressure was 137/83 and he again saw Dr. Tere Newton on June 27, 2019 at a blood pressure 117/83. Patient is unable to check his blood pressure in the home but he denies any chest pain or palpitation. He is negative for headache or dizziness. Prior to Admission medications    Medication Sig Start Date End Date Taking? Authorizing Provider   Blood Pressure Test Kit-Medium kit 1 Each by Does Not Apply route two (2) times a day. 3/27/20  Yes Lester Ching NP   lidocaine (LIDODERM) 5 % Apply patch to the affected area for 12 hours a day and remove for 12 hours a day. 9/30/19  Yes Johanny Walls MD   gabapentin (NEURONTIN) 400 mg capsule Take 400 mg by mouth three (3) times daily. Yes Provider, Historical   sertraline (ZOLOFT) 50 mg tablet Take  by mouth daily. Yes Provider, Historical   fluticasone propionate (FLONASE) 50 mcg/actuation nasal spray One squirt each nostril Daily 5/23/19  Yes Johanny Walls MD   capsaicin (ZOSTRIX) 0.033 % crea Apply to affected area twice a day  Indications: Pain associated with Arthritis 5/22/19  Yes Johanny Walls MD   meloxicam (MOBIC) 15 mg tablet Take 1 Tab by mouth daily. Indications: pain left knee 6/27/19 3/27/20  Johanny Walls MD   acetaminophen (TYLENOL PO) Take  by mouth.  3/27/20  Provider, Historical     No Known Allergies    Patient Active Problem List    Diagnosis Date Noted    Severe obesity ([facility] 09/23/2019     Current Outpatient Medications   Medication Sig Dispense Refill    Blood Pressure Test Kit-Medium kit 1 Each by Does Not Apply route two (2) times a day. 1 Kit 0    lidocaine (LIDODERM) 5 % Apply patch to the affected area for 12 hours a day and remove for 12 hours a day. 1 Each 6    gabapentin (NEURONTIN) 400 mg capsule Take 400 mg by mouth three (3) times daily.  sertraline (ZOLOFT) 50 mg tablet Take  by mouth daily.       fluticasone propionate (FLONASE) 50 mcg/actuation nasal spray One squirt each nostril Daily 1 Bottle 6    capsaicin (ZOSTRIX) 0.033 % crea Apply to affected area twice a day  Indications: Pain associated with Arthritis 56.6 g 6     No Known Allergies  Past Medical History:   Diagnosis Date    Arthritis      Past Surgical History:   Procedure Laterality Date    HX ACL RECONSTRUCTION      HX ORTHOPAEDIC      L knee surgery      Social History     Tobacco Use    Smoking status: Never Smoker    Smokeless tobacco: Never Used   Substance Use Topics    Alcohol use: Yes     Comment: social        ROS     Constitutional: No apparent distress noted  General- negative for fever, chills or fatigue  Eyes- negative visual changes  CV- denies chest pain, palpitation  Pul: negative cough or SOB  GI: negative nausea, flank pain, diarrhea, constipation  Urinary:- No dysuria or polyuria  MS- negative myalgia, negative joint pain  Neuro- negative headache, dizziness or weakness  Skin- negative for rashes or lesions. PHYSICAL EXAMINATION:  [ INSTRUCTIONS:  \"[x]\" Indicates a positive item  \"[]\" Indicates a negative item  -- DELETE ALL ITEMS NOT EXAMINED]  Vital Signs: (As obtained by patient/caregiver at home)  There were no vitals taken for this visit.      Constitutional: [x] Appears well-developed and well-nourished [x] No apparent distress      [] Abnormal -     Mental status: [x] Alert and awake  [x] Oriented to person/place/time [x] Able to follow commands    [] Abnormal -     Eyes:   EOM    [x]  Normal    [] Abnormal -   Sclera  [x]  Normal    [] Abnormal -          Discharge [x]  None visible   [] Abnormal -     HENT: [x] Normocephalic, atraumatic  [] Abnormal -   [x] Mouth/Throat: Mucous membranes are moist    External Ears [x] Normal  [] Abnormal -    Neck: [x] No visualized mass [] Abnormal -     Pulmonary/Chest: [x] Respiratory effort normal   [x] No visualized signs of difficulty breathing or respiratory distress        [] Abnormal - Musculoskeletal:   [x] Normal gait with no signs of ataxia         [x] Normal range of motion of neck        [] Abnormal -     Neurological:        [x] No Facial Asymmetry (Cranial nerve 7 motor function) (limited exam due to video visit)          [x] No gaze palsy        [] Abnormal -          Skin:        [x] No significant exanthematous lesions or discoloration noted on facial skin         [] Abnormal -            Psychiatric:       [x] Normal Affect [] Abnormal -        [x] No Hallucinations    Other pertinent observable physical exam findings:- none    We discussed the expected course, resolution and complications of the diagnosis(es) in detail. Medication risks, benefits, costs, interactions, and alternatives were discussed as indicated. I advised him to contact the office if his condition worsens, changes or fails to improve as anticipated. He expressed understanding with the diagnosis(es) and plan. Pursuant to the emergency declaration under the [facility] waiver authority and the [employer] Act, this Virtual  Visit was conducted, with patient's consent, to reduce the patient's risk of exposure to COVID-19 and provide continuity of care for an established patient. Services were provided through a video synchronous discussion virtually to substitute for in-person clinic visit.     Tripp Perez NP

## 2020-04-09 DIAGNOSIS — M25.562 LEFT KNEE PAIN, UNSPECIFIED CHRONICITY: ICD-10-CM

## 2020-04-09 RX ORDER — LIDOCAINE 50 MG/G
PATCH TOPICAL
Qty: 1 EACH | Refills: 6 | Status: SHIPPED | OUTPATIENT
Start: 2020-04-09 | End: 2021-04-12

## 2020-04-09 RX ORDER — CAPSAICIN 0.033 %
CREAM (GRAM) TOPICAL
Qty: 56.6 G | Refills: 6 | Status: SHIPPED | OUTPATIENT
Start: 2020-04-09 | End: 2022-01-10

## 2020-04-09 NOTE — TELEPHONE ENCOUNTER
Last seen 09/30/19  Next appt  None    Requested Prescriptions     Pending Prescriptions Disp Refills    lidocaine (LIDODERM) 5 % 1 Each 6     Sig: Apply patch to the affected area for 12 hours a day and remove for 12 hours a day.     capsaicin (Zostrix) 0.033 % crea 56.6 g 6     Sig: Apply to affected area twice a day  Indications: pain associated with arthritis

## 2020-04-22 ENCOUNTER — VIRTUAL VISIT (OUTPATIENT)
Dept: FAMILY MEDICINE CLINIC | Facility: CLINIC | Age: 30
End: 2020-04-22

## 2020-04-22 DIAGNOSIS — F41.8 ANXIETY WITH DEPRESSION: ICD-10-CM

## 2020-04-22 DIAGNOSIS — R03.0 ELEVATED BLOOD PRESSURE READING IN OFFICE WITHOUT DIAGNOSIS OF HYPERTENSION: Primary | ICD-10-CM

## 2020-04-22 DIAGNOSIS — M25.562 LEFT KNEE PAIN, UNSPECIFIED CHRONICITY: ICD-10-CM

## 2020-04-22 NOTE — PROGRESS NOTES
Consent: Ernesto Box, who was seen by synchronous (real-time) audio-video technology, and/or his healthcare decision maker, is aware that this patient-initiated, Telehealth encounter on 4/22/2020 is a billable service, with coverage as determined by his insurance carrier. He is aware that he may receive a bill and has provided verbal consent to proceed: Yes. This service was provided through telehealth audiovisual technology. The patient was at his residence and I was at [facility],and no one else participated in the service. The primary encounter diagnosis was Elevated blood pressure reading in office without diagnosis of hypertension. Diagnoses of Left knee pain, unspecified chronicity and Anxiety with depression were also pertinent to this visit. ASSESSMENT and PLAN    ICD-10-CM ICD-9-CM    1. Elevated blood pressure reading in office without diagnosis of hypertension R03.0 796.2    2. Left knee pain, unspecified chronicity M25.562 719.46    3. Anxiety with depression F41.8 300.4          Health Maintenance Due   Topic Date Due    DTaP/Tdap/Td series (1 - Tdap) 01/12/2013           712  Subjective:   Ernesto Box is a 31 y.o. male who was seen for follow-up. The patient states he has been in the bed,   Knee gave out and he fell. Apparently 2 weeks ago at work. He was walking to the shelf, and it\" gave out\". The injections don't work long. Physical therapy does not help. Last ortho visit was February. He will call ortho for follow-up. Psychiatrist  Video call with him today also to be scheduled. He does have anxiety and some depression related to his illness.   Hypertension  The patient has no headache lightheadedness chest pain or pressure shortness of breath orthopnea edema nausea weakness or paresthesias  BP Readings from Last 3 Encounters:   02/19/20 (!) 142/94   10/10/19 (!) 138/97   10/03/19 125/90     He is now on Prazosin for blood pressure and nightmares per psychiatry      In October the patient's left knee was injected with 2 cc of Euflexxa he also had an injection of triamcinolone in the area Marcaine was included in the injection. No surgery planned at this time. The patient is being followed exertion psych. Prior to Admission medications    Medication Sig Start Date End Date Taking? Authorizing Provider   lidocaine (LIDODERM) 5 % Apply patch to the affected area for 12 hours a day and remove for 12 hours a day. 4/9/20   Sun Eden MD   capsaicin (Zostrix) 0.033 % crea Apply to affected area twice a day  Indications: pain associated with arthritis 4/9/20   Sun Eden MD   Blood Pressure Test Kit-Medium kit 1 Each by Does Not Apply route two (2) times a day. 3/27/20   Yousuf Guerra NP   gabapentin (NEURONTIN) 400 mg capsule Take 400 mg by mouth three (3) times daily. Provider, Historical   sertraline (ZOLOFT) 50 mg tablet Take  by mouth daily. Provider, Historical   fluticasone propionate (FLONASE) 50 mcg/actuation nasal spray One squirt each nostril Daily 5/23/19   Sun Eden MD     No Known Allergies  has Severe obesity ([address] on their problem list.  Past Surgical History:   Procedure Laterality Date    HX ACL RECONSTRUCTION      HX ORTHOPAEDIC      L knee surgery        . Review of Systems   Respiratory: Negative for cough, shortness of breath and wheezing. Musculoskeletal: Positive for joint pain. Psychiatric/Behavioral: Positive for depression. Objective: There were no vitals taken for this visit. General: alert, cooperative, no distress   Mental  status: normal mood, behavior, speech, dress, motor activity, and thought processes, able to follow commands   HENT: NCAT   Neck: no visualized mass   Musculoskeletal:  I can see the scar and what looks like a slightly puffy medial knee.    Resp: no respiratory distress   Neuro: no gross deficits   Skin: no discoloration or lesions of concern on visible areas   Psychiatric: normal affect, consistent with stated mood, no evidence of hallucinations     Additional exam findings:   No results found for this or any previous visit. We discussed the expected course, resolution and complications of the diagnosis(es) in detail. Medication risks, benefits, costs, interactions, and alternatives were discussed as indicated. I advised him to contact the office if his condition worsens, changes or fails to improve as anticipated. He expressed understanding with the diagnosis(es) and plan. Lennox Abler. is a 31 y.o. male being evaluated by a video visit encounter for concerns as above. A caregiver was present when appropriate. Due to this being a TeleHealth encounter (During TSMBY-98 public health emergency), evaluation of the following organ systems was limited: Vitals/Constitutional/EENT/Resp/CV/GI//MS/Neuro/Skin/Heme-Lymph-Imm. Pursuant to the emergency declaration under the [facility]note 2[facility] waiver authority and the [employer] and [employer] Act, this Virtual  Visit was conducted, with patient's (and/or legal guardian's) consent, to reduce the patient's risk of exposure to COVID-19 and provide necessary medical care. Malu Thomas MD      This note was done with the assistance of dragon speech software.   Some inadvertent errors or omissions may be present

## 2020-05-15 ENCOUNTER — TELEPHONE (OUTPATIENT)
Dept: FAMILY MEDICINE CLINIC | Facility: CLINIC | Age: 30
End: 2020-05-15

## 2020-05-15 NOTE — TELEPHONE ENCOUNTER
Patient checking on status of disability papers dropped off 2 weeks ago. He says his hearing is coming up and needs the paperwork next week. Please advise.

## 2020-05-18 NOTE — TELEPHONE ENCOUNTER
TC was made to pt I left pt a VM to let pt know that pt form is complete and ready for  if pt has any questions pt can give our office a call back.

## 2020-07-27 NOTE — PROGRESS NOTES
Consent: Christina Simpson, who was seen by synchronous (real-time) audio-video technology, and/or his healthcare decision maker, is aware that this patient-initiated, Telehealth encounter on 7/28/2020 is a billable service, with coverage as determined by his insurance carrier. He is aware that he may receive a bill and has provided verbal consent to proceed: Yes. The patient was in a vehicle that he can stop and get out of for exam and I was at the offices of the [address] [place] [address] no one else participated in the service. ASSESSMENT and PLAN    ICD-10-CM ICD-9-CM    1. Elevated blood pressure reading in office without diagnosis of hypertension  R03.0 796.2 lisinopril-hydroCHLOROthiazide (PRINZIDE, ZESTORETIC) 10-12.5 mg per tablet    Initiate Prinzide and follow. 2. Left knee pain, unspecified chronicity  M25.562 719.46 REFERRAL TO PAIN MANAGEMENT      celecoxib (CELEBREX) 50 mg capsule    Trial Celebrex. Continue Neurontin. Physical therapy. Pain management   3. Anxiety with depression  F41.8 300.4     Will follow along with the counselor. the following changes in treatment are made: We went ahead and stop the Neurontin. Will trial Celebrex. The patient is to continue physical therapy ordered by Ortho. He will follow-up with pain management. He is considering going ahead with surgery of the knee. His blood pressure has been elevated and we have started medications. Health Maintenance Due   Topic Date Due    DTaP/Tdap/Td series (1 - Tdap) 01/12/2013         712  Subjective:   Christina Simpson is a 31 y.o. male who was seen for follow-up. The patient has been in [facility] pain management doctor  Ortho recommended surgery  Continuing with physical therapy,  Hypertension  The pressure is still elevated  The patient has had no problem with the medication.  The patient has no headaches, visual changes, chest pain or pressure,dyspnea, orthopnea, abdominal pain, dysuria, weakness, or paresthesias. BP Readings from Last 3 Encounters:   02/19/20 (!) 142/94   10/10/19 (!) 138/97   10/03/19 125/90                             Current Outpatient Medications   Medication Sig    lidocaine (LIDODERM) 5 % Apply patch to the affected area for 12 hours a day and remove for 12 hours a day.  capsaicin (Zostrix) 0.033 % crea Apply to affected area twice a day  Indications: pain associated with arthritis    Blood Pressure Test Kit-Medium kit 1 Each by Does Not Apply route two (2) times a day.  gabapentin (NEURONTIN) 400 mg capsule Take 400 mg by mouth three (3) times daily.  sertraline (ZOLOFT) 50 mg tablet Take  by mouth daily.  fluticasone propionate (FLONASE) 50 mcg/actuation nasal spray One squirt each nostril Daily     No current facility-administered medications for this visit. Patient not presently taking the gabapentin. No Known Allergies  has Severe obesity (HCC) on their problem list.  Past Surgical History:   Procedure Laterality Date    HX ACL RECONSTRUCTION      HX ORTHOPAEDIC      L knee surgery      Relationships   Social connections    Talks on phone: Not on file    Gets together: Not on file    Attends Spiritism service: Not on file    Active member of club or organization: Not on file    Attends meetings of clubs or organizations: Not on file    Relationship status: Not on file     family history is not on file. Review of Systems   Constitutional: Negative for chills, fever and malaise/fatigue. HENT: Negative for congestion and sore throat. Eyes: Negative for blurred vision and redness. Respiratory: Negative for cough, shortness of breath and wheezing. Cardiovascular: Negative for chest pain and leg swelling. Gastrointestinal: Negative for abdominal pain, blood in stool, constipation, diarrhea and heartburn. Genitourinary: Negative for dysuria and urgency. Musculoskeletal: Positive for joint pain (Left knee pain). Negative for myalgias. Neurological: Negative for dizziness, sensory change, speech change and focal weakness. Endo/Heme/Allergies: Does not bruise/bleed easily. Psychiatric/Behavioral: Positive for depression. The patient is nervous/anxious. Physical Exam  Constitutional:       General: He is not in acute distress. HENT:      Right Ear: External ear normal.      Left Ear: External ear normal.      Nose: Nose normal.      Mouth/Throat:      Mouth: Mucous membranes are moist.      Pharynx: Oropharynx is clear. Eyes:      General: No scleral icterus. Extraocular Movements: Extraocular movements intact. Pupils: Pupils are equal, round, and reactive to light. Pulmonary:      Effort: Pulmonary effort is normal.   Musculoskeletal:         General: No swelling. Right lower leg: No edema. Left lower leg: No edema. Skin:     Coloration: Skin is not jaundiced. Findings: No erythema. Neurological:      Mental Status: He is alert and oriented to person, place, and time. Coordination: Coordination normal.      Gait: Gait normal.   Psychiatric:         Mood and Affect: Mood normal.         Behavior: Behavior normal.         We discussed the expected course, resolution and complications of the diagnosis(es) in detail. Medication risks, benefits, costs, interactions, and alternatives were discussed as indicated. I advised him to contact the office if his condition worsens, changes or fails to improve as anticipated. He expressed understanding with the diagnosis(es) and plan. Pa Hodge. is a 31 y.o. male being evaluated by a video visit encounter for concerns as above. A caregiver was present when appropriate. Due to this being a TeleHealth encounter (During RXPUW-28 public health emergency), evaluation of the following organ systems was limited: Vitals/Constitutional/EENT/Resp/CV/GI//MS/Neuro/Skin/Heme-Lymph-Imm.   Pursuant to the emergency declaration under the [address] Emergencies Act, 1135 waiver authority and the Coronavirus Preparedness and Response Supplemental Appropriations Act, this Virtual  Visit was conducted, with patient's (and/or legal guardian's) consent, to reduce the patient's risk of exposure to COVID-19 and provide necessary medical care. Cherri Wilcox MD    This note was done with the assistance of dragon speech software.   Some inadvertent errors or omissions may be present

## 2020-07-28 ENCOUNTER — VIRTUAL VISIT (OUTPATIENT)
Dept: FAMILY MEDICINE CLINIC | Facility: CLINIC | Age: 30
End: 2020-07-28

## 2020-07-28 DIAGNOSIS — R03.0 ELEVATED BLOOD PRESSURE READING IN OFFICE WITHOUT DIAGNOSIS OF HYPERTENSION: Primary | ICD-10-CM

## 2020-07-28 DIAGNOSIS — M25.562 LEFT KNEE PAIN, UNSPECIFIED CHRONICITY: ICD-10-CM

## 2020-07-28 DIAGNOSIS — F41.8 ANXIETY WITH DEPRESSION: ICD-10-CM

## 2020-07-28 RX ORDER — LISINOPRIL AND HYDROCHLOROTHIAZIDE 10; 12.5 MG/1; MG/1
1 TABLET ORAL DAILY
Qty: 30 TAB | Refills: 3 | Status: SHIPPED | OUTPATIENT
Start: 2020-07-28 | End: 2021-01-27 | Stop reason: DRUGHIGH

## 2020-07-28 RX ORDER — CELECOXIB 50 MG/1
50 CAPSULE ORAL 2 TIMES DAILY
Qty: 60 CAP | Refills: 2 | Status: SHIPPED | OUTPATIENT
Start: 2020-07-28 | End: 2020-10-26

## 2020-10-16 DIAGNOSIS — J30.9 ALLERGIC RHINITIS, UNSPECIFIED SEASONALITY, UNSPECIFIED TRIGGER: ICD-10-CM

## 2020-10-16 RX ORDER — FLUTICASONE PROPIONATE 50 MCG
SPRAY, SUSPENSION (ML) NASAL
Qty: 1 BOTTLE | Refills: 6 | Status: SHIPPED | OUTPATIENT
Start: 2020-10-16 | End: 2021-10-26

## 2020-12-18 LAB — SARS-COV-2, NAA: DETECTED

## 2021-01-27 ENCOUNTER — VIRTUAL VISIT (OUTPATIENT)
Dept: FAMILY MEDICINE CLINIC | Age: 31
End: 2021-01-27
Payer: COMMERCIAL

## 2021-01-27 DIAGNOSIS — F41.8 ANXIETY WITH DEPRESSION: ICD-10-CM

## 2021-01-27 DIAGNOSIS — M25.562 LEFT KNEE PAIN, UNSPECIFIED CHRONICITY: ICD-10-CM

## 2021-01-27 DIAGNOSIS — R03.0 ELEVATED BLOOD PRESSURE READING IN OFFICE WITHOUT DIAGNOSIS OF HYPERTENSION: Primary | ICD-10-CM

## 2021-01-27 PROCEDURE — 99213 OFFICE O/P EST LOW 20 MIN: CPT | Performed by: EMERGENCY MEDICINE

## 2021-01-27 RX ORDER — LISINOPRIL AND HYDROCHLOROTHIAZIDE 20; 25 MG/1; MG/1
1 TABLET ORAL DAILY
Qty: 90 TAB | Refills: 3 | Status: SHIPPED | OUTPATIENT
Start: 2021-01-27 | End: 2021-07-08 | Stop reason: DRUGHIGH

## 2021-01-27 RX ORDER — CELECOXIB 50 MG/1
50 CAPSULE ORAL 2 TIMES DAILY
Qty: 1180 CAP | Refills: 3 | Status: SHIPPED | OUTPATIENT
Start: 2021-01-27 | End: 2021-04-27

## 2021-01-27 NOTE — PROGRESS NOTES
Consent: Ernesto Box, who was seen by synchronous (real-time) audio-video technology, and/or his healthcare decision maker, is aware that this patient-initiated, Telehealth encounter on 1/27/2021 is a billable service, with coverage as determined by his insurance carrier. He is aware that he may receive a bill and has provided verbal consent to proceed: Yes. The patient was at home and I was at the offices of the [address] [place] [address] no one else participated in the service. ICD-10-CM ICD-9-CM    1. Elevated blood pressure reading in office without diagnosis of hypertension  R03.0 796.2     This is a chronic problem that is not controlled. The plan is to increase the Prinzide to 20/25 daily. Follow   2. Left knee pain, unspecified chronicity  M25.562 719.46     We will follow this along with the specialist that the patient sees for this problem. No change in medication or treatment on our part at this time. 3. Anxiety with depression  F41.8 300.4     We will follow this along with the specialist that the patient sees for this problem. No change in medication or treatment on our part at this time. lab results and schedule of future lab studies reviewed with patient      The patient was seen on01/27/21. The issues addressed includedThere were no encounter diagnoses. .  Hypertension, poor control. Increase Prinzide to 20/25 daily. Patient is continue to follow-up with counseling for anxiety and pain management. No orders of the defined types were placed in this encounter. Health Maintenance Due   Topic Date Due    COVID-19 Vaccine (1 of 2) 01/12/2008    DTaP/Tdap/Td series (1 - Tdap) 01/12/2013    Flu Vaccine (1) 09/01/2020       Subjective:   Ernesto Box is a 36 y.o. male who is being seen in follow-up. The patient has Severe obesity ([address] on their problem list..  The patient has the following problems.   Hypertension, on Prinzide trial.Knee pain on Celebrex and following up with pain management. Anxiety with depression being followed by a counselor. COVID-19 infection  COVID caught end of December. 2020. The patient states he felt weak had chills and loss of taste. His mother also carotid possibly from him. Both of them have recovered. Right knee pain  The patient has continued pain in the knee laterally. It is aggravated by movement. The patient is now in pain management and there are plans for an injection in the area to help with the pain. Anxiety   The patient has ongoing anxiety and is being followed by a counselor whose last name is Nahed. He states that he is improving with no SI or HI. Hypertension  The patient has had no problem with the medication. The patient has no headaches, visual changes, chest pain or pressure,dyspnea, orthopnea, abdominal pain, dysuria, weakness, or paresthesias. He has no lower readings. He states that the blood pressure is still slightly elevated. BP Readings from Last 3 Encounters:   02/19/20 (!) 142/94   10/10/19 (!) 138/97   10/03/19 125/90      Key CAD CHF Meds             lisinopril-hydroCHLOROthiazide (PRINZIDE, ZESTORETIC) 10-12.5 mg per tablet Take 1 Tab by mouth daily. Current Outpatient Medications   Medication Sig    fluticasone propionate (FLONASE) 50 mcg/actuation nasal spray USE ONE SQUIRT IN EACH NOSTRIL DAILY    lisinopril-hydroCHLOROthiazide (PRINZIDE, ZESTORETIC) 10-12.5 mg per tablet Take 1 Tab by mouth daily.  lidocaine (LIDODERM) 5 % Apply patch to the affected area for 12 hours a day and remove for 12 hours a day.  capsaicin (Zostrix) 0.033 % crea Apply to affected area twice a day  Indications: pain associated with arthritis    Blood Pressure Test Kit-Medium kit 1 Each by Does Not Apply route two (2) times a day.  gabapentin (NEURONTIN) 400 mg capsule Take 400 mg by mouth three (3) times daily.     sertraline (ZOLOFT) 50 mg tablet Take  by mouth daily. No current facility-administered medications for this visit. No Known Allergies  has Severe obesity (HCC) on their problem list.  Past Surgical History:   Procedure Laterality Date    HX ACL RECONSTRUCTION      HX ORTHOPAEDIC      L knee surgery      Relationships   Social connections    Talks on phone: Not on file    Gets together: Not on file    Attends Holiness service: Not on file    Active member of club or organization: Not on file    Attends meetings of clubs or organizations: Not on file    Relationship status: Not on file     family history is not on file. Review of Systems   Constitutional: Negative for chills, fever and malaise/fatigue. HENT: Negative for congestion and sore throat. Eyes: Negative for blurred vision and redness. Respiratory: Negative for cough, shortness of breath and wheezing. Cardiovascular: Negative for chest pain and leg swelling. Gastrointestinal: Negative for abdominal pain, blood in stool, constipation, diarrhea and heartburn. Genitourinary: Negative for dysuria and urgency. Musculoskeletal: Positive for joint pain (Left knee pain). Negative for myalgias. Neurological: Negative for dizziness, sensory change, speech change and focal weakness. Endo/Heme/Allergies: Does not bruise/bleed easily. Psychiatric/Behavioral: Positive for depression. The patient is nervous/anxious. Physical Exam  Constitutional:       General: He is not in acute distress. HENT:      Right Ear: External ear normal.      Left Ear: External ear normal.      Nose: Nose normal.      Mouth/Throat:      Mouth: Mucous membranes are moist.      Pharynx: Oropharynx is clear. Eyes:      General: No scleral icterus. Extraocular Movements: Extraocular movements intact. Pupils: Pupils are equal, round, and reactive to light. Pulmonary:      Effort: Pulmonary effort is normal.   Musculoskeletal:         General: No swelling. Right lower leg: No edema. Left lower leg: No edema. Comments: Well-healed lateral knee scar. Flexion is to 90 degrees. No edema or swelling locally noted. No redness noted   Skin:     Coloration: Skin is not jaundiced. Findings: No erythema. Neurological:      Mental Status: He is alert and oriented to person, place, and time. Coordination: Coordination normal.      Gait: Gait normal.   Psychiatric:         Mood and Affect: Mood normal.         Behavior: Behavior normal.        We discussed the expected course, resolution and complications of the diagnosis(es) in detail. Medication risks, benefits, costs, interactions, and alternatives were discussed as indicated. I advised him to contact the office if his condition worsens, changes or fails to improve as anticipated. He expressed understanding with the diagnosis(es) and plan. Kathya Esquivel. is a 36 y.o. male being evaluated by a video visit encounter for concerns as above. A caregiver was present when appropriate. Due to this being a TeleHealth encounter (During BPEIC-37 public health emergency), evaluation of the following organ systems was limited: Vitals/Constitutional/EENT/Resp/CV/GI//MS/Neuro/Skin/Heme-Lymph-Imm. Pursuant to the emergency declaration under the [facility], 1135 waiver authority and the [employer] Act, this Virtual  Visit was conducted, with patient's (and/or legal guardian's) consent, to reduce the patient's risk of exposure to COVID-19 and provide necessary medical care. This note was done with the assistance of dragon speech software.   Some inadvertent errors or omissions may be present

## 2021-03-03 ENCOUNTER — TELEPHONE (OUTPATIENT)
Dept: FAMILY MEDICINE CLINIC | Age: 31
End: 2021-03-03

## 2021-04-12 DIAGNOSIS — M25.562 LEFT KNEE PAIN, UNSPECIFIED CHRONICITY: ICD-10-CM

## 2021-04-12 RX ORDER — LIDOCAINE 50 MG/G
PATCH TOPICAL
Qty: 30 PATCH | Refills: 6 | Status: SHIPPED | OUTPATIENT
Start: 2021-04-12 | End: 2022-01-10

## 2021-05-13 ENCOUNTER — TELEPHONE (OUTPATIENT)
Dept: ORTHOPEDIC SURGERY | Age: 31
End: 2021-05-13

## 2021-05-13 ENCOUNTER — OFFICE VISIT (OUTPATIENT)
Dept: ORTHOPEDIC SURGERY | Age: 31
End: 2021-05-13
Payer: COMMERCIAL

## 2021-05-13 VITALS
WEIGHT: 270 LBS | BODY MASS INDEX: 37.8 KG/M2 | HEART RATE: 94 BPM | HEIGHT: 71 IN | RESPIRATION RATE: 16 BRPM | OXYGEN SATURATION: 98 %

## 2021-05-13 DIAGNOSIS — Z98.890 S/P ACL RECONSTRUCTION: ICD-10-CM

## 2021-05-13 DIAGNOSIS — M17.12 PRIMARY OSTEOARTHRITIS OF LEFT KNEE: Primary | ICD-10-CM

## 2021-05-13 DIAGNOSIS — M25.562 CHRONIC PAIN OF LEFT KNEE: ICD-10-CM

## 2021-05-13 DIAGNOSIS — G89.29 CHRONIC PAIN OF LEFT KNEE: ICD-10-CM

## 2021-05-13 PROCEDURE — 20610 DRAIN/INJ JOINT/BURSA W/O US: CPT | Performed by: SPECIALIST

## 2021-05-13 PROCEDURE — 99213 OFFICE O/P EST LOW 20 MIN: CPT | Performed by: SPECIALIST

## 2021-05-13 RX ORDER — BETAMETHASONE SODIUM PHOSPHATE AND BETAMETHASONE ACETATE 3; 3 MG/ML; MG/ML
3 INJECTION, SUSPENSION INTRA-ARTICULAR; INTRALESIONAL; INTRAMUSCULAR; SOFT TISSUE ONCE
Status: COMPLETED | OUTPATIENT
Start: 2021-05-13 | End: 2021-05-13

## 2021-05-13 RX ADMIN — BETAMETHASONE SODIUM PHOSPHATE AND BETAMETHASONE ACETATE 3 MG: 3; 3 INJECTION, SUSPENSION INTRA-ARTICULAR; INTRALESIONAL; INTRAMUSCULAR; SOFT TISSUE at 11:28

## 2021-05-13 NOTE — TELEPHONE ENCOUNTER
Patient has [employer] better health, their preferred drug is Visco 3 or gel-one can a new order be placed?

## 2021-05-13 NOTE — PROGRESS NOTES
Patient: Jessica Woods MRN: [id number]       SSN: [id number]  YOB: 1990        AGE: 36 y.o. SEX: male    PCP: Jia Chambers MD  05/13/21    Chief Complaint   Patient presents with    Knee Pain     left knee req inj. HISTORY:  Jessica Woods is a 36 y.o. male who is seen for increased left knee pain. He complains of stiffness and locking. He has occasional giving way episodes. He uses a cane when stair climbing. He has been experiencing knee pain for the past 2 years. He sustained a severe left knee injury in June 2017. He fell into a ditch while walking. He sustained an ACL tear, MCL tear, and a left femoral medial epicondylar avulsion fracture. He underwent left ACL repair in [place] a month after injury. He has felt constant pain since his surgery. He notes intermittent left knee swelling. He notes pain with standing, walking, and stair climbing. He experiences startup pain after sitting. He was seen by Dr. Maria M Landeros in May. He states that he did get along well Dr. Maria M Landeros because he was rude according to Mr. Ermias Gilman. He completed a successful Euflexxa series on 10/10/19. Pain Assessment  5/13/2021   Location of Pain Knee   Location Modifiers Left   Severity of Pain 7   Quality of Pain Other (Comment); Aching;Locking   Quality of Pain Comment swelling   Duration of Pain Persistent   Frequency of Pain Constant   Aggravating Factors Stretching;Bending;Straightening;Walking;Standing   Aggravating Factors Comment -   Limiting Behavior Yes   Relieving Factors Rest   Result of Injury -   Work-Related Injury -   Type of Injury -     Occupation, etc:  Mr. Ermias Gilman receives social security disability benefits for his left knee. 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 He moved to this area from [place] to be closer to his mother. He lives in [place] with his brother.  He gained 30 pounds recently due to inactivity as a result of the Coronavirus pandemic. He is not able to exercise much due to his knee pain. He is not diabetic or hypertensive. Mr. Rakesh Philip weighs 270 lbs and is 6'0\" tall. No results found for: HBA1C, HGBE8, JHD0ZGAV, HQH0DRDV, AVA7WLPQ  Weight Metrics 5/13/2021 2/19/2020 10/10/2019 10/3/2019 9/30/2019 9/23/2019 8/26/2019   Weight 270 lb 262 lb 9.6 oz 263 lb 3.2 oz 262 lb 3.2 oz 263 lb 261 lb 256 lb 12.8 oz   BMI 37.66 kg/m2 35.61 kg/m2 35.7 kg/m2 35.56 kg/m2 35.67 kg/m2 35.4 kg/m2 34.83 kg/m2       Patient Active Problem List   Diagnosis Code    [facility] E66.01     REVIEW OF SYSTEMS: All Below are Negative except: See HPI   Constitutional: negative for fever, chills, and weight loss. Cardiovascular: negative for chest pain, claudication, leg swelling, SOB, ROMANO   Gastrointestinal: Negative for pain, N/V/C/D, Blood in stool or urine, dysuria, hematuria, incontinence, pelvic pain. Musculoskeletal: See HPI   Neurological: Negative for dizziness and weakness. Negative for headaches, Visual changes, confusion, seizures   Phychiatric/Behavioral: Negative for depression, memory loss, substance abuse. Extremities: Negative for hair changes, rash, or skin lesion changes. Hematologic: Negative for bleeding problems, bruising, pallor or swollen lymph nodes   Peripheral Vascular: No calf pain, no circulation deficits.     Social History     Socioeconomic History    Marital status: SINGLE     Spouse name: Not on file    Number of children: Not on file    Years of education: Not on file    Highest education level: Not on file   Occupational History    Not on file   Social Needs    Financial resource strain: Not on file    Food insecurity     Worry: Not on file     Inability: Not on file    Transportation needs     Medical: Not on file     Non-medical: Not on file   Tobacco Use    Smoking status: Never Smoker    Smokeless tobacco: Never Used   Substance and Sexual Activity    Alcohol use: Yes     Comment: social     Drug use: Never    Sexual activity: Yes     Partners: Male   Lifestyle    Physical activity     Days per week: Not on file     Minutes per session: Not on file    Stress: Not on file   Relationships    Social connections     Talks on phone: Not on file     Gets together: Not on file     Attends Cheondoism service: Not on file     Active member of club or organization: Not on file     Attends meetings of clubs or organizations: Not on file     Relationship status: Not on file    Intimate partner violence     Fear of current or ex partner: Not on file     Emotionally abused: Not on file     Physically abused: Not on file     Forced sexual activity: Not on file   Other Topics Concern    Not on file   Social History Narrative    Not on file      No Known Allergies   Current Outpatient Medications   Medication Sig    lidocaine (LIDODERM) 5 % APPLY PATCH TO THE AFFECTED AREA FOR 12 HOURS A DAY AND REMOVE FOR 12 HOURS A DAY.  lisinopril-hydroCHLOROthiazide (PRINZIDE, ZESTORETIC) 20-25 mg per tablet Take 1 Tab by mouth daily.  fluticasone propionate (FLONASE) 50 mcg/actuation nasal spray USE ONE SQUIRT IN EACH NOSTRIL DAILY    capsaicin (Zostrix) 0.033 % crea Apply to affected area twice a day  Indications: pain associated with arthritis    Blood Pressure Test Kit-Medium kit 1 Each by Does Not Apply route two (2) times a day.  gabapentin (NEURONTIN) 400 mg capsule Take 400 mg by mouth three (3) times daily.  sertraline (ZOLOFT) 50 mg tablet Take  by mouth daily. No current facility-administered medications for this visit.        PHYSICAL EXAMINATION:  Visit Vitals  Pulse 94   Resp 16   Ht 5' 11\" (1.803 m)   Wt 270 lb (122.5 kg)   SpO2 98%   BMI 37.66 kg/m²      ORTHO EXAMINATION:  Examination Right knee Left knee   Skin Intact Intact, well healed curving anteromedial ACL reconstruction scar   Range of motion 120-0 90-0   Effusion - -   Medial joint line tenderness - +   Lateral joint line tenderness - -   Popliteal tenderness - -   Osteophytes palpable - +   Bens - -   Patella crepitus - +   Anterior drawer - 1+   Lateral laxity - -   Medial laxity - -   Varus deformity - -   Valgus deformity - -   Pretibial edema - -   Calf tenderness - -     Using single point cane    TIME OUT:  Chart reviewed for the following:   Gemma Webb MD, have reviewed the History, Physical and updated the Allergic reactions for [address] performed immediately prior to start of procedure:  Gemma Webb MD, have performed the following reviews on Candy Kemp. prior to the start of the procedure:          * Patient was identified by name and date of birth   * Agreement on procedure being performed was verified  * Risks and Benefits explained to the patient  * Procedure site verified and marked as necessary  * Patient was positioned for comfort  * Consent was obtained     Time: 11:23 AM     Date of procedure: 5/13/2021  Procedure performed by:  Warner Gonzalez MD  Mr. Sandoval Christensen tolerated the procedure well with no complications. MRI LEFT KNEE 3/22/19 [facility]  Impression:  1. Surgical changes of prior ACL and MCL reconstruction with tendon grafts which  appear intact. 2. 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 3. Medial and lateral meniscal tears as discussed. 4. Low-grade chondrosis in the lateral compartment. RADIOGRAPHS:  XR LEFT KNEE 3/11/19 [facility]  IMPRESSION:  Three views - No fractures, no effusion, mild joint space narrowing, + osteophytes present. Kellgren Kory grade 1 screws present, tunnels c/w ACL reconstruction    IMPRESSION:      ICD-10-CM ICD-9-CM    1. Primary osteoarthritis of left knee  M17.12 715.16 betamethasone (CELESTONE) injection 3 mg      DRAIN/INJECT LARGE JOINT/BURSA      REFERRAL TO PHYSICAL THERAPY      PROCEDURE AUTHORIZATION TO    2. S/P ACL reconstruction  Z98.890 V45.89    3. Chronic pain of left knee  M25.562 719.46     G89.29 338.29      PLAN:  Continue permanent left knee restrictions. Consider visco supplementation if pain continues. He will start a brief course of outpatient physical therapy. Dietary counseling provided today. Start weight loss with low carb diet and intermittent fasting. After discussing treatment options, patient's left knee was injected with 4 cc Marcaine and 1/2 cc Celestone. There is no need for further surgery at this time. He will follow up as needed.       Scribed by Brooklyn Rojas ([address]) as dictated by Maria Dolores Marti MD

## 2021-08-20 ENCOUNTER — OFFICE VISIT (OUTPATIENT)
Dept: ORTHOPEDIC SURGERY | Age: 31
End: 2021-08-20
Payer: COMMERCIAL

## 2021-08-20 VITALS
TEMPERATURE: 96.9 F | OXYGEN SATURATION: 99 % | HEIGHT: 72 IN | RESPIRATION RATE: 16 BRPM | BODY MASS INDEX: 36.49 KG/M2 | WEIGHT: 269.4 LBS | HEART RATE: 82 BPM

## 2021-08-20 DIAGNOSIS — G89.29 CHRONIC PAIN OF LEFT KNEE: ICD-10-CM

## 2021-08-20 DIAGNOSIS — M17.12 PRIMARY OSTEOARTHRITIS OF LEFT KNEE: Primary | ICD-10-CM

## 2021-08-20 DIAGNOSIS — M25.562 CHRONIC PAIN OF LEFT KNEE: ICD-10-CM

## 2021-08-20 PROCEDURE — 20610 DRAIN/INJ JOINT/BURSA W/O US: CPT | Performed by: SPECIALIST

## 2021-08-20 PROCEDURE — 99213 OFFICE O/P EST LOW 20 MIN: CPT | Performed by: SPECIALIST

## 2021-08-20 RX ORDER — BETAMETHASONE SODIUM PHOSPHATE AND BETAMETHASONE ACETATE 3; 3 MG/ML; MG/ML
3 INJECTION, SUSPENSION INTRA-ARTICULAR; INTRALESIONAL; INTRAMUSCULAR; SOFT TISSUE ONCE
Status: COMPLETED | OUTPATIENT
Start: 2021-08-20 | End: 2021-08-20

## 2021-08-20 RX ORDER — PREGABALIN 150 MG/1
CAPSULE ORAL
COMMUNITY
Start: 2021-08-11

## 2021-08-20 RX ORDER — CLONAZEPAM 0.5 MG/1
TABLET ORAL
COMMUNITY
Start: 2021-08-17

## 2021-08-20 RX ADMIN — BETAMETHASONE SODIUM PHOSPHATE AND BETAMETHASONE ACETATE 3 MG: 3; 3 INJECTION, SUSPENSION INTRA-ARTICULAR; INTRALESIONAL; INTRAMUSCULAR; SOFT TISSUE at 14:42

## 2021-08-20 NOTE — PROGRESS NOTES
Patient: Mikel Wright MRN: [id number]       SSN: [id number]  YOB: 1990        AGE: 36 y.o. SEX: male    PCP: Misty Miller MD  08/20/21    Chief Complaint   Patient presents with    Knee Pain     left knee pain     HISTORY:  Mikel Wright is a 36 y.o. male who is seen for left knee pain. He complains of stiffness and locking. He has occasional giving way episodes. He uses a cane when stair climbing. He has been experiencing knee pain for the past 2 years. He sustained a severe left knee injury in June 2017. He fell into a ditch while walking. He sustained an ACL tear, MCL tear, and a left femoral medial epicondylar avulsion fracture. He underwent left ACL repair in [place] a month after injury. He feels tenderness over his incision site. He has felt constant pain since his surgery. He notes intermittent left knee swelling. He notes pain with standing, walking, and stair climbing. He experiences startup pain after sitting. He was seen by Dr. Anca Goyal in May. He states that he did get along well Dr. Anca Goyal because he was rude according to Mr. Anuel Leo. He completed a successful Euflexxa series on 10/10/19. He has tried a variety of conservative measures including cortisone injections, activity modification, bracing, NSAIDs and physical therapy. Pain Assessment  8/20/2021   Location of Pain Knee   Location Modifiers Left   Severity of Pain 5   Quality of Pain Aching   Quality of Pain Comment -   Duration of Pain Persistent   Frequency of Pain Constant   Aggravating Factors Walking;Standing   Aggravating Factors Comment -   Limiting Behavior -   Relieving Factors Elevation   Result of Injury No   Work-Related Injury -   Type of Injury -     Occupation, etc:  Mr. Anuel Leo receives social security disability benefits for his left knee. 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 He moved to this area from [place] to be closer to his mother.  He lives in [place] with his brother. He gained 30 pounds recently due to inactivity as a result of the Coronavirus pandemic. He has had trouble losing the weight. He is not able to exercise much due to his knee pain. He has been swimming for exercise lately. He is a  [employer] fan. He is not diabetic or hypertensive. Mr. Surjit Scanlon weighs 270 lbs and is 6'0\" tall. No results found for: HBA1C, LGE8NINV, PSO0AIEQ, PHF5SERG  Weight Metrics 8/20/2021 5/13/2021 2/19/2020 10/10/2019 10/3/2019 9/30/2019 9/23/2019   Weight 269 lb 6.4 oz 270 lb 262 lb 9.6 oz 263 lb 3.2 oz 262 lb 3.2 oz 263 lb 261 lb   BMI 36.54 kg/m2 37.66 kg/m2 35.61 kg/m2 35.7 kg/m2 35.56 kg/m2 35.67 kg/m2 35.4 kg/m2       Patient Active Problem List   Diagnosis Code    Severe obesity ([facility] [address] E66.01     REVIEW OF SYSTEMS: All Below are Negative except: See HPI   Constitutional: negative for fever, chills, and weight loss. Cardiovascular: negative for chest pain, claudication, leg swelling, SOB, ROMANO   Gastrointestinal: Negative for pain, N/V/C/D, Blood in stool or urine, dysuria, hematuria, incontinence, pelvic pain. Musculoskeletal: See HPI   Neurological: Negative for dizziness and weakness. Negative for headaches, Visual changes, confusion, seizures   Phychiatric/Behavioral: Negative for depression, memory loss, substance abuse. Extremities: Negative for hair changes, rash, or skin lesion changes. Hematologic: Negative for bleeding problems, bruising, pallor or swollen lymph nodes   Peripheral Vascular: No calf pain, no circulation deficits.     Social History     Socioeconomic History    Marital status: SINGLE     Spouse name: Not on file    Number of children: Not on file    Years of education: Not on file    Highest education level: Not on file   Occupational History    Not on file   Tobacco Use    Smoking status: Never Smoker    Smokeless tobacco: Never Used   Substance and Sexual Activity    Alcohol use: Yes     Comment: social     Drug use: Never    Sexual activity: Yes     Partners: Male   Other Topics Concern    Not on file   Social History Narrative    Not on file     Social Determinants of Health     Financial Resource Strain:     Difficulty of Paying Living Expenses:    Food Insecurity:     Worried About Running Out of Food in the Last Year:     [address] in the Last Year:    Transportation Needs:     Lack of Transportation (Medical):  Lack of Transportation (Non-Medical):    Physical Activity:     Days of Exercise per Week:     Minutes of Exercise per Session:    Stress:     Feeling of Stress :    Social Connections:     Frequency of Communication with Friends and Family:     Frequency of Social Gatherings with Friends and Family:     Attends [employer] Services:     Active Member of Clubs or Organizations:     Attends Club or Organization Meetings:     Marital Status:    Intimate Partner Violence:     Fear of Current or Ex-Partner:     Emotionally Abused:     Physically Abused:     Sexually Abused:       No Known Allergies   Current Outpatient Medications   Medication Sig    pregabalin (LYRICA) 150 mg capsule     clonazePAM (KlonoPIN) 0.5 mg tablet     lisinopril-hydroCHLOROthiazide (PRINZIDE, ZESTORETIC) 10-12.5 mg per tablet TAKE 1 TABLET BY MOUTH EVERY DAY    lidocaine (LIDODERM) 5 % APPLY PATCH TO THE AFFECTED AREA FOR 12 HOURS A DAY AND REMOVE FOR 12 HOURS A DAY.  fluticasone propionate (FLONASE) 50 mcg/actuation nasal spray USE ONE SQUIRT IN EACH NOSTRIL DAILY    sertraline (ZOLOFT) 50 mg tablet Take  by mouth daily.  capsaicin (Zostrix) 0.033 % crea Apply to affected area twice a day  Indications: pain associated with arthritis (Patient not taking: Reported on 8/20/2021)    Blood Pressure Test Kit-Medium kit 1 Each by Does Not Apply route two (2) times a day. (Patient not taking: Reported on 8/20/2021)    gabapentin (NEURONTIN) 400 mg capsule Take 400 mg by mouth three (3) times daily. (Patient not taking: Reported on 8/20/2021)     No current facility-administered medications for this visit. PHYSICAL EXAMINATION:  Visit Vitals  Pulse 82   Temp 96.9 °F (36.1 °C) (Temporal)   Resp 16   Ht 6' (1.829 m)   Wt 269 lb 6.4 oz (122.2 kg)   SpO2 99%   BMI 36.54 kg/m²      ORTHO EXAMINATION:  Examination Right knee Left knee   Skin Intact Intact, well healed curving anteromedial ACL reconstruction scar   Range of motion 120-0 100-0   Effusion - -   Medial joint line tenderness - +   Lateral joint line tenderness - -   Popliteal tenderness - -   Osteophytes palpable - +   Bens - -   Patella crepitus - +   Anterior drawer - 1+   Lateral laxity - -   Medial laxity - -   Varus deformity - -   Valgus deformity - -   Pretibial edema - -   Calf tenderness - -     Using single point cane    TIME OUT:  Chart reviewed for the following:   Jen Benavides MD, have reviewed the History, Physical and updated the Allergic reactions for [address] performed immediately prior to start of procedure:  Jen Benavides MD, have performed the following reviews on [place] [address]. prior to the start of the procedure:          * Patient was identified by name and date of birth   * Agreement on procedure being performed was verified  * Risks and Benefits explained to the patient  * Procedure site verified and marked as necessary  * Patient was positioned for comfort  * Consent was obtained     Time: 2:24 PM     Date of procedure: 8/20/2021  Procedure performed by:  Luis Enrique Whitney MD  Mr. Vannessa Montgomery tolerated the procedure well with no complications. MRI LEFT KNEE 3/22/19 [facility]  Impression:  1. Surgical changes of prior ACL and MCL reconstruction with tendon grafts which  appear intact. 2. 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 3. Medial and lateral meniscal tears as discussed. 4.  Low-grade chondrosis in the lateral compartment. RADIOGRAPHS:  XR LEFT KNEE 3/11/19 [facility]  IMPRESSION:  Three views - No fractures, no effusion, mild joint space narrowing, + osteophytes present. Kellgren Kory grade 1 screws present, tunnels c/w ACL reconstruction    IMPRESSION:      ICD-10-CM ICD-9-CM    1. Primary osteoarthritis of left knee  M17.12 715.16 PROCEDURE AUTHORIZATION TO       betamethasone (CELESTONE) injection 3 mg      DRAIN/INJECT LARGE JOINT/BURSA   2. Chronic pain of left knee  M25.562 719.46 PROCEDURE AUTHORIZATION TO     G89.29 338.29 betamethasone (CELESTONE) injection 3 mg      DRAIN/INJECT LARGE JOINT/BURSA     PLAN:  Dietary counseling provided today. Start weight loss with low carb diet and intermittent fasting. Consider visco supplementation if pain continues. After discussing treatment options, patient's left knee was injected with 4 cc Marcaine and 1/2 cc Celestone. There is no need for surgery at this time. He will follow up as needed.      Scribed by Michael Ritter ([address]) as dictated by Rajani Hernandez MD

## 2021-08-24 ENCOUNTER — DOCUMENTATION ONLY (OUTPATIENT)
Dept: ORTHOPEDIC SURGERY | Age: 31
End: 2021-08-24

## 2021-09-17 ENCOUNTER — OFFICE VISIT (OUTPATIENT)
Dept: ORTHOPEDIC SURGERY | Age: 31
End: 2021-09-17
Payer: COMMERCIAL

## 2021-09-17 VITALS
OXYGEN SATURATION: 97 % | HEART RATE: 68 BPM | HEIGHT: 72 IN | TEMPERATURE: 96.9 F | WEIGHT: 270 LBS | BODY MASS INDEX: 36.57 KG/M2

## 2021-09-17 DIAGNOSIS — M17.32 POST-TRAUMATIC OSTEOARTHRITIS OF LEFT KNEE: ICD-10-CM

## 2021-09-17 DIAGNOSIS — G89.29 CHRONIC PAIN OF LEFT KNEE: Primary | ICD-10-CM

## 2021-09-17 DIAGNOSIS — M25.562 CHRONIC PAIN OF LEFT KNEE: Primary | ICD-10-CM

## 2021-09-17 PROCEDURE — 99213 OFFICE O/P EST LOW 20 MIN: CPT | Performed by: SPECIALIST

## 2021-09-17 PROCEDURE — 20610 DRAIN/INJ JOINT/BURSA W/O US: CPT | Performed by: SPECIALIST

## 2021-09-17 RX ORDER — HYALURONATE SOD, CROSS-LINKED 30 MG/3 ML
25 SYRINGE (ML) INTRAARTICULAR ONCE
Qty: 3 ML | Refills: 0
Start: 2021-09-17 | End: 2021-09-17

## 2021-09-17 RX ORDER — TRAMADOL HYDROCHLORIDE 50 MG/1
50 TABLET ORAL
COMMUNITY

## 2021-09-17 NOTE — PROGRESS NOTES
Patient: Chelo Torres. MRN: [id number]       SSN: [id number]  YOB: 1990        AGE: 36 y.o. SEX: male    PCP: Taylor Birmingham MD  09/17/21    Chief Complaint   Patient presents with    Knee Pain     left     HISTORY:  Chelo Chacon is a 36 y.o. male who is seen for left knee pain. He complains of stiffness and locking. He has occasional giving way episodes. He uses a cane when stair climbing. He has been experiencing knee pain for the past 2 years. He sustained a severe left knee injury in June 2017. He fell into a ditch while walking. He sustained an ACL tear, MCL tear, and a left femoral medial epicondylar avulsion fracture. He underwent left ACL repair in [place] a month after injury. He feels tenderness over his incision site. He has felt constant pain since his surgery. He notes intermittent left knee swelling. He notes pain with standing, walking, and stair climbing. He experiences startup pain after sitting. He was seen by Dr. Mitra Burns in May. He states that he did get along well Dr. Mitra Burns because he was rude according to Mr. Belen Raygoza. He completed a successful Euflexxa series on 10/10/19. He has tried a variety of conservative measures including cortisone injections, activity modification, bracing, NSAIDs and physical therapy. Pain Assessment  9/17/2021   Location of Pain Knee   Location Modifiers Left   Severity of Pain 5   Quality of Pain Aching;Cracking   Quality of Pain Comment -   Duration of Pain Persistent   Frequency of Pain Constant   Aggravating Factors Stairs; Walking;Standing;Squatting;Kneeling;Exercise;Straightening;Stretching;Bending   Aggravating Factors Comment -   Limiting Behavior Yes   Relieving Factors Elevation; Other (Comment)   Relieving Factors Comment rx pain medication   Result of Injury Yes   Work-Related Injury No   Type of Injury Fall     Occupation, etc:  Mr. Beeln Raygoza receives social security disability benefits for his left knee. 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 He moved to this area from [place] to be closer to his mother. He lives in [place] with his brother. He gained 30 pounds recently due to inactivity as a result of the Coronavirus pandemic. He has had trouble losing the weight. He is not able to exercise much due to his knee pain. He has been swimming for exercise lately. He is a  [facility] [address] fan. He is not diabetic or hypertensive. Mr. Leandra Coombs weighs 270 lbs and is 6'0\" tall. No results found for: HBA1C, NDQ8HFMJ, VPZ5STZA, ECH6IDCP  Weight Metrics 9/17/2021 8/20/2021 5/13/2021 2/19/2020 10/10/2019 10/3/2019 9/30/2019   Weight 270 lb 269 lb 6.4 oz 270 lb 262 lb 9.6 oz 263 lb 3.2 oz 262 lb 3.2 oz 263 lb   BMI 36.62 kg/m2 36.54 kg/m2 37.66 kg/m2 35.61 kg/m2 35.7 kg/m2 35.56 kg/m2 35.67 kg/m2       Patient Active Problem List   Diagnosis Code    Severe obesity ([address] E66.01     REVIEW OF SYSTEMS: All Below are Negative except: See HPI   Constitutional: negative for fever, chills, and weight loss. Cardiovascular: negative for chest pain, claudication, leg swelling, SOB, ROMANO   Gastrointestinal: Negative for pain, N/V/C/D, Blood in stool or urine, dysuria, hematuria, incontinence, pelvic pain. Musculoskeletal: See HPI   Neurological: Negative for dizziness and weakness. Negative for headaches, Visual changes, confusion, seizures   Phychiatric/Behavioral: Negative for depression, memory loss, substance abuse. Extremities: Negative for hair changes, rash, or skin lesion changes. Hematologic: Negative for bleeding problems, bruising, pallor or swollen lymph nodes   Peripheral Vascular: No calf pain, no circulation deficits.     Social History     Socioeconomic History    Marital status: SINGLE     Spouse name: Not on file    Number of children: Not on file    Years of education: Not on file    Highest education level: Not on file   Occupational History    Not on file   Tobacco Use    Smoking status: Never Smoker    Smokeless tobacco: Never Used   Substance and Sexual Activity    Alcohol use: Yes     Comment: social     Drug use: Never    Sexual activity: Yes     Partners: Male   Other Topics Concern    Not on file   Social History Narrative    Not on file     Social Determinants of Health     Financial Resource Strain:     Difficulty of Paying Living Expenses:    Food Insecurity:     Worried About Running Out of Food in the Last Year:     [address] in the Last Year:    Transportation Needs:     Lack of Transportation (Medical):  Lack of Transportation (Non-Medical):    Physical Activity:     Days of Exercise per Week:     Minutes of Exercise per Session:    Stress:     Feeling of Stress :    Social Connections:     Frequency of Communication with Friends and Family:     Frequency of Social Gatherings with Friends and Family:     Attends [employer] Services:     Active Member of Clubs or Organizations:     Attends Club or Organization Meetings:     Marital Status:    Intimate Partner Violence:     Fear of Current or Ex-Partner:     Emotionally Abused:     Physically Abused:     Sexually Abused:       No Known Allergies   Current Outpatient Medications   Medication Sig    traMADoL (ULTRAM) 50 mg tablet Take 50 mg by mouth every six (6) hours as needed for Pain.  pregabalin (LYRICA) 150 mg capsule     clonazePAM (KlonoPIN) 0.5 mg tablet     lisinopril-hydroCHLOROthiazide (PRINZIDE, ZESTORETIC) 10-12.5 mg per tablet TAKE 1 TABLET BY MOUTH EVERY DAY    lidocaine (LIDODERM) 5 % APPLY PATCH TO THE AFFECTED AREA FOR 12 HOURS A DAY AND REMOVE FOR 12 HOURS A DAY.  fluticasone propionate (FLONASE) 50 mcg/actuation nasal spray USE ONE SQUIRT IN EACH NOSTRIL DAILY    sertraline (ZOLOFT) 50 mg tablet Take  by mouth daily.     capsaicin (Zostrix) 0.033 % crea Apply to affected area twice a day  Indications: pain associated with arthritis (Patient not taking: Reported on 8/20/2021)    Blood Pressure Test Kit-Medium kit 1 Each by Does Not Apply route two (2) times a day. (Patient not taking: Reported on 8/20/2021)    gabapentin (NEURONTIN) 400 mg capsule Take 400 mg by mouth three (3) times daily. (Patient not taking: Reported on 8/20/2021)     No current facility-administered medications for this visit. PHYSICAL EXAMINATION:  Visit Vitals  Pulse 68   Temp 96.9 °F (36.1 °C) (Temporal)   Ht 6' (1.829 m)   Wt 270 lb (122.5 kg)   SpO2 97%   BMI 36.62 kg/m²      ORTHO EXAMINATION:  Examination Right knee Left knee   Skin Intact Intact, well healed curving anteromedial ACL reconstruction scar   Range of motion 120-0 110-0   Effusion - -   Medial joint line tenderness - +   Lateral joint line tenderness - -   Popliteal tenderness - -   Osteophytes palpable - +   Bens - -   Patella crepitus - +   Anterior drawer - 1+   Lateral laxity - -   Medial laxity - -   Varus deformity - -   Valgus deformity - -   Pretibial edema - -   Calf tenderness - -     Using single point cane    TIME OUT:  Chart reviewed for the following:   Chelsey Wilson MD, have reviewed the History, Physical and updated the Allergic reactions for [address] performed immediately prior to start of procedure:  Chelsey Wilson MD, have performed the following reviews on [place] [address]. prior to the start of the procedure:          * Patient was identified by name and date of birth   * Agreement on procedure being performed was verified  * Risks and Benefits explained to the patient  * Procedure site verified and marked as necessary  * Patient was positioned for comfort  * Consent was obtained     Time: 3:15 PM     Date of procedure: 9/17/2021  Procedure performed by:  Alyce Dicknes MD  Mr. Stephanie Reynolds tolerated the procedure well with no complications. MRI LEFT KNEE 3/22/19 [facility]  Impression:  1.  Surgical changes of prior ACL and MCL reconstruction with tendon grafts which appear intact. 2. 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 3. Medial and lateral meniscal tears as discussed. 4. Low-grade chondrosis in the lateral compartment. RADIOGRAPHS:  XR LEFT KNEE 3/11/19 [facility]  IMPRESSION:  Three views - No fractures, no effusion, mild joint space narrowing, + osteophytes present. Kellgren Kory grade 1 screws present, tunnels c/w ACL reconstruction    IMPRESSION:      ICD-10-CM ICD-9-CM    1. Chronic pain of left knee  M25.562 719.46 sodium hyaluronate (Visco-3) 10 mg/mL syrg injection    G89.29 338.29 [place] VISCO-3 INJ DOSE   2. Post-traumatic osteoarthritis of left knee  M17.32 715.26 sodium hyaluronate (Visco-3) 10 mg/mL syrg injection      [place] VISCO-3 INJ DOSE     PLAN:  After discussing treatment options, patient's left knee was injected with 2 cc Visco-3. There is no need for surgery at this time. She will follow up in 1 week for Visco-3 #2.      Scribed by Antonio Simmons ([facility]) as dictated by Marcia Daniels MD

## 2021-09-24 ENCOUNTER — OFFICE VISIT (OUTPATIENT)
Dept: ORTHOPEDIC SURGERY | Age: 31
End: 2021-09-24
Payer: COMMERCIAL

## 2021-09-24 VITALS
TEMPERATURE: 97.3 F | HEIGHT: 71 IN | HEART RATE: 87 BPM | BODY MASS INDEX: 37.8 KG/M2 | OXYGEN SATURATION: 99 % | WEIGHT: 270 LBS

## 2021-09-24 DIAGNOSIS — G89.29 CHRONIC PAIN OF LEFT KNEE: ICD-10-CM

## 2021-09-24 DIAGNOSIS — M17.12 PRIMARY OSTEOARTHRITIS OF LEFT KNEE: Primary | ICD-10-CM

## 2021-09-24 DIAGNOSIS — M25.562 CHRONIC PAIN OF LEFT KNEE: ICD-10-CM

## 2021-09-24 PROCEDURE — 20610 DRAIN/INJ JOINT/BURSA W/O US: CPT | Performed by: SPECIALIST

## 2021-09-24 RX ORDER — HYALURONATE SOD, CROSS-LINKED 30 MG/3 ML
25 SYRINGE (ML) INTRAARTICULAR ONCE
Qty: 3 ML | Refills: 0
Start: 2021-09-24 | End: 2021-09-24

## 2021-09-24 NOTE — PROGRESS NOTES
Patient: Dimitri Given. MRN: [id number]       SSN: [id number]  YOB: 1990        AGE: 36 y.o. SEX: male  Body mass index is 37.66 kg/m². PCP: Marvin Pak MD  09/24/21    Chief Complaint   Patient presents with    Knee Pain     2nd injection knee     HISTORY:  Dimitri Beltre. is a 36 y.o. male who is seen for left knee pain. ICD-10-CM ICD-9-CM    1. Primary osteoarthritis of left knee  M17.12 715.16 sodium hyaluronate (Visco-3) 10 mg/mL syrg injection      [place] VISCO-3 INJ DOSE   2. Chronic pain of left knee  M25.562 719.46 sodium hyaluronate (Visco-3) 10 mg/mL syrg injection    G89.29 338.29 [place] VISCO-3 INJ DOSE       Chart reviewed for the following:   I, Trent Gregorio MD, have reviewed the History, Physical and updated the Allergic reactions for [address] [facility] performed immediately prior to start of procedure:  Evette Crouch MD, have performed the following reviews on Clydene Given. prior to the start of the procedure:            * Patient was identified by name and date of birth   * Agreement on procedure being performed was verified  * Risks and Benefits explained to the patient  * Procedure site verified and marked as necessary  * Patient was positioned for comfort  * Consent was obtained     Time: 2:50 PM     Date of procedure: 9/24/2021    Procedure performed by:  Trent Gregorio MD    Mr. Juan Ugalde tolerated the procedure well with no complications. PLAN:  After discussing treatment options, patient's left knee was injected with 2 cc of Visco-3. Mr. Juan Ugalde will follow up in one week to complete his visco supplementation injection series.       Scribed by Kim Silva ([address]) as dictated by Trent Gregorio MD

## 2021-10-01 ENCOUNTER — OFFICE VISIT (OUTPATIENT)
Dept: ORTHOPEDIC SURGERY | Age: 31
End: 2021-10-01
Payer: COMMERCIAL

## 2021-10-01 VITALS
HEIGHT: 71 IN | OXYGEN SATURATION: 95 % | HEART RATE: 78 BPM | BODY MASS INDEX: 37.8 KG/M2 | TEMPERATURE: 97.7 F | WEIGHT: 270 LBS

## 2021-10-01 DIAGNOSIS — G89.29 CHRONIC PAIN OF LEFT KNEE: ICD-10-CM

## 2021-10-01 DIAGNOSIS — M25.562 CHRONIC PAIN OF LEFT KNEE: ICD-10-CM

## 2021-10-01 DIAGNOSIS — M17.12 PRIMARY OSTEOARTHRITIS OF LEFT KNEE: Primary | ICD-10-CM

## 2021-10-01 PROCEDURE — 20610 DRAIN/INJ JOINT/BURSA W/O US: CPT | Performed by: SPECIALIST

## 2021-10-01 RX ORDER — HYALURONATE SOD, CROSS-LINKED 30 MG/3 ML
25 SYRINGE (ML) INTRAARTICULAR ONCE
Qty: 3 ML | Refills: 0
Start: 2021-10-01 | End: 2021-10-01

## 2021-10-01 NOTE — PROGRESS NOTES
Patient: Gemma Tam. MRN: [id number]       SSN: [id number]  YOB: 1990        AGE: 36 y.o. SEX: male  Body mass index is 37.66 kg/m². PCP: Rufino Sharp MD  10/01/21    Chief Complaint   Patient presents with    Knee Pain     left knee 3rd injection     HISTORY:  Gemma Castro is a 36 y.o. male who is seen for left knee pain. TIME OUT performed immediately prior to start of procedure:  Felicia Coffman MD, have performed the following reviews on Gemma Castro prior to the start of the procedure:            * Patient was identified by name and date of birth   * Agreement on procedure being performed was verified  * Risks and Benefits explained to the patient  * Procedure site verified and marked as necessary  * Patient was positioned for comfort  * Consent was obtained     Time: 2:33 PM     Date of procedure: 10/1/2021    Procedure performed by:  Lala Greenberg MD    Mr. Lollie Nageotte tolerated the procedure well with no complications      KHH-89-HH ICD-9-CM    1. Primary osteoarthritis of left knee  M17.12 715.16 sodium hyaluronate (Visco-3) 10 mg/mL syrg injection      ME VISCO-3 INJ DOSE   2. Chronic pain of left knee  M25.562 719.46 sodium hyaluronate (Visco-3) 10 mg/mL syrg injection    G89.29 338.29 [place] VISCO-3 INJ DOSE     PLAN:  After discussing treatment options, patient's left knee was injected with 2 cc of Visco-3. Mr. Lollie Nageotte will follow up PRN now that he has completed his visco supplementation injection series.       Scribed by Micheal Ross ([address]) as dictated by Lala Greenberg MD

## 2021-10-26 DIAGNOSIS — J30.9 ALLERGIC RHINITIS, UNSPECIFIED SEASONALITY, UNSPECIFIED TRIGGER: ICD-10-CM

## 2021-10-26 RX ORDER — FLUTICASONE PROPIONATE 50 MCG
SPRAY, SUSPENSION (ML) NASAL
Qty: 1 EACH | Refills: 6 | Status: SHIPPED | OUTPATIENT
Start: 2021-10-26

## 2022-01-09 NOTE — PROGRESS NOTES
Consent: Lesli Euceda., who was seen by synchronous (real-time) audio-video technology, and/or his healthcare decision maker, is aware that this patient-initiated, Telehealth encounter on 1/10/2022 is a billable service, with coverage as determined by his insurance carrier. He is aware that he may receive a bill and has provided verbal consent to proceed: Yes. The patient was at home and I was at the offices of the [address] [place] [address] no one else participated in the service. ICD-10-CM ICD-9-CM    1. Elevated blood pressure reading in office without diagnosis of hypertension  R03.0 796.2 lisinopril-hydroCHLOROthiazide (PRINZIDE, ZESTORETIC) 20-25 mg per tablet      CBC WITH AUTOMATED DIFF      METABOLIC PANEL, COMPREHENSIVE   2. Left knee pain, unspecified chronicity  M25.562 719.46    3. Encounter for hepatitis C screening test for low risk patient  Z11.59 V73.89 HEPATITIS C AB   4. Encounter for immunization  Z23 V03.89 diphth,pertus,acell,,tetanus (BOOSTRIX TDAP) 2.5-8-5 Lf-mcg-Lf/0.5mL susp suspension       Assessment and plan  The patient has continued elevated blood pressure. We will increase the Prinzide to 20/25. Follow-up labs in 3 months. Continues to have pain in the left knee followed by orthopedics. Discussed the pros cons and alternatives to using a brace. Deferred to Ortho. Depression, improved. Chronic pain continues to follow-up with pain management. lab results and schedule of future lab studies reviewed with patient  The patient was seen on01/09/22. The issues addressed includedThere were no encounter diagnoses. .  Hypertension, poor control. Increase Prinzide to 20/25 daily. Patient is continue to follow-up with counseling for anxiety and pain management. No orders of the defined types were placed in this encounter.           Health Maintenance Due   Topic Date Due    Hepatitis C Screening  Never done    COVID-19 Vaccine (1) Never done    DTaP/Tdap/Td series (1 - Tdap) Never done    Flu Vaccine (1) Never done       Subjective:   Anitha Marquez is a 36 y.o. male who is being seen in follow-up. The patient has Severe obesity ([address] on their problem list..  The patient has the following problems. Hypertension, on Prinzide trial.Knee pain on Celebrex and following up with pain management. Anxiety with depression being followed by a counselor. COVID-19 infection  COVID caught end of December. 2020. The patient states he felt weak had chills and loss of taste. His mother also carotid possibly from him. Both of them have recovered. Right knee pain  The patient has continued pain in the knee laterally. Still getting injections. It is aggravated by movement. The patient is now in pain management and there are plans for an injection in the area to help with the pain. Anxiety   The patient has ongoing anxiety and is being followed by a counselor whose last name is Nahed. He states that he is improving with no SI or HI. Hypertension  The patient has had no problem with the medication. The patient has no headaches, visual changes, chest pain or pressure,dyspnea, orthopnea, abdominal pain, dysuria, weakness, or paresthesias. He has no lower readings. He states that the blood pressure is still slightly elevated. BP Readings from Last 3 Encounters:   01/10/22 (!) 149/88   02/19/20 (!) 142/94   10/10/19 (!) 138/97      Key CAD CHF Meds             lisinopril-hydroCHLOROthiazide (PRINZIDE, ZESTORETIC) 10-12.5 mg per tablet TAKE 1 TABLET BY MOUTH EVERY DAY        Overweight  The patient states that the weight has increased slightly. Patient's diet is unrestricted. The patient denies edema or ascites. Obesity comorbid conditions include high blood pressure   body mass index is 38.63 kg/m².   Wt Readings from Last 3 Encounters:   01/10/22 277 lb (125.6 kg)   10/01/21 270 lb (122.5 kg)   09/24/21 270 lb (122.5 kg)       Review of Systems   Constitutional: Negative for chills, fever and malaise/fatigue. HENT: Negative for congestion and sore throat. Eyes: Negative for blurred vision and redness. Respiratory: Negative for cough, shortness of breath and wheezing. Cardiovascular: Negative for chest pain and leg swelling. Gastrointestinal: Negative for abdominal pain, blood in stool, constipation, diarrhea and heartburn. Genitourinary: Negative for dysuria and urgency. Musculoskeletal: Positive for joint pain (Left knee pain). Negative for myalgias. Neurological: Negative for dizziness, sensory change, speech change and focal weakness. Endo/Heme/Allergies: Does not bruise/bleed easily. Psychiatric/Behavioral: Positive for depression. The patient is nervous/anxious. Health Maintenance Due   Topic Date Due    Hepatitis C Screening  Never done    COVID-19 Vaccine (1) Never done    DTaP/Tdap/Td series (1 - Tdap) Never done    Flu Vaccine (1) Never done         Current Outpatient Medications   Medication Sig    fluticasone propionate (FLONASE) 50 mcg/actuation nasal spray USE ONE SQUIRT IN EACH NOSTRIL DAILY    traMADoL (ULTRAM) 50 mg tablet Take 50 mg by mouth every six (6) hours as needed for Pain.  pregabalin (LYRICA) 150 mg capsule     clonazePAM (KlonoPIN) 0.5 mg tablet     lisinopril-hydroCHLOROthiazide (PRINZIDE, ZESTORETIC) 10-12.5 mg per tablet TAKE 1 TABLET BY MOUTH EVERY DAY    lidocaine (LIDODERM) 5 % APPLY PATCH TO THE AFFECTED AREA FOR 12 HOURS A DAY AND REMOVE FOR 12 HOURS A DAY.  capsaicin (Zostrix) 0.033 % crea Apply to affected area twice a day  Indications: pain associated with arthritis (Patient not taking: Reported on 8/20/2021)    Blood Pressure Test Kit-Medium kit 1 Each by Does Not Apply route two (2) times a day. (Patient not taking: Reported on 8/20/2021)    gabapentin (NEURONTIN) 400 mg capsule Take 400 mg by mouth three (3) times daily.  (Patient not taking: Reported on 8/20/2021)    sertraline (ZOLOFT) 50 mg tablet Take  by mouth daily. No current facility-administered medications for this visit. No Known Allergies  has Severe obesity (HCC) on their problem list.  Past Surgical History:   Procedure Laterality Date    HX ACL RECONSTRUCTION      HX ORTHOPAEDIC      L knee surgery        Visit Vitals  BP (!) 149/88 (BP 1 Location: Left arm, BP Patient Position: Sitting, BP Cuff Size: Large adult)   Pulse 96   Temp (!) 95.5 °F (35.3 °C) (Temporal)   Resp 18   Ht 5' 11\" (1.803 m)   Wt 277 lb (125.6 kg)   SpO2 96%   BMI 38.63 kg/m²       Physical Exam  Constitutional:       General: He is not in acute distress. HENT:      Right Ear: External ear normal.      Left Ear: External ear normal.      Nose: Nose normal.      Mouth/Throat:      Mouth: Mucous membranes are moist.      Pharynx: Oropharynx is clear. Eyes:      General: No scleral icterus. Extraocular Movements: Extraocular movements intact. Pupils: Pupils are equal, round, and reactive to light. Pulmonary:      Effort: Pulmonary effort is normal.   Musculoskeletal:         General: No swelling. Right lower leg: No edema. Left lower leg: No edema. Comments: Well-healed lateral knee scar. Flexion is to 90 degrees. No edema or swelling locally noted. No redness noted   Skin:     Coloration: Skin is not jaundiced. Findings: No erythema. Neurological:      Mental Status: He is alert and oriented to person, place, and time. Coordination: Coordination normal.      Gait: Gait normal.   Psychiatric:         Mood and Affect: Mood normal.         Behavior: Behavior normal.        We discussed the expected course, resolution and complications of the diagnosis(es) in detail. Medication risks, benefits, costs, interactions, and alternatives were discussed as indicated. I advised him to contact the office if his condition worsens, changes or fails to improve as anticipated. He expressed understanding with the diagnosis(es) and plan. Maynor Wood is a 36 y.o. male being evaluated by a video visit encounter for concerns as above. A caregiver was present when appropriate. Due to this being a TeleHealth encounter ([facility]), evaluation of the following organ systems was limited: Vitals/Constitutional/EENT/Resp/CV/GI//MS/Neuro/Skin/Heme-Lymph-Imm. Pursuant to the emergency declaration under the [facility], [facility] waiver authority and the [employer] Act, this Virtual  Visit was conducted, with patient's (and/or legal guardian's) consent, to reduce the patient's risk of exposure to COVID-19 and provide necessary medical care. This note was done with the assistance of dragon speech software.   Some inadvertent errors or omissions may be present

## 2022-01-10 ENCOUNTER — OFFICE VISIT (OUTPATIENT)
Dept: FAMILY MEDICINE CLINIC | Age: 32
End: 2022-01-10
Payer: COMMERCIAL

## 2022-01-10 VITALS
TEMPERATURE: 95.5 F | WEIGHT: 277 LBS | SYSTOLIC BLOOD PRESSURE: 149 MMHG | BODY MASS INDEX: 38.78 KG/M2 | DIASTOLIC BLOOD PRESSURE: 88 MMHG | OXYGEN SATURATION: 96 % | HEIGHT: 71 IN | HEART RATE: 96 BPM | RESPIRATION RATE: 18 BRPM

## 2022-01-10 DIAGNOSIS — Z11.59 ENCOUNTER FOR HEPATITIS C SCREENING TEST FOR LOW RISK PATIENT: ICD-10-CM

## 2022-01-10 DIAGNOSIS — R03.0 ELEVATED BLOOD PRESSURE READING IN OFFICE WITHOUT DIAGNOSIS OF HYPERTENSION: Primary | ICD-10-CM

## 2022-01-10 DIAGNOSIS — M25.562 LEFT KNEE PAIN, UNSPECIFIED CHRONICITY: ICD-10-CM

## 2022-01-10 DIAGNOSIS — Z23 ENCOUNTER FOR IMMUNIZATION: ICD-10-CM

## 2022-01-10 PROCEDURE — 99213 OFFICE O/P EST LOW 20 MIN: CPT | Performed by: EMERGENCY MEDICINE

## 2022-01-10 RX ORDER — LISINOPRIL AND HYDROCHLOROTHIAZIDE 20; 25 MG/1; MG/1
1 TABLET ORAL DAILY
Qty: 90 TABLET | Refills: 3 | Status: SHIPPED | OUTPATIENT
Start: 2022-01-10

## 2022-01-10 NOTE — PROGRESS NOTES
Glennis Dance. is a 36 y.o. male that is here for a   Chief Complaint   Patient presents with    Follow Up Chronic Condition     HTN         1. Have you been to the ER, urgent care clinic since your last visit? Hospitalized since your last visit?no    2. Have you seen or consulted any other health care providers outside of the [address] [place] [address] since your last visit? Include any pap smears or colon screening.  no      Health Maintenance reviewed - yes      Upcoming Appts  no      VORB: No orders of the defined types were placed in this encounter.   Ruddy Betts MD/ Janice Pak MA

## 2022-01-10 NOTE — PATIENT INSTRUCTIONS
Learning About High Blood Pressure  What is high blood pressure? Blood pressure is a measure of how hard the blood pushes against the walls of your arteries. It's normal for blood pressure to go up and down throughout the day. But if it stays up, you have high blood pressure. Another name for high blood pressure is hypertension. Two numbers tell you your blood pressure. The first number is the systolic pressure (top number). It shows how hard the blood pushes when your heart is pumping. The second number is the diastolic pressure (bottom number). It shows how hard the blood pushes between heartbeats, when your heart is relaxed and filling with blood. Your doctor will give you a goal for your blood pressure based on your health and your age. High blood pressure (hypertension) means that the top number stays high, or the bottom number stays high, or both. High blood pressure increases the risk of stroke, heart attack, and other problems. What happens when you have high blood pressure? · Blood flows through your arteries with too much force. Over time, this can damage the heart and the walls of your arteries. But you can't feel it. High blood pressure usually doesn't cause symptoms. · High blood pressure makes your heart work harder. And that can lead to heart failure, which means your heart doesn't pump as much blood as your body needs. · Fat and calcium start to build up in your arteries. This buildup is called hardening of the arteries. It can cause many problems including a heart attack and stroke. · Arteries also carry blood and oxygen to organs like your eyes, kidneys, and brain. If high blood pressure damages those arteries, it can lead to vision loss, kidney disease, stroke, and a higher risk of dementia. How can you prevent high blood pressure? · Stay at a healthy weight. · Try to limit how much sodium you eat to less than 2,300 milligrams (mg) a day.  If you limit your sodium to 1,500 mg a day, you can lower your blood pressure even more. ? Buy foods that are labeled \"unsalted,\" \"sodium-free,\" or \"low-sodium. \" Foods labeled \"reduced-sodium\" and \"light sodium\" may still have too much sodium. ? Flavor your food with garlic, lemon juice, onion, vinegar, herbs, and spices instead of salt. Do not use soy sauce, steak sauce, onion salt, garlic salt, mustard, or ketchup on your food. ? Use less salt (or none) when recipes call for it. You can often use half the salt a recipe calls for without losing flavor. · Be physically active. Get at least 30 minutes of exercise on most days of the week. Walking is a good choice. You also may want to do other activities, such as running, swimming, cycling, or playing tennis or team sports. · Limit alcohol to 2 drinks a day for men and 1 drink a day for women. · Eat plenty of fruits, vegetables, and low-fat dairy products. Eat less saturated and total fats. How is high blood pressure treated? · Your doctor will suggest making lifestyle changes to help your heart. For example, your doctor may ask you to eat healthy foods, quit smoking, lose extra weight, and be more active. · If lifestyle changes don't help enough, your doctor may recommend that you take medicine. · When blood pressure is very high, medicines are needed to lower it. Follow-up care is a key part of your treatment and safety. Be sure to make and go to all appointments, and call your doctor if you are having problems. It's also a good idea to know your test results and keep a list of the medicines you take. Where can you learn more? Go to http://www.EBDSoft.com/  Enter P501 in the search box to learn more about \"Learning About High Blood Pressure. \"  Current as of: April 29, 2021               Content Version: 13.0  © 1068-1144 Healthwise, Incorporated.    Care instructions adapted under license by [employer] (which disclaims liability or warranty for this information). If you have questions about a medical condition or this instruction, always ask your healthcare professional. [address] any warranty or liability for your use of this information. Learning About the [address] Diet  What is the Mediterranean diet? The Mediterranean diet is a style of eating rather than a diet plan. It features foods eaten in [place], [place], [place] and [place], and other countries along the [facility]. It emphasizes eating foods like fish, fruits, vegetables, beans, high-fiber breads and whole grains, nuts, and olive oil. This style of eating includes limited red meat, cheese, and sweets. Why choose the Mediterranean diet? A Mediterranean-style diet may improve heart health. It contains more fat than other heart-healthy diets. But the fats are mainly from nuts, unsaturated oils (such as fish oils and olive oil), and certain nut or seed oils (such as canola, soybean, or flaxseed oil). These fats may help protect the heart and blood vessels. How can you get started on the Mediterranean diet? Here are some things you can do to switch to a more Mediterranean way of eating. What to eat  · Eat a variety of fruits and vegetables each day, such as grapes, blueberries, tomatoes, broccoli, peppers, figs, olives, spinach, eggplant, beans, lentils, and chickpeas. · Eat a variety of whole-grain foods each day, such as oats, brown rice, and whole wheat bread, pasta, and couscous. · Eat fish at least 2 times a week. Try tuna, salmon, mackerel, lake trout, herring, or sardines. · Eat moderate amounts of low-fat dairy products, such as milk, cheese, or yogurt. · Eat moderate amounts of poultry and eggs. · Choose healthy (unsaturated) fats, such as nuts, olive oil, and certain nut or seed oils like canola, soybean, and flaxseed. · Limit unhealthy (saturated) fats, such as butter, palm oil, and coconut oil.  And limit fats found in animal products, such as meat and dairy products made with whole milk. Try to eat red meat only a few times a month in very small amounts. · Limit sweets and desserts to only a few times a week. This includes sugar-sweetened drinks like soda. The Mediterranean diet may also include red wine with your meal--1 glass each day for women and up to 2 glasses a day for men. Tips for eating at home  · Use herbs, spices, garlic, lemon zest, and citrus juice instead of salt to add flavor to foods. · Add avocado slices to your sandwich instead of hernandez. · Have fish for lunch or dinner instead of red meat. Brush the fish with olive oil, and broil or grill it. · Sprinkle your salad with seeds or nuts instead of cheese. · Cook with olive or canola oil instead of butter or oils that are high in saturated fat. · Switch from 2% milk or whole milk to 1% or fat-free milk. · Dip raw vegetables in a vinaigrette dressing or hummus instead of dips made from mayonnaise or sour cream.  · Have a piece of fruit for dessert instead of a piece of cake. Try baked apples, or have some dried fruit. Tips for eating out  · Try broiled, grilled, baked, or poached fish instead of having it fried or breaded. · Ask your  to have your meals prepared with olive oil instead of butter. · Order dishes made with marinara sauce or sauces made from olive oil. Avoid sauces made from cream or mayonnaise. · Choose whole-grain breads, whole wheat pasta and pizza crust, brown rice, beans, and lentils. · Cut back on butter or margarine on bread. Instead, you can dip your bread in a small amount of olive oil. · Ask for a side salad or grilled vegetables instead of french fries or chips. Where can you learn more? Go to http://www.[employer].com/  Enter O407 in the search box to learn more about \"Learning About the Mediterranean Diet. \"  Current as of: December 17, 2020               Content Version: 13.0  © 1892-8829 Healthwise, Incorporated. Care instructions adapted under license by [employer] (which disclaims liability or warranty for this information). If you have questions about a medical condition or this instruction, always ask your healthcare professional. [address] any warranty or liability for your use of this information. Knee Pain or Injury: Care Instructions  Your Care Instructions     Injuries are a common cause of knee problems. Sudden (acute) injuries may be caused by a direct blow to the knee. They can also be caused by abnormal twisting, bending, or falling on the knee. Pain, bruising, or swelling may be severe, and may start within minutes of the injury. Overuse is another cause of knee pain. Other causes are climbing stairs, kneeling, and other activities that use the knee. Everyday wear and tear, especially as you get older, also can cause knee pain. Rest, along with home treatment, often relieves pain and allows your knee to heal. If you have a serious knee injury, you may need tests and treatment. Follow-up care is a key part of your treatment and safety. Be sure to make and go to all appointments, and call your doctor if you are having problems. It's also a good idea to know your test results and keep a list of the medicines you take. How can you care for yourself at home? · Be safe with medicines. Read and follow all instructions on the label. ? If the doctor gave you a prescription medicine for pain, take it as prescribed. ? If you are not taking a prescription pain medicine, ask your doctor if you can take an over-the-counter medicine. · Rest and protect your knee. Take a break from any activity that may cause pain. · Put ice or a cold pack on your knee for 10 to 20 minutes at a time. Put a thin cloth between the ice and your skin. · Prop up a sore knee on a pillow when you ice it or anytime you sit or lie down for the next 3 days.  Try to keep it above the level of your heart. This will help reduce swelling. · If your knee is not swollen, you can put moist heat, a heating pad, or a warm cloth on your knee. · If your doctor recommends an elastic bandage, sleeve, or other type of support for your knee, wear it as directed. · Follow your doctor's instructions about how much weight you can put on your leg. Use a cane, crutches, or a walker as instructed. · Follow your doctor's instructions about activity during your healing process. If you can do mild exercise, slowly increase your activity. · Reach and stay at a healthy weight. Extra weight can strain the joints, especially the knees and hips, and make the pain worse. Losing even a few pounds may help. When should you call for help? Call 911 anytime you think you may need emergency care. For example, call if:    · You have symptoms of a blood clot in your lung (called a pulmonary embolism). These may include:  ? Sudden chest pain. ? Trouble breathing. ? Coughing up blood. Call your doctor now or seek immediate medical care if:    · You have severe or increasing pain.     · Your leg or foot turns cold or changes color.     · You cannot stand or put weight on your knee.     · Your knee looks twisted or bent out of shape.     · You cannot move your knee.     · You have signs of infection, such as:  ? Increased pain, swelling, warmth, or redness. ? Red streaks leading from the knee. ? Pus draining from a place on your knee. ? A fever.     · You have signs of a blood clot in your leg (called a deep vein thrombosis), such as:  ? Pain in your calf, back of the knee, thigh, or groin. ? Redness and swelling in your leg or groin.    Watch closely for changes in your health, and be sure to contact your doctor if:    · You have tingling, weakness, or numbness in your knee.     · You have any new symptoms, such as swelling.     · You have bruises from a knee injury that last longer than 2 weeks.     · You do not get better as expected. Where can you learn more? Go to http://www.gray.com/  Enter K195 in the search box to learn more about \"Knee Pain or Injury: Care Instructions. \"  Current as of: July 1, 2021               Content Version: 13.0  © 1201-1993 Healthwise, Incorporated. Care instructions adapted under license by [employer] (which disclaims liability or warranty for this information). If you have questions about a medical condition or this instruction, always ask your healthcare professional. [address] any warranty or liability for your use of this information. Hydroxyzine Counseling: Patient advised that the medication is sedating and not to drive a car after taking this medication.  Patient informed of potential adverse effects including but not limited to dry mouth, urinary retention, and blurry vision.  The patient verbalized understanding of the proper use and possible adverse effects of hydroxyzine.  All of the patient's questions and concerns were addressed.

## 2022-03-19 PROBLEM — E66.01 SEVERE OBESITY (HCC): Status: ACTIVE | Noted: 2019-09-23

## 2022-03-28 ENCOUNTER — OFFICE VISIT (OUTPATIENT)
Dept: FAMILY MEDICINE CLINIC | Age: 32
End: 2022-03-28
Payer: COMMERCIAL

## 2022-03-28 VITALS
RESPIRATION RATE: 16 BRPM | OXYGEN SATURATION: 95 % | DIASTOLIC BLOOD PRESSURE: 91 MMHG | HEIGHT: 71 IN | WEIGHT: 267 LBS | SYSTOLIC BLOOD PRESSURE: 136 MMHG | TEMPERATURE: 97.3 F | BODY MASS INDEX: 37.38 KG/M2 | HEART RATE: 77 BPM

## 2022-03-28 DIAGNOSIS — R03.0 ELEVATED BLOOD PRESSURE READING IN OFFICE WITHOUT DIAGNOSIS OF HYPERTENSION: Primary | ICD-10-CM

## 2022-03-28 DIAGNOSIS — F41.8 ANXIETY WITH DEPRESSION: ICD-10-CM

## 2022-03-28 DIAGNOSIS — Z11.59 ENCOUNTER FOR HEPATITIS C SCREENING TEST FOR LOW RISK PATIENT: ICD-10-CM

## 2022-03-28 DIAGNOSIS — R05.9 COUGH: ICD-10-CM

## 2022-03-28 DIAGNOSIS — Z13.220 SCREENING FOR CHOLESTEROL LEVEL: ICD-10-CM

## 2022-03-28 DIAGNOSIS — E66.01 SEVERE OBESITY (HCC): ICD-10-CM

## 2022-03-28 DIAGNOSIS — Z23 ENCOUNTER FOR IMMUNIZATION: ICD-10-CM

## 2022-03-28 PROCEDURE — 99214 OFFICE O/P EST MOD 30 MIN: CPT | Performed by: EMERGENCY MEDICINE

## 2022-03-28 RX ORDER — BENZONATATE 100 MG/1
100 CAPSULE ORAL
Qty: 30 CAPSULE | Refills: 0 | Status: SHIPPED | OUTPATIENT
Start: 2022-03-28 | End: 2022-04-17

## 2022-03-28 NOTE — PROGRESS NOTES
ICD-10-CM ICD-9-CM    1. Elevated blood pressure reading in office without diagnosis of hypertension  R03.0 796.2 CBC WITH AUTOMATED DIFF      METABOLIC PANEL, BASIC   2. Anxiety with depression  F41.8 300.4    3. Screening for cholesterol level  Z13.220 V77.91 LIPID PANEL   4. Encounter for immunization  Z23 V03.89    5. Encounter for hepatitis C screening test for low risk patient  Z11.59 V73.89 HEPATITIS C AB   6. Cough  R05.9 786.2 benzonatate (TESSALON) 100 mg capsule   7. Severe obesity ([address]  E66.01 278.01      Follow-up and Dispositions    · Return in about 4 months (around 7/28/2022) for Draw labs/diagnostics 1 week prior to next visit, Follow up on illness. Assessment and plan  Essential hypertension, good control continue present regimen and follow-up labs before next visit. Screen for hep C  Left knee pain. Followed by pain management. Continue along and follow along with them. Anxiety followed by a counselor. Doing well. Follow-up for  Lab results and schedule of future lab studies reviewed with patient  Diagnostic and radiologic results and the schedule of future studies were reviewed with the patient  Reviewed diet, exercise and weight control  All questions answered and understood. Subjective:   Brandon Botello is a 29 y.o. male who is being seen in follow-up. The patient has Severe obesity ([address] on their problem list..   Seen previously by our practice on 1/10/2022. The patient has continued elevated blood pressure. We will increase the Prinzide to 20/25. Follow-up labs in 3 months. Continues to have pain in the left knee followed by orthopedics. Discussed the pros cons and alternatives to using a brace. Deferred to Ortho. Depression, improved. Chronic pain continues to follow-up with pain management. The patient has the following problems. Hypertension, on Prinzide trial.Knee pain on Celebrex and following up with pain management. Anxiety with depression being followed by a counselor. Cough  1 month history of cough. Primarily at night. Nonproductive. No fevers chills or sweats are admitted to. [facility]. At this time he is not taking anything for relief. No history of asthma. Non-smoker. COVID-19 infection  COVID caught end of December. 2020. The patient states he felt weak had chills and loss of taste. His mother also caught it possibly from him. Both of them have recovered. Right knee pain  The patient has continued pain in the knee laterally. Still getting injections , last one 5 monthe. .  It is aggravated by movement. The patient is now in pain management and there are plans for an injection in the area to help with the pain. Still seeing pain management. Next visit today. Anxiety   The patient has ongoing anxiety and is being followed by a counselor whose last name is Nahed. He states that he is improving with no SI or HI. Hypertension  The patient has had no problem with the medication. The patient has no headaches, visual changes, chest pain or pressure,dyspnea, orthopnea, abdominal pain, dysuria, weakness, or paresthesias. He has no lower readings. He states that the blood pressure is still slightly elevated. BP Readings from Last 3 Encounters:   03/28/22 (!) 136/91   01/10/22 (!) 149/88   02/19/20 (!) 142/94      Key CAD CHF Meds             lisinopril-hydroCHLOROthiazide (PRINZIDE, ZESTORETIC) 20-25 mg per tablet (Taking) Take 1 Tablet by mouth daily. Overweight  The patient states that the weight has increased slightly. Patient's diet is unrestricted. The patient denies edema or ascites. Obesity comorbid conditions include high blood pressure     Wt Readings from Last 3 Encounters:   03/28/22 267 lb (121.1 kg)   01/10/22 277 lb (125.6 kg)   10/01/21 270 lb (122.5 kg)       Review of Systems   Constitutional: Negative for chills, fever and malaise/fatigue.    HENT: Negative for congestion and sore throat. Eyes: Negative for blurred vision and redness. Respiratory: Negative for cough, shortness of breath and wheezing. Cardiovascular: Negative for chest pain and leg swelling. Gastrointestinal: Negative for abdominal pain, blood in stool, constipation, diarrhea and heartburn. Genitourinary: Negative for dysuria and urgency. Musculoskeletal: Positive for joint pain (Left knee pain). Negative for myalgias. Neurological: Negative for dizziness, sensory change, speech change and focal weakness. Endo/Heme/Allergies: Does not bruise/bleed easily. Psychiatric/Behavioral: Positive for depression. The patient is nervous/anxious. Health Maintenance Due   Topic Date Due    Hepatitis C Screening  Never done    COVID-19 Vaccine (1) Never done         Current Outpatient Medications   Medication Sig    benzonatate (TESSALON) 100 mg capsule Take 1 Capsule by mouth three (3) times daily as needed for Cough for up to 20 days.  lisinopril-hydroCHLOROthiazide (PRINZIDE, ZESTORETIC) 20-25 mg per tablet Take 1 Tablet by mouth daily.  fluticasone propionate (FLONASE) 50 mcg/actuation nasal spray USE ONE SQUIRT IN EACH NOSTRIL DAILY    traMADoL (ULTRAM) 50 mg tablet Take 50 mg by mouth every six (6) hours as needed for Pain.  pregabalin (LYRICA) 150 mg capsule     clonazePAM (KlonoPIN) 0.5 mg tablet     Blood Pressure Test Kit-Medium kit 1 Each by Does Not Apply route two (2) times a day.  sertraline (ZOLOFT) 50 mg tablet Take  by mouth daily. No current facility-administered medications for this visit.      No Known Allergies  has Severe obesity ([facility]) on their problem list.  Past Surgical History:   Procedure Laterality Date    HX ACL RECONSTRUCTION      HX ORTHOPAEDIC      L knee surgery        Visit Vitals  BP (!) 136/91 (BP 1 Location: Left arm, BP Patient Position: Sitting, BP Cuff Size: Large adult)   Pulse 77   Temp 97.3 °F (36.3 °C) (Temporal)   Resp 16   Ht 5' 11\" (1.803 m)   Wt 267 lb (121.1 kg)   SpO2 95%   BMI 37.24 kg/m²       Physical Exam  Constitutional:       General: He is not in acute distress. HENT:      Right Ear: External ear normal.      Left Ear: External ear normal.      Nose: Nose normal.      Mouth/Throat:      Mouth: Mucous membranes are moist.      Pharynx: Oropharynx is clear. Eyes:      General: No scleral icterus. Extraocular Movements: Extraocular movements intact. Pupils: Pupils are equal, round, and reactive to light. Pulmonary:      Effort: Pulmonary effort is normal.   Musculoskeletal:         General: No swelling. Right lower leg: No edema. Left lower leg: No edema. Comments: Well-healed lateral knee scar. Flexion is to 90 degrees. No edema or swelling locally noted. No redness noted   Skin:     Coloration: Skin is not jaundiced. Findings: No erythema. Neurological:      Mental Status: He is alert and oriented to person, place, and time. Coordination: Coordination normal.      Gait: Gait normal.   Psychiatric:         Mood and Affect: Mood normal.         Behavior: Behavior normal.        We discussed the expected course, resolution and complications of the diagnosis(es) in detail. Medication risks, benefits, costs, interactions, and alternatives were discussed as indicated. I advised him to contact the office if his condition worsens, changes or fails to improve as anticipated. He expressed understanding with the diagnosis(es) and plan. This note was done with the assistance of dragon speech software.   Some inadvertent errors or omissions may be present

## 2022-03-28 NOTE — PROGRESS NOTES
Sondra Stephens. is a 29 y.o. male that is here for a   Chief Complaint   Patient presents with    Follow Up Chronic Condition     HTN         1. Have you been to the ER, urgent care clinic since your last visit? Hospitalized since your last visit? Yes [facility]  03/10/2022    2. Have you seen or consulted any other health care providers outside of the [address] [place] [address] since your last visit? Include any pap smears or colon screening.  no      Health Maintenance reviewed yes      Upcoming Appts  no      VORB: No orders of the defined types were placed in this encounter.   Robby Banuelos MD/ Cole Mix MA

## 2022-03-28 NOTE — PATIENT INSTRUCTIONS
Learning About High Blood Pressure  What is high blood pressure? Blood pressure is a measure of how hard the blood pushes against the walls of your arteries. It's normal for blood pressure to go up and down throughout the day. But if it stays up, you have high blood pressure. Another name for high blood pressure is hypertension. Two numbers tell you your blood pressure. The first number is the systolic pressure (top number). It shows how hard the blood pushes when your heart is pumping. The second number is the diastolic pressure (bottom number). It shows how hard the blood pushes between heartbeats, when your heart is relaxed and filling with blood. Your doctor will give you a goal for your blood pressure based on your health and your age. High blood pressure (hypertension) means that the top number stays high, or the bottom number stays high, or both. High blood pressure increases the risk of stroke, heart attack, and other problems. What happens when you have high blood pressure? · Blood flows through your arteries with too much force. Over time, this can damage the heart and the walls of your arteries. But you can't feel it. High blood pressure usually doesn't cause symptoms. · High blood pressure makes your heart work harder. And that can lead to heart failure, which means your heart doesn't pump as much blood as your body needs. · Fat and calcium start to build up in your arteries. This buildup is called hardening of the arteries. It can cause many problems including a heart attack and stroke. · Arteries also carry blood and oxygen to organs like your eyes, kidneys, and brain. If high blood pressure damages those arteries, it can lead to vision loss, kidney disease, stroke, and a higher risk of dementia. How can you prevent high blood pressure? · Stay at a healthy weight. · Try to limit how much sodium you eat to less than 2,300 milligrams (mg) a day.  If you limit your sodium to 1,500 mg a day, you can lower your blood pressure even more. ? Buy foods that are labeled \"unsalted,\" \"sodium-free,\" or \"low-sodium. \" Foods labeled \"reduced-sodium\" and \"light sodium\" may still have too much sodium. ? Flavor your food with garlic, lemon juice, onion, vinegar, herbs, and spices instead of salt. Do not use soy sauce, steak sauce, onion salt, garlic salt, mustard, or ketchup on your food. ? Use less salt (or none) when recipes call for it. You can often use half the salt a recipe calls for without losing flavor. · Be physically active. Get at least 30 minutes of exercise on most days of the week. Walking is a good choice. You also may want to do other activities, such as running, swimming, cycling, or playing tennis or team sports. · Limit alcohol to 2 drinks a day for men and 1 drink a day for women. · Eat plenty of fruits, vegetables, and low-fat dairy products. Eat less saturated and total fats. How is high blood pressure treated? · Your doctor will suggest making lifestyle changes to help your heart. For example, your doctor may ask you to eat healthy foods, quit smoking, lose extra weight, and be more active. · If lifestyle changes don't help enough, your doctor may recommend that you take medicine. · When blood pressure is very high, medicines are needed to lower it. Follow-up care is a key part of your treatment and safety. Be sure to make and go to all appointments, and call your doctor if you are having problems. It's also a good idea to know your test results and keep a list of the medicines you take. Where can you learn more? Go to http://www.[address].com/  Enter P501 in the search box to learn more about \"Learning About High Blood Pressure. \"  Current as of: January 10, 2022               Content Version: 13.2  © 3081-4122 Health[employer], Incorporated.    Care instructions adapted under license by [employer] (which disclaims liability or warranty for this information). If you have questions about a medical condition or this instruction, always ask your healthcare professional. [address] any warranty or liability for your use of this information. Anxiety Disorder: Care Instructions  Your Care Instructions     Anxiety is a normal reaction to stress. Difficult situations can cause you to have symptoms such as sweaty palms and a nervous feeling. In an anxiety disorder, the symptoms are far more severe. Constant worry, muscle tension, trouble sleeping, nausea and diarrhea, and other symptoms can make normal daily activities difficult or impossible. These symptoms may occur for no reason, and they can affect your work, school, or social life. Medicines, counseling, and self-care can all help. Follow-up care is a key part of your treatment and safety. Be sure to make and go to all appointments, and call your doctor if you are having problems. It's also a good idea to know your test results and keep a list of the medicines you take. How can you care for yourself at home? · Take medicines exactly as directed. Call your doctor if you think you are having a problem with your medicine. · Go to your counseling sessions and follow-up appointments. · Recognize and accept your anxiety. Then, when you are in a situation that makes you anxious, say to yourself, \"This is not an emergency. I feel uncomfortable, but I am not in danger. I can keep going even if I feel anxious. \"  · Be kind to your body:  ? Relieve tension with exercise or a massage. ? Get enough rest.  ? Avoid alcohol, caffeine, nicotine, and illegal drugs. They can increase your anxiety level and cause sleep problems. ? Learn and do relaxation techniques. See below for more about these techniques. · Engage your mind. Get out and do something you enjoy. Go to a funny movie, or take a walk or hike. Plan your day. Having too much or too little to do can make you anxious.   · Keep a record of your symptoms. Discuss your fears with a good friend or family member, or join a support group for people with similar problems. Talking to others sometimes relieves stress. · Get involved in social groups, or volunteer to help others. Being alone sometimes makes things seem worse than they are. · Get at least 30 minutes of exercise on most days of the week to relieve stress. Walking is a good choice. You also may want to do other activities, such as running, swimming, cycling, or playing tennis or team sports. Relaxation techniques  Do relaxation exercises 10 to 20 minutes a day. You can play soothing, relaxing music while you do them, if you wish. · Tell others in your house that you are going to do your relaxation exercises. Ask them not to disturb you. · Find a comfortable place, away from all distractions and noise. · Lie down on your back, or sit with your back straight. · Focus on your breathing. Make it slow and steady. · Breathe in through your nose. Breathe out through either your nose or mouth. · Breathe deeply, filling up the area between your navel and your rib cage. Breathe so that your belly goes up and down. · Do not hold your breath. · Breathe like this for 5 to 10 minutes. Notice the feeling of calmness throughout your whole body. As you continue to breathe slowly and deeply, relax by doing the following for another 5 to 10 minutes:  · Tighten and relax each muscle group in your body. You can begin at your toes and work your way up to your head. · Imagine your muscle groups relaxing and becoming heavy. · Empty your mind of all thoughts. · Let yourself relax more and more deeply. · Become aware of the state of calmness that surrounds you. · When your relaxation time is over, you can bring yourself back to alertness by moving your fingers and toes and then your hands and feet and then stretching and moving your entire body.  Sometimes people fall asleep during relaxation, but they usually wake up shortly afterward. · Always give yourself time to return to full alertness before you drive a car or do anything that might cause an accident if you are not fully alert. Never play a relaxation tape while you drive a car. When should you call for help? Call 911 anytime you think you may need emergency care. For example, call if:    · You feel you cannot stop from hurting yourself or someone else. Keep the numbers for these national suicide hotlines: 3-928-202-TALK (8-738.771.4289) and 5-016-AWBQXAM (5-379.274.9675). If you or someone you know talks about suicide or feeling hopeless, get help right away. Watch closely for changes in your health, and be sure to contact your doctor if:    · You have anxiety or fear that affects your life.     · You have symptoms of anxiety that are new or different from those you had before. Where can you learn more? Go to http://www.west.com/  Enter P754 in the search box to learn more about \"Anxiety Disorder: Care Instructions. \"  Current as of: June 16, 2021               Content Version: 13.2  © 4488-6261 [employer]. Care instructions adapted under license by [employer] (which disclaims liability or warranty for this information). If you have questions about a medical condition or this instruction, always ask your healthcare professional. [place] [address] any warranty or liability for your use of this information. Body Mass Index: Care Instructions  Your Care Instructions     Body mass index (BMI) can help you see if your weight is raising your risk for health problems. It uses a formula to compare how much you weigh with how tall you are. · A BMI lower than 18.5 is considered underweight. · A BMI between 18.5 and 24.9 is considered healthy. · A BMI between 25 and 29.9 is considered overweight. A BMI of 30 or higher is considered obese.   If your BMI is in the normal range, it means that you have a lower risk for weight-related health problems. If your BMI is in the overweight or obese range, you may be at increased risk for weight-related health problems, such as high blood pressure, heart disease, stroke, arthritis or joint pain, and diabetes. If your BMI is in the underweight range, you may be at increased risk for health problems such as fatigue, lower protection (immunity) against illness, muscle loss, bone loss, hair loss, and hormone problems. BMI is just one measure of your risk for weight-related health problems. You may be at higher risk for health problems if you are not active, you eat an unhealthy diet, or you drink too much alcohol or use tobacco products. Follow-up care is a key part of your treatment and safety. Be sure to make and go to all appointments, and call your doctor if you are having problems. It's also a good idea to know your test results and keep a list of the medicines you take. How can you care for yourself at home? · Practice healthy eating habits. This includes eating plenty of fruits, vegetables, whole grains, lean protein, and low-fat dairy. · If your doctor recommends it, get more exercise. Walking is a good choice. Bit by bit, increase the amount you walk every day. Try for at least 30 minutes on most days of the week. · Do not smoke. Smoking can increase your risk for health problems. If you need help quitting, talk to your doctor about stop-smoking programs and medicines. These can increase your chances of quitting for good. · Limit alcohol to 2 drinks a day for men and 1 drink a day for women. Too much alcohol can cause health problems. If you have a BMI higher than 25  · Your doctor may do other tests to check your risk for weight-related health problems.  This may include measuring the distance around your waist. A waist measurement of more than 40 inches in men or 35 inches in women can increase the risk of weight-related health problems. · Talk with your doctor about steps you can take to stay healthy or improve your health. You may need to make lifestyle changes to lose weight and stay healthy, such as changing your diet and getting regular exercise. If you have a BMI lower than 18.5  · Your doctor may do other tests to check your risk for health problems. · Talk with your doctor about steps you can take to stay healthy or improve your health. You may need to make lifestyle changes to gain or maintain weight and stay healthy, such as getting more healthy foods in your diet and doing exercises to build muscle. Where can you learn more? Go to http://www.west.com/  Enter S176 in the search box to learn more about \"Body Mass Index: Care Instructions. \"  Current as of: December 27, 2021               Content Version: 13.2  © 2006-2022 [employer]. Care instructions adapted under license by [employer] (which disclaims liability or warranty for this information). If you have questions about a medical condition or this instruction, always ask your healthcare professional. [place] [address] any warranty or liability for your use of this information. Learning About the [address] Diet  What is the Mediterranean diet? The Mediterranean diet is a style of eating rather than a diet plan. It features foods eaten in [place], [place], [place], and other countries along the [facility]. It emphasizes eating foods like fish, fruits, vegetables, beans, high-fiber breads and whole grains, nuts, and olive oil. This style of eating includes limited red meat, cheese, and sweets. Why choose the Mediterranean diet? A Mediterranean-style diet may improve heart health. It contains more fat than other heart-healthy diets.  But the fats are mainly from nuts, unsaturated oils (such as fish oils and olive oil), and certain nut or seed oils (such as canola, soybean, or flaxseed oil). These fats may help protect the heart and blood vessels. How can you get started on the Mediterranean diet? Here are some things you can do to switch to a more Mediterranean way of eating. What to eat  · Eat a variety of fruits and vegetables each day, such as grapes, blueberries, tomatoes, broccoli, peppers, figs, olives, spinach, eggplant, beans, lentils, and chickpeas. · Eat a variety of whole-grain foods each day, such as oats, brown rice, and whole wheat bread, pasta, and couscous. · Eat fish at least 2 times a week. Try tuna, salmon, mackerel, lake trout, herring, or sardines. · Eat moderate amounts of low-fat dairy products, such as milk, cheese, or yogurt. · Eat moderate amounts of poultry and eggs. · Choose healthy (unsaturated) fats, such as nuts, olive oil, and certain nut or seed oils like canola, soybean, and flaxseed. · Limit unhealthy (saturated) fats, such as butter, palm oil, and coconut oil. And limit fats found in animal products, such as meat and dairy products made with whole milk. Try to eat red meat only a few times a month in very small amounts. · Limit sweets and desserts to only a few times a week. This includes sugar-sweetened drinks like soda. The Mediterranean diet may also include red wine with your meal--1 glass each day for women and up to 2 glasses a day for men. Tips for eating at home  · Use herbs, spices, garlic, lemon zest, and citrus juice instead of salt to add flavor to foods. · Add avocado slices to your sandwich instead of hernandez. · Have fish for lunch or dinner instead of red meat. Brush the fish with olive oil, and broil or grill it. · Sprinkle your salad with seeds or nuts instead of cheese. · Cook with olive or canola oil instead of butter or oils that are high in saturated fat. · Switch from 2% milk or whole milk to 1% or fat-free milk.   · Dip raw vegetables in a vinaigrette dressing or hummus instead of dips made from mayonnaise or sour cream.  · Have a piece of fruit for dessert instead of a piece of cake. Try baked apples, or have some dried fruit. Tips for eating out  · Try broiled, grilled, baked, or poached fish instead of having it fried or breaded. · Ask your  to have your meals prepared with olive oil instead of butter. · Order dishes made with marinara sauce or sauces made from olive oil. Avoid sauces made from cream or mayonnaise. · Choose whole-grain breads, whole wheat pasta and pizza crust, brown rice, beans, and lentils. · Cut back on butter or margarine on bread. Instead, you can dip your bread in a small amount of olive oil. · Ask for a side salad or grilled vegetables instead of french fries or chips. Where can you learn more? Go to http://www.west.com/  Enter O407 in the search box to learn more about \"Learning About the Mediterranean Diet. \"  Current as of: September 8, 2021               Content Version: 13.2  © 2427-5258 [employer]. Care instructions adapted under license by [employer] (which disclaims liability or warranty for this information). If you have questions about a medical condition or this instruction, always ask your healthcare professional. [address] any warranty or liability for your use of this information. Learning About High Blood Pressure  What is high blood pressure? Blood pressure is a measure of how hard the blood pushes against the walls of your arteries. It's normal for blood pressure to go up and down throughout the day. But if it stays up, you have high blood pressure. Another name for high blood pressure is hypertension. Two numbers tell you your blood pressure. The first number is the systolic pressure (top number). It shows how hard the blood pushes when your heart is pumping. The second number is the diastolic pressure (bottom number).  It shows how hard the blood pushes between heartbeats, when your heart is relaxed and filling with blood. Your doctor will give you a goal for your blood pressure based on your health and your age. High blood pressure (hypertension) means that the top number stays high, or the bottom number stays high, or both. High blood pressure increases the risk of stroke, heart attack, and other problems. What happens when you have high blood pressure? · Blood flows through your arteries with too much force. Over time, this can damage the heart and the walls of your arteries. But you can't feel it. High blood pressure usually doesn't cause symptoms. · High blood pressure makes your heart work harder. And that can lead to heart failure, which means your heart doesn't pump as much blood as your body needs. · Fat and calcium start to build up in your arteries. This buildup is called hardening of the arteries. It can cause many problems including a heart attack and stroke. · Arteries also carry blood and oxygen to organs like your eyes, kidneys, and brain. If high blood pressure damages those arteries, it can lead to vision loss, kidney disease, stroke, and a higher risk of dementia. How can you prevent high blood pressure? · Stay at a healthy weight. · Try to limit how much sodium you eat to less than 2,300 milligrams (mg) a day. If you limit your sodium to 1,500 mg a day, you can lower your blood pressure even more. ? Buy foods that are labeled \"unsalted,\" \"sodium-free,\" or \"low-sodium. \" Foods labeled \"reduced-sodium\" and \"light sodium\" may still have too much sodium. ? Flavor your food with garlic, lemon juice, onion, vinegar, herbs, and spices instead of salt. Do not use soy sauce, steak sauce, onion salt, garlic salt, mustard, or ketchup on your food. ? Use less salt (or none) when recipes call for it. You can often use half the salt a recipe calls for without losing flavor. · Be physically active.  Get at least 30 minutes of exercise on most days of the week. Walking is a good choice. You also may want to do other activities, such as running, swimming, cycling, or playing tennis or team sports. · Limit alcohol to 2 drinks a day for men and 1 drink a day for women. · Eat plenty of fruits, vegetables, and low-fat dairy products. Eat less saturated and total fats. How is high blood pressure treated? · Your doctor will suggest making lifestyle changes to help your heart. For example, your doctor may ask you to eat healthy foods, quit smoking, lose extra weight, and be more active. · If lifestyle changes don't help enough, your doctor may recommend that you take medicine. · When blood pressure is very high, medicines are needed to lower it. Follow-up care is a key part of your treatment and safety. Be sure to make and go to all appointments, and call your doctor if you are having problems. It's also a good idea to know your test results and keep a list of the medicines you take. Where can you learn more? Go to http://www.[employer].com/  Enter P501 in the search box to learn more about \"Learning About High Blood Pressure. \"  Current as of: January 10, 2022               Content Version: 13.2  © 6657-2689 Healthwise, Incorporated. Care instructions adapted under license by [employer] (which disclaims liability or warranty for this information). If you have questions about a medical condition or this instruction, always ask your healthcare professional. [place] [address] any warranty or liability for your use of this information.

## 2022-05-23 ENCOUNTER — TELEPHONE (OUTPATIENT)
Dept: PHYSICAL THERAPY | Age: 32
End: 2022-05-23

## 2022-05-23 ENCOUNTER — OFFICE VISIT (OUTPATIENT)
Dept: ORTHOPEDIC SURGERY | Age: 32
End: 2022-05-23
Payer: COMMERCIAL

## 2022-05-23 VITALS
BODY MASS INDEX: 35.61 KG/M2 | TEMPERATURE: 97.3 F | WEIGHT: 254.4 LBS | HEART RATE: 93 BPM | HEIGHT: 71 IN | OXYGEN SATURATION: 95 %

## 2022-05-23 DIAGNOSIS — Z98.890 S/P ACL RECONSTRUCTION: ICD-10-CM

## 2022-05-23 DIAGNOSIS — S83.282A TEAR OF LATERAL MENISCUS OF LEFT KNEE, CURRENT, UNSPECIFIED TEAR TYPE, INITIAL ENCOUNTER: ICD-10-CM

## 2022-05-23 DIAGNOSIS — S83.242A TEAR OF MEDIAL MENISCUS OF LEFT KNEE, CURRENT, UNSPECIFIED TEAR TYPE, INITIAL ENCOUNTER: ICD-10-CM

## 2022-05-23 DIAGNOSIS — M25.562 CHRONIC PAIN OF LEFT KNEE: Primary | ICD-10-CM

## 2022-05-23 DIAGNOSIS — G89.29 CHRONIC PAIN OF LEFT KNEE: Primary | ICD-10-CM

## 2022-05-23 DIAGNOSIS — M17.32 POST-TRAUMATIC OSTEOARTHRITIS OF LEFT KNEE: ICD-10-CM

## 2022-05-23 PROCEDURE — 99213 OFFICE O/P EST LOW 20 MIN: CPT | Performed by: SPECIALIST

## 2022-05-23 NOTE — PROGRESS NOTES
Patient: Lawrence Velasquez. MRN: [id number]       SSN: [id number]  YOB: 1990        AGE: 29 y.o. SEX: male    PCP: Cindy Posadas MD  05/23/22    CC: LEFT KNEE PAIN AND SWELLING    HISTORY:  Lawrence Velarde is a 29 y.o. male who is seen for left knee pain and swelling. He responded to his Visco-3 series on 10/1/21 but his pains have returned. He has been experiencing knee pain for the past 2 years. He feels like his knee is tight. He sustained a severe left knee injury in June 2017. He fell into a ditch while walking. He sustained an ACL tear, MCL tear, and a left femoral medial epicondylar avulsion fracture. He underwent left ACL repair in [place] a month after injury. He feels tenderness over his incision site. He has felt constant pain since his surgery. He notes intermittent left knee swelling. He notes pain with standing, walking, and stair climbing. He experiences startup pain after sitting. He was seen by Dr. Molina Spence in May. He states that he did get along well Dr. Molina Spence because he was rude according to Mr. Fercho Obregon. He completed a successful Euflexxa series on 10/10/19. He feels increased pain while walking on the treadmill He has tried a variety of conservative measures including cortisone injections, activity modification, bracing, NSAIDs and physical therapy. Pain Assessment  5/23/2022   Location of Pain Knee   Location Modifiers Left   Severity of Pain 5   Quality of Pain Throbbing   Quality of Pain Comment -   Duration of Pain Persistent   Frequency of Pain Constant   Aggravating Factors Walking;Standing   Aggravating Factors Comment -   Limiting Behavior -   Relieving Factors Elevation   Relieving Factors Comment -   Result of Injury No   Work-Related Injury -   Type of Injury -     Occupation, etc:  Mr. Fercho Obregon receives social security disability benefits for his left knee. 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  He moved to this area from [place] to be closer to his mother. He lives in [place] with his brother. He gained 30 pounds recently due to inactivity as a result of the Coronavirus pandemic. He has had trouble losing the weight. He is not able to exercise much due to his knee pain. He has been swimming for exercise lately. He is a [address] fan. He is not diabetic or hypertensive. Mr. Paco Gaviria weighs 270 lbs and is 6'0\" tall. No results found for: HBA1C, [id number], XPX3BZUX, [id number]  Weight Metrics 5/23/2022 3/28/2022 1/10/2022 10/1/2021 9/24/2021 9/17/2021 8/20/2021   Weight 254 lb 6.4 oz 267 lb 277 lb 270 lb 270 lb 270 lb 269 lb 6.4 oz   BMI 35.48 kg/m2 37.24 kg/m2 38.63 kg/m2 37.66 kg/m2 37.66 kg/m2 36.62 kg/m2 36.54 kg/m2       Patient Active Problem List   Diagnosis Code    Severe obesity ([facility] [address] E66.01     REVIEW OF SYSTEMS: All Below are Negative except: See HPI   Constitutional: negative for fever, chills, and weight loss. Cardiovascular: negative for chest pain, claudication, leg swelling, SOB, ROMANO   Gastrointestinal: Negative for pain, N/V/C/D, Blood in stool or urine, dysuria, hematuria, incontinence, pelvic pain. Musculoskeletal: See HPI   Neurological: Negative for dizziness and weakness. Negative for headaches, Visual changes, confusion, seizures   Phychiatric/Behavioral: Negative for depression, memory loss, substance abuse. Extremities: Negative for hair changes, rash, or skin lesion changes. Hematologic: Negative for bleeding problems, bruising, pallor or swollen lymph nodes   Peripheral Vascular: No calf pain, no circulation deficits.     Social History     Socioeconomic History    Marital status: SINGLE     Spouse name: Not on file    Number of children: Not on file    Years of education: Not on file    Highest education level: Not on file   Occupational History    Not on file   Tobacco Use    Smoking status: Never Smoker    Smokeless tobacco: Never Used   Substance and Sexual Activity    Alcohol use: Yes     Comment: social     Drug use: Never    Sexual activity: Yes     Partners: Male   Other Topics Concern    Not on file   Social History Narrative    Not on file     Social Determinants of Health     Financial Resource Strain:     Difficulty of Paying Living Expenses: Not on file   Food Insecurity:     Worried About Running Out of Food in the Last Year: Not on file    Alejandra of Food in the Last Year: Not on file   Transportation Needs:     Lack of Transportation (Medical): Not on file    Lack of Transportation (Non-Medical): Not on file   Physical Activity:     Days of Exercise per Week: Not on file    Minutes of Exercise per Session: Not on file   Stress:     Feeling of Stress : Not on file   Social Connections:     Frequency of Communication with Friends and Family: Not on file    Frequency of Social Gatherings with Friends and Family: Not on file    Attends Judaism Services: Not on file    Active Member of [address] [place] [address] or Organizations: Not on file    Attends Club or Organization Meetings: Not on file    Marital Status: Not on file   Intimate Partner Violence:     Fear of Current or Ex-Partner: Not on file    Emotionally Abused: Not on file    Physically Abused: Not on file    Sexually Abused: Not on file   Housing Stability:     Unable to Pay for Housing in the Last Year: Not on file    Number of [place] in the Last Year: Not on file    Unstable Housing in the Last Year: Not on file      No Known Allergies   Current Outpatient Medications   Medication Sig    lisinopril-hydroCHLOROthiazide (PRINZIDE, ZESTORETIC) 20-25 mg per tablet Take 1 Tablet by mouth daily.  fluticasone propionate (FLONASE) 50 mcg/actuation nasal spray USE ONE SQUIRT IN EACH NOSTRIL DAILY    traMADoL (ULTRAM) 50 mg tablet Take 50 mg by mouth every six (6) hours as needed for Pain.     pregabalin (LYRICA) 150 mg capsule     clonazePAM (KlonoPIN) 0.5 mg tablet     Blood Pressure Test Kit-Medium kit 1 Each by Does Not Apply route two (2) times a day.  sertraline (ZOLOFT) 50 mg tablet Take  by mouth daily. No current facility-administered medications for this visit. PHYSICAL EXAMINATION:  Visit Vitals  Pulse 93   Temp 97.3 °F (36.3 °C) (Temporal)   Ht 5' 11\" (1.803 m)   Wt 254 lb 6.4 oz (115.4 kg)   SpO2 95%   BMI 35.48 kg/m²      ORTHO EXAMINATION:  Examination Right knee Left knee   Skin Intact Intact, well healed curving anteromedial ACL reconstruction scar   Range of motion 120-0 110-0   Effusion - +++   Medial joint line tenderness - +   Lateral joint line tenderness - -   Popliteal tenderness - -   Osteophytes palpable - +   Bens - -   Patella crepitus - +   Anterior drawer - 1+   Lateral laxity - -   Medial laxity - -   Varus deformity - -   Valgus deformity - -   Pretibial edema - -   Calf tenderness - -     MRI LEFT KNEE 3/22/19 [facility]  Impression:  1. Surgical changes of prior ACL and MCL reconstruction with tendon grafts which appear intact. 2. 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 3. Medial and lateral meniscal tears as discussed. 4. Low-grade chondrosis in the lateral compartment. RADIOGRAPHS:  XR LEFT KNEE 3/11/19 [facility]  IMPRESSION:  Three views - No fractures, no effusion, mild joint space narrowing, + osteophytes present. Kellgren Kory grade 1 screws present, tunnels c/w ACL reconstruction    IMPRESSION:      ICD-10-CM ICD-9-CM    1. Chronic pain of left knee  M25.562 719.46 REFERRAL TO PHYSICAL THERAPY    G89.29 338.29 PROCEDURE AUTHORIZATION TO    2. Post-traumatic osteoarthritis of left knee  M17.32 715.26 REFERRAL TO PHYSICAL THERAPY      PROCEDURE AUTHORIZATION TO    3. S/P ACL reconstruction  Z98.890 V45.89 REFERRAL TO PHYSICAL THERAPY      PROCEDURE AUTHORIZATION TO    4.  Tear of medial meniscus of left knee, current, unspecified tear type, initial encounter  P37.136T 836.0 REFERRAL TO PHYSICAL THERAPY      PROCEDURE AUTHORIZATION TO    5. Tear of lateral meniscus of left knee, current, unspecified tear type, initial encounter  S83.089A 836.1 REFERRAL TO PHYSICAL THERAPY      PROCEDURE AUTHORIZATION TO      PLAN: Consider visco supplementation if pain continues. He will start a brief course of outpatient aqua physical therapy. We discussed possible need for a left knee arthroscopy at some time in the future if pain continues. He will follow up as needed.      Scribed by MD Rosette Ulloa) as dictated by Ugo Everett MD

## 2022-06-10 ENCOUNTER — DOCUMENTATION ONLY (OUTPATIENT)
Dept: ORTHOPEDIC SURGERY | Age: 32
End: 2022-06-10

## 2022-06-14 ENCOUNTER — OFFICE VISIT (OUTPATIENT)
Dept: ORTHOPEDIC SURGERY | Age: 32
End: 2022-06-14
Payer: COMMERCIAL

## 2022-06-14 VITALS
TEMPERATURE: 97.3 F | WEIGHT: 248.2 LBS | BODY MASS INDEX: 34.75 KG/M2 | OXYGEN SATURATION: 96 % | HEIGHT: 71 IN | HEART RATE: 88 BPM

## 2022-06-14 DIAGNOSIS — G89.29 CHRONIC PAIN OF LEFT KNEE: ICD-10-CM

## 2022-06-14 DIAGNOSIS — M25.562 CHRONIC PAIN OF LEFT KNEE: ICD-10-CM

## 2022-06-14 DIAGNOSIS — M17.12 PRIMARY OSTEOARTHRITIS OF LEFT KNEE: Primary | ICD-10-CM

## 2022-06-14 DIAGNOSIS — M25.462 EFFUSION OF LEFT KNEE: ICD-10-CM

## 2022-06-14 PROCEDURE — 20610 DRAIN/INJ JOINT/BURSA W/O US: CPT | Performed by: SPECIALIST

## 2022-06-14 RX ORDER — HYALURONATE SODIUM 10 MG/ML
25 SYRINGE (ML) INTRAARTICULAR ONCE
Status: COMPLETED | OUTPATIENT
Start: 2022-06-14 | End: 2022-06-14

## 2022-06-14 RX ADMIN — Medication 25 MG: at 13:46

## 2022-06-14 NOTE — PROGRESS NOTES
Patient: Anish Thomas MRN: [id number]       SSN: [id number]  YOB: 1990        AGE: 29 y.o. SEX: male    PCP: Rico Garcia MD  06/14/22    CC: LEFT KNEE PAIN AND SWELLING    HISTORY:  Anish Thomas is a 29 y.o. male who is seen for left knee pain and swelling. He responded to his Visco-3 series on 10/1/21 but his pains have returned. He has been experiencing knee pain for the past 2 years. He feels like his knee is tight. He sustained a severe left knee injury in June 2017. He fell into a ditch while walking. He sustained an ACL tear, MCL tear, and a left femoral medial epicondylar avulsion fracture. He underwent left ACL repair in [place] a month after injury. He feels tenderness over his incision site. He has felt constant pain since his surgery. He notes intermittent left knee swelling. He notes pain with standing, walking, and stair climbing. He experiences startup pain after sitting. He was seen by Dr. Serafin Kimble in May. He states that he did get along well Dr. Serafin Kimble because he was rude according to Mr. Ramon Flores. He completed a successful Euflexxa series on 10/10/19. He feels increased pain while walking on the treadmill He has tried a variety of conservative measures including cortisone injections, activity modification, bracing, NSAIDs and physical therapy. Pain Assessment  6/14/2022   Location of Pain Knee   Location Modifiers Left   Severity of Pain 4   Quality of Pain Throbbing   Quality of Pain Comment -   Duration of Pain Persistent   Frequency of Pain Constant   Aggravating Factors Walking;Standing;Bending;Squatting   Aggravating Factors Comment -   Limiting Behavior Yes   Relieving Factors Elevation   Relieving Factors Comment -   Result of Injury No   Work-Related Injury -   Type of Injury -     Occupation, etc:  Mr. Ramon Flores receives social security disability benefits for his left knee.  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 He moved to this area from [place] to be closer to his mother. He lives in [place] with his brother. He gained 30 pounds recently due to inactivity as a result of the Coronavirus pandemic. He has had trouble losing the weight. He is not able to exercise much due to his knee pain. He has been swimming for exercise lately. He is a [address] fan. He is not diabetic or hypertensive. Mr. Iqra Murphy weighs 248 lbs and is 6'0\" tall. He has lost 20 pounds. No results found for: HBA1C, NJT4GBPR, RWD7ZQHD, DBP3VFOY  Weight Metrics 6/14/2022 5/23/2022 3/28/2022 1/10/2022 10/1/2021 9/24/2021 9/17/2021   Weight 248 lb 3.2 oz 254 lb 6.4 oz 267 lb 277 lb 270 lb 270 lb 270 lb   BMI 34.62 kg/m2 35.48 kg/m2 37.24 kg/m2 38.63 kg/m2 37.66 kg/m2 37.66 kg/m2 36.62 kg/m2       Patient Active Problem List   Diagnosis Code    Severe obesity ([facility] [address] E66.01     REVIEW OF SYSTEMS: All Below are Negative except: See HPI   Constitutional: negative for fever, chills, and weight loss. Cardiovascular: negative for chest pain, claudication, leg swelling, SOB, ROMANO   Gastrointestinal: Negative for pain, N/V/C/D, Blood in stool or urine, dysuria, hematuria, incontinence, pelvic pain. Musculoskeletal: See HPI   Neurological: Negative for dizziness and weakness. Negative for headaches, Visual changes, confusion, seizures   Phychiatric/Behavioral: Negative for depression, memory loss, substance abuse. Extremities: Negative for hair changes, rash, or skin lesion changes. Hematologic: Negative for bleeding problems, bruising, pallor or swollen lymph nodes   Peripheral Vascular: No calf pain, no circulation deficits.     Social History     Socioeconomic History    Marital status: SINGLE     Spouse name: Not on file    Number of children: Not on file    Years of education: Not on file    Highest education level: Not on file   Occupational History    Not on file   Tobacco Use    Smoking status: Never Smoker    Smokeless tobacco: Never Used Substance and Sexual Activity    Alcohol use: Yes     Comment: social     Drug use: Never    Sexual activity: Yes     Partners: Male   Other Topics Concern    Not on file   Social History Narrative    Not on file     Social Determinants of Health     Financial Resource Strain:     Difficulty of Paying Living Expenses: Not on file   Food Insecurity:     Worried About Running Out of Food in the Last Year: Not on file    Alejandra of Food in the Last Year: Not on file   Transportation Needs:     Lack of Transportation (Medical): Not on file    Lack of Transportation (Non-Medical): Not on file   Physical Activity:     Days of Exercise per Week: Not on file    Minutes of Exercise per Session: Not on file   Stress:     Feeling of Stress : Not on file   Social Connections:     Frequency of Communication with Friends and Family: Not on file    Frequency of Social Gatherings with Friends and Family: Not on file    Attends Oriental orthodox Services: Not on file    Active Member of [address] [place] [address] or Organizations: Not on file    Attends Club or Organization Meetings: Not on file    Marital Status: Not on file   Intimate Partner Violence:     Fear of Current or Ex-Partner: Not on file    Emotionally Abused: Not on file    Physically Abused: Not on file    Sexually Abused: Not on file   Housing Stability:     Unable to Pay for Housing in the Last Year: Not on file    Number of [place] in the Last Year: Not on file    Unstable Housing in the Last Year: Not on file      No Known Allergies   Current Outpatient Medications   Medication Sig    lisinopril-hydroCHLOROthiazide (PRINZIDE, ZESTORETIC) 20-25 mg per tablet Take 1 Tablet by mouth daily.  fluticasone propionate (FLONASE) 50 mcg/actuation nasal spray USE ONE SQUIRT IN EACH NOSTRIL DAILY    traMADoL (ULTRAM) 50 mg tablet Take 50 mg by mouth every six (6) hours as needed for Pain.     pregabalin (LYRICA) 150 mg capsule     clonazePAM (KlonoPIN) 0.5 mg tablet  Blood Pressure Test Kit-Medium kit 1 Each by Does Not Apply route two (2) times a day.  sertraline (ZOLOFT) 50 mg tablet Take  by mouth daily. No current facility-administered medications for this visit. PHYSICAL EXAMINATION:  Visit Vitals  Pulse 88   Temp 97.3 °F (36.3 °C) (Temporal)   Ht 5' 11\" (1.803 m)   Wt 248 lb 3.2 oz (112.6 kg)   SpO2 96%   BMI 34.62 kg/m²      ORTHO EXAMINATION:  Examination Right knee Left knee   Skin Intact Intact, well healed curving anteromedial ACL reconstruction scar   Range of motion 120-0 110-0   Effusion - +++   Medial joint line tenderness - +   Lateral joint line tenderness - -   Popliteal tenderness - -   Osteophytes palpable - +   Bens - -   Patella crepitus - +   Anterior drawer - 1+   Lateral laxity - -   Medial laxity - -   Varus deformity - -   Valgus deformity - -   Pretibial edema - -   Calf tenderness - -     TIME OUT:  Chart reviewed for the following:   Ples Schilder, MD, have reviewed the History, Physical and updated the Allergic reactions for [address] performed immediately prior to start of procedure:  Ples Schilder, MD, have performed the following reviews on [place] [address]. prior to the start of the procedure:          * Patient was identified by name and date of birth   * Agreement on procedure being performed was verified  * Risks and Benefits explained to the patient  * Procedure site verified and marked as necessary  * Patient was positioned for comfort  * Consent was obtained     Time: 1:42 PM     Date of procedure: 6/14/2022  Procedure performed by:  Chandu Mukherjee MD  Mr. Carine Borrero tolerated the procedure well with no complications. MRI LEFT KNEE 3/22/19 [facility]  Impression:  1. Surgical changes of prior ACL and MCL reconstruction with tendon grafts which appear intact. 2. 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   3. Medial and lateral meniscal tears as discussed. 4. Low-grade chondrosis in the lateral compartment. RADIOGRAPHS:  XR LEFT KNEE 3/11/19 [facility]  IMPRESSION:  Three views - No fractures, no effusion, mild joint space narrowing, + osteophytes present. Kellgren Kory grade 1 screws present, tunnels c/w ACL reconstruction    IMPRESSION:      ICD-10-CM ICD-9-CM    1. Primary osteoarthritis of left knee  M17.12 715.16 DRAIN/INJECT LARGE JOINT/BURSA      sodium hyaluronate (SUPARTZ FX/HYALGAN/GENIVSC) 10 mg/mL injection syrg 25 mg   2. Chronic pain of left knee  M25.562 719.46 DRAIN/INJECT LARGE JOINT/BURSA    G89.29 338.29 sodium hyaluronate (SUPARTZ FX/HYALGAN/GENIVSC) 10 mg/mL injection syrg 25 mg   3. Effusion of left knee  M25.462 719.06      PLAN: After discussing treatment options, patient's left knee was injected with 2 cc Visco-3. There is no need for surgery. He will follow up in 1 week for Visco-3 #2.       Scribed by Naun Martini ([address]) as dictated by Nanci Luevano MD

## 2022-06-20 ENCOUNTER — OFFICE VISIT (OUTPATIENT)
Dept: ORTHOPEDIC SURGERY | Age: 32
End: 2022-06-20
Payer: COMMERCIAL

## 2022-06-20 VITALS — BODY MASS INDEX: 34.72 KG/M2 | HEIGHT: 71 IN | WEIGHT: 248 LBS | TEMPERATURE: 97.5 F

## 2022-06-20 DIAGNOSIS — G89.29 CHRONIC PAIN OF LEFT KNEE: ICD-10-CM

## 2022-06-20 DIAGNOSIS — M25.562 CHRONIC PAIN OF LEFT KNEE: ICD-10-CM

## 2022-06-20 DIAGNOSIS — M17.12 PRIMARY OSTEOARTHRITIS OF LEFT KNEE: Primary | ICD-10-CM

## 2022-06-20 PROCEDURE — 20610 DRAIN/INJ JOINT/BURSA W/O US: CPT | Performed by: SPECIALIST

## 2022-06-20 RX ORDER — HYALURONATE SODIUM 10 MG/ML
25 SYRINGE (ML) INTRAARTICULAR ONCE
Status: COMPLETED | OUTPATIENT
Start: 2022-06-20 | End: 2022-06-20

## 2022-06-20 RX ADMIN — Medication 25 MG: at 10:38

## 2022-06-20 NOTE — PROGRESS NOTES
Patient: Danielle Tran. MRN: [id number]       SSN: [id number]  YOB: 1990        AGE: 29 y.o. SEX: male  Body mass index is 34.59 kg/m². PCP: Man Brown MD  06/20/22    Chief Complaint   Patient presents with    Injection     lt knee vico3 #2     HISTORY:  Danielle Tran. is a 29 y.o. male who is seen for left knee pain. ICD-10-CM ICD-9-CM    1. Primary osteoarthritis of left knee  M17.12 715.16 sodium hyaluronate (SUPARTZ FX/HYALGAN/GENIVSC) 10 mg/mL injection syrg 25 mg      DRAIN/INJECT LARGE JOINT/BURSA   2. Chronic pain of left knee  M25.562 719.46 sodium hyaluronate (SUPARTZ FX/HYALGAN/GENIVSC) 10 mg/mL injection syrg 25 mg    G89.29 338.29 DRAIN/INJECT LARGE JOINT/BURSA       Chart reviewed for the following:   Blade Hobson MD, have reviewed the History, Physical and updated the Allergic reactions for [facility] performed immediately prior to start of procedure:  Blade Hobson MD, have performed the following reviews on Danielle Tran. prior to the start of the procedure:            * Patient was identified by name and date of birth   * Agreement on procedure being performed was verified  * Risks and Benefits explained to the patient  * Procedure site verified and marked as necessary  * Patient was positioned for comfort  * Consent was obtained     Time: 10:37 AM       Date of procedure: 6/20/2022    Procedure performed by:  Hair George MD    Mr. Bo Yadav tolerated the procedure well with no complications. PLAN:  After discussing treatment options, patient's left knee was injected with 2 cc of visco-3. Mr. Bo Yadav will follow up in one week to complete his visco supplementation injection series.       Scribed by Hair George MD [facility]) as dictated by Hair George MD

## 2022-06-24 ENCOUNTER — OFFICE VISIT (OUTPATIENT)
Dept: ORTHOPEDIC SURGERY | Age: 32
End: 2022-06-24
Payer: COMMERCIAL

## 2022-06-24 VITALS
TEMPERATURE: 96.8 F | HEIGHT: 71 IN | OXYGEN SATURATION: 98 % | HEART RATE: 100 BPM | WEIGHT: 242 LBS | BODY MASS INDEX: 33.88 KG/M2

## 2022-06-24 DIAGNOSIS — G89.29 CHRONIC PAIN OF LEFT KNEE: ICD-10-CM

## 2022-06-24 DIAGNOSIS — M17.12 PRIMARY OSTEOARTHRITIS OF LEFT KNEE: Primary | ICD-10-CM

## 2022-06-24 DIAGNOSIS — M25.562 CHRONIC PAIN OF LEFT KNEE: ICD-10-CM

## 2022-06-24 PROCEDURE — 20610 DRAIN/INJ JOINT/BURSA W/O US: CPT | Performed by: SPECIALIST

## 2022-06-24 RX ORDER — HYALURONATE SODIUM 10 MG/ML
25 SYRINGE (ML) INTRAARTICULAR ONCE
Status: COMPLETED | OUTPATIENT
Start: 2022-06-24 | End: 2022-06-24

## 2022-06-24 RX ADMIN — Medication 25 MG: at 11:14

## 2022-06-24 NOTE — PROGRESS NOTES
Patient: Leoma Najjar. MRN: [id number]       SSN: [id number]  YOB: 1990        AGE: 29 y.o. SEX: male  Body mass index is 33.75 kg/m². PCP: Britni Lomeli MD  06/24/22    Chief Complaint   Patient presents with    Knee Pain     Left     HISTORY:  Leoma Najjar. is a 29 y.o. male who is seen for left knee pain. TIME OUT performed immediately prior to start of procedure:  Maia Horvath MD, have performed the following reviews on Leoma Najjar. prior to the start of the procedure:            * Patient was identified by name and date of birth   * Agreement on procedure being performed was verified  * Risks and Benefits explained to the patient  * Procedure site verified and marked as necessary  * Patient was positioned for comfort  * Consent was obtained     Time: 11:01 AM     Date of procedure: 6/24/2022    Procedure performed by:  Remi Acosta MD    Mr. Bruno Chawla tolerated the procedure well with no complications    No diagnosis found. PLAN:  After discussing treatment options, patient's left knee was injected with 2 cc of Visco-3. Mr. Bruno Chawla will follow up PRN now that he has completed his visco supplementation injection series.       Scribed by Db Quinonez ([address]) as dictated by Remi Acosta MD

## 2022-07-15 LAB — SARS-COV-2, NAA: POSITIVE

## 2022-11-03 ENCOUNTER — HOSPITAL ENCOUNTER (EMERGENCY)
Age: 32
Discharge: HOME | End: 2022-11-03
Attending: EMERGENCY MEDICINE
Payer: MEDICAID

## 2022-11-03 VITALS
OXYGEN SATURATION: 98 % | HEART RATE: 98 BPM | HEIGHT: 71 IN | TEMPERATURE: 97.7 F | DIASTOLIC BLOOD PRESSURE: 94 MMHG | WEIGHT: 240 LBS | RESPIRATION RATE: 18 BRPM | SYSTOLIC BLOOD PRESSURE: 146 MMHG | BODY MASS INDEX: 33.6 KG/M2
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
## 2022-11-03 DIAGNOSIS — S60.512A CAT SCRATCH OF LEFT HAND, INITIAL ENCOUNTER: Primary | ICD-10-CM

## 2022-11-03 PROCEDURE — 99283 EMERGENCY DEPT VISIT LOW MDM: CPT

## 2022-11-03 RX ORDER — NAPROXEN 500 MG/1
500 TABLET ORAL 2 TIMES DAILY WITH MEALS
Qty: 20 TABLET | Refills: 0 | Status: SHIPPED | OUTPATIENT
Start: 2022-11-03

## 2022-11-03 RX ORDER — AMOXICILLIN AND CLAVULANATE POTASSIUM 875; 125 MG/1; MG/1
1 TABLET, FILM COATED ORAL 2 TIMES DAILY
Qty: 20 TABLET | Refills: 0 | Status: SHIPPED | OUTPATIENT
Start: 2022-11-03 | End: 2022-11-13

## 2022-11-03 NOTE — ED NOTES
Pt states he was scratched on his left hand by a cat approx one hour ago. Pt states his left arm is numb. Pt states he rec'd a TD in July 2022.

## 2022-11-03 NOTE — ED PROVIDER NOTES
EMERGENCY DEPARTMENT HISTORY AND PHYSICAL EXAM        Date: 11/3/2022  Patient Name: Rasheed Amin. History of Presenting Illness     Chief Complaint   Patient presents with    Cat scratch       History Provided By: Patient    HPI: Rasheed Amin., 29 y.o. male PMHx significant for htn presents ambulatory to the ED. Pt reports he was scratched by a cat to his left hand about 1 hour prior to arrival.  Pt reports intermittent tingling to left arm. Patient right-handed. Patient has not taken anything for symptoms. Up-to-date on tetanus. Symptoms worse with movement. Denies taking blood thinners. Denies alleviating symptoms. There are no other complaints, changes, or physical findings at this time. PCP: Kerry Torres MD    No current facility-administered medications on file prior to encounter. Current Outpatient Medications on File Prior to Encounter   Medication Sig Dispense Refill    lisinopril-hydroCHLOROthiazide (PRINZIDE, ZESTORETIC) 20-25 mg per tablet Take 1 Tablet by mouth daily. 90 Tablet 3    fluticasone propionate (FLONASE) 50 mcg/actuation nasal spray USE ONE SQUIRT IN EACH NOSTRIL DAILY 1 Each 6    traMADoL (ULTRAM) 50 mg tablet Take 50 mg by mouth every six (6) hours as needed for Pain. pregabalin (LYRICA) 150 mg capsule       clonazePAM (KlonoPIN) 0.5 mg tablet       sertraline (ZOLOFT) 50 mg tablet Take  by mouth daily. Blood Pressure Test Kit-Medium kit 1 Each by Does Not Apply route two (2) times a day.  1 Kit 0       Past History     Past Medical History:  Past Medical History:   Diagnosis Date    Arthritis        Past Surgical History:  Past Surgical History:   Procedure Laterality Date    HX ACL RECONSTRUCTION      HX ORTHOPAEDIC      L knee surgery        Family History:  Family History   Problem Relation Age of Onset    Cancer Neg Hx     Diabetes Neg Hx     Heart Disease Neg Hx     Hypertension Neg Hx        Social History:  Social History     Tobacco Use Smoking status: Never    Smokeless tobacco: Never   Substance Use Topics    Alcohol use: Yes     Comment: social     Drug use: Never       Allergies:  No Known Allergies      Review of Systems   Review of Systems   Constitutional:  Negative for chills and fever. HENT:  Negative for facial swelling. Eyes:  Negative for photophobia and visual disturbance. Respiratory:  Negative for shortness of breath. Cardiovascular:  Negative for chest pain. Gastrointestinal:  Negative for abdominal pain, nausea and vomiting. Genitourinary:  Negative for flank pain. Skin:  Positive for wound. Negative for color change, pallor and rash. Neurological:  Negative for dizziness, weakness, light-headedness and headaches. All other systems reviewed and are negative. Physical Exam   Physical Exam  Vitals and nursing note reviewed. Constitutional:       General: He is not in acute distress. Appearance: He is well-developed. Comments: Pt in NAD   HENT:      Head: Normocephalic and atraumatic. Eyes:      Conjunctiva/sclera: Conjunctivae normal.   Cardiovascular:      Rate and Rhythm: Normal rate and regular rhythm. Heart sounds: Normal heart sounds. Pulmonary:      Effort: Pulmonary effort is normal. No respiratory distress. Breath sounds: Normal breath sounds. Abdominal:      General: Bowel sounds are normal. There is no distension. Palpations: Abdomen is soft. Musculoskeletal:         General: Normal range of motion. Skin:     General: Skin is warm. Findings: No rash. Comments: Left hand: Two small superficial abrasions to dorsal aspect of hand. Bleeding controlled. Surrounding sensation intact. Full AROM of hand intact. <2 sec cap refill to all fingers. Neurological:      Mental Status: He is alert and oriented to person, place, and time.    Psychiatric:         Behavior: Behavior normal.       Diagnostic Study Results     Labs -   No results found for this or any previous visit (from the past 12 hour(s)). Radiologic Studies -   No orders to display     CT Results  (Last 48 hours)      None          CXR Results  (Last 48 hours)      None            Medical Decision Making   I am the first provider for this patient. I reviewed the vital signs, available nursing notes, past medical history, past surgical history, family history and social history. Vital Signs-Reviewed the patient's vital signs. Patient Vitals for the past 12 hrs:   Temp Pulse Resp BP SpO2   11/03/22 0055 97.7 °F (36.5 °C) 98 18 (!) 146/94 98 %         Records Reviewed: Nursing Notes, Old Medical Records, Previous Radiology Studies, and Previous Laboratory Studies    Provider Notes (Medical Decision Making):   DDx: Cat scratch, Laceration, Abrasion    30 yo M who presents with cat scratch to left hand that occurred prior to arrival.  On exam 2 small abrasions. NVI. Full AROM intact. No x-ray ordered due to superficial nature of wound. Up-to-date on tetanus. Wound cleaned and dressed will discharge home with antibiotics. At time of discharge, pt non-toxic appearing in NAD. Pt stable for prompt outpatient follow-up with PCP 1 to 2 days. Patient given strict instructions to return if symptoms worsen. ED Course:   Initial assessment performed. The patients presenting problems have been discussed, and they are in agreement with the care plan formulated and outlined with them. I have encouraged them to ask questions as they arise throughout their visit. Disposition:  1:00 AM  Discussed dx and treatment plan. Discussed importance of PCP follow up. All questions answered. Pt voiced they understood. Return if sx worsen. PLAN:  1. Current Discharge Medication List        START taking these medications    Details   amoxicillin-clavulanate (Augmentin) 875-125 mg per tablet Take 1 Tablet by mouth two (2) times a day for 10 days.   Qty: 20 Tablet, Refills: 0  Start date: 11/3/2022, End date: 11/13/2022      naproxen (NAPROSYN) 500 mg tablet Take 1 Tablet by mouth two (2) times daily (with meals). Qty: 20 Tablet, Refills: 0  Start date: 11/3/2022           2. Follow-up Information       Follow up With Specialties Details Why Contact Info    Izabel Pyle MD Internal Medicine Physician Schedule an appointment as soon as possible for a visit in 1 day  [address] [facility] [address] [facility] EMERGENCY DEPT Emergency Medicine  As needed, If symptoms worsen [address]  [phone]          Return to ED if worse     Diagnosis     Clinical Impression:   1. Cat scratch of left hand, initial encounter        Attestations:    DAR Elizabeth    Please note that this dictation was completed with LiveOffice, the computer voice recognition software. Quite often unanticipated grammatical, syntax, homophones, and other interpretive errors are inadvertently transcribed by the computer software. Please disregard these errors. Please excuse any errors that have escaped final proofreading. Thank you. I reviewed with the ROXIE, Teresa Jaquez, the medical history and the resident's findings on the physical examination. I discussed with the ROXIE the patient's diagnosis and concur with the plan.

## 2022-11-17 DIAGNOSIS — R03.0 ELEVATED BLOOD PRESSURE READING IN OFFICE WITHOUT DIAGNOSIS OF HYPERTENSION: ICD-10-CM

## 2022-11-17 DIAGNOSIS — J30.9 ALLERGIC RHINITIS, UNSPECIFIED SEASONALITY, UNSPECIFIED TRIGGER: ICD-10-CM

## 2022-11-17 RX ORDER — FLUTICASONE PROPIONATE 50 MCG
SPRAY, SUSPENSION (ML) NASAL
Qty: 16 G | Refills: 3 | Status: SHIPPED | OUTPATIENT
Start: 2022-11-17

## 2022-11-17 RX ORDER — LISINOPRIL AND HYDROCHLOROTHIAZIDE 20; 25 MG/1; MG/1
TABLET ORAL
Qty: 90 TABLET | Refills: 3 | Status: SHIPPED | OUTPATIENT
Start: 2022-11-17

## 2023-02-14 DIAGNOSIS — M25.562 CHRONIC PAIN OF LEFT KNEE: Primary | ICD-10-CM

## 2023-02-14 DIAGNOSIS — M17.32 POST-TRAUMATIC OSTEOARTHRITIS OF LEFT KNEE: ICD-10-CM

## 2023-02-14 DIAGNOSIS — Z98.890 S/P ACL RECONSTRUCTION: ICD-10-CM

## 2023-02-14 DIAGNOSIS — G89.29 CHRONIC PAIN OF LEFT KNEE: Primary | ICD-10-CM

## 2023-02-16 ENCOUNTER — TELEPHONE (OUTPATIENT)
Facility: CLINIC | Age: 33
End: 2023-02-16

## 2023-02-24 DIAGNOSIS — K20.90 ESOPHAGITIS: Primary | ICD-10-CM

## 2023-02-24 RX ORDER — OMEPRAZOLE 20 MG/1
20 CAPSULE, DELAYED RELEASE ORAL
Qty: 90 CAPSULE | Refills: 1 | Status: SHIPPED | OUTPATIENT
Start: 2023-02-24

## 2023-04-21 DIAGNOSIS — J30.9 ALLERGIC RHINITIS, UNSPECIFIED: ICD-10-CM

## 2023-04-21 RX ORDER — FLUTICASONE PROPIONATE 50 MCG
SPRAY, SUSPENSION (ML) NASAL
Qty: 16 G | Refills: 3 | Status: SHIPPED | OUTPATIENT
Start: 2023-04-21

## 2023-04-27 ENCOUNTER — OFFICE VISIT (OUTPATIENT)
Age: 33
End: 2023-04-27

## 2023-04-27 VITALS — BODY MASS INDEX: 34.24 KG/M2 | WEIGHT: 244.6 LBS | HEIGHT: 71 IN | TEMPERATURE: 98.2 F

## 2023-04-27 DIAGNOSIS — M17.12 UNILATERAL PRIMARY OSTEOARTHRITIS, LEFT KNEE: Primary | ICD-10-CM

## 2023-04-27 RX ORDER — BETAMETHASONE SODIUM PHOSPHATE AND BETAMETHASONE ACETATE 3; 3 MG/ML; MG/ML
3 INJECTION, SUSPENSION INTRA-ARTICULAR; INTRALESIONAL; INTRAMUSCULAR; SOFT TISSUE ONCE
Status: COMPLETED | OUTPATIENT
Start: 2023-04-27 | End: 2023-04-27

## 2023-04-27 RX ADMIN — BETAMETHASONE SODIUM PHOSPHATE AND BETAMETHASONE ACETATE 3 MG: 3; 3 INJECTION, SUSPENSION INTRA-ARTICULAR; INTRALESIONAL; INTRAMUSCULAR; SOFT TISSUE at 11:42
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
## 2023-04-28 DIAGNOSIS — M17.12 UNILATERAL PRIMARY OSTEOARTHRITIS, LEFT KNEE: Primary | ICD-10-CM

## 2023-05-01 NOTE — PATIENT INSTRUCTIONS
Watch closely for changes in your health, and be sure to contact your doctor if:    Your blood pressure measures higher than your doctor recommends at least 2 times. That means the top number is higher or the bottom number is higher, or both. You think you may be having side effects from your blood pressure medicine. Where can you learn more? Go to http://www.woods.com/ and enter H919 to learn more about \"Acute High Blood Pressure: Care Instructions. \"  Current as of: September 7, 2022               Content Version: 13.5  © 2006-2022 [employer]. Care instructions adapted under license by [employer] ([facility]). If you have questions about a medical condition or this instruction, always ask your healthcare professional. [address] any warranty or liability for your use of this information. 4 Heart-Healthy Changes to Lower Blood Pressure (01:50)  Your health professional recommends that you watch this short online health video. Learn how healthy lifestyle changes can help lower your blood pressure. Purpose:  Outlines the lifestyle changes that can help lower blood pressure. Addresses the difficulty of making changes. Goal:  The user will be able to describe lifestyle changes that can help reduce high blood pressure. How to watch the video     Scan the QR code   OR Visit the website  [email or web address]. [email or web address]/[email or web address]   Current as of: October 6, 2021               Content Version: 13.5  © 2006-2022 [employer]. Care instructions adapted under license by [employer] ([facility]). If you have questions about a medical condition or this instruction, always ask your healthcare professional. [address] any warranty or liability for your use of this information. DASH Diet: Care Instructions  Your Care Instructions     The DASH diet is an eating plan that can help lower your blood pressure.  DASH stands for Dietary

## 2023-05-02 ENCOUNTER — OFFICE VISIT (OUTPATIENT)
Facility: CLINIC | Age: 33
End: 2023-05-02
Payer: COMMERCIAL

## 2023-05-02 VITALS
RESPIRATION RATE: 16 BRPM | OXYGEN SATURATION: 94 % | TEMPERATURE: 98.6 F | BODY MASS INDEX: 36.96 KG/M2 | HEIGHT: 71 IN | DIASTOLIC BLOOD PRESSURE: 106 MMHG | SYSTOLIC BLOOD PRESSURE: 152 MMHG | WEIGHT: 264 LBS | HEART RATE: 77 BPM

## 2023-05-02 DIAGNOSIS — M25.562 CHRONIC PAIN OF LEFT KNEE: ICD-10-CM

## 2023-05-02 DIAGNOSIS — Z11.59 ENCOUNTER FOR HEPATITIS C SCREENING TEST FOR LOW RISK PATIENT: ICD-10-CM

## 2023-05-02 DIAGNOSIS — G89.29 CHRONIC PAIN OF LEFT KNEE: ICD-10-CM

## 2023-05-02 DIAGNOSIS — F41.8 ANXIETY WITH DEPRESSION: ICD-10-CM

## 2023-05-02 DIAGNOSIS — Z13.220 SCREENING FOR CHOLESTEROL LEVEL: ICD-10-CM

## 2023-05-02 DIAGNOSIS — R03.0 ELEVATED BLOOD-PRESSURE READING, WITHOUT DIAGNOSIS OF HYPERTENSION: Primary | ICD-10-CM

## 2023-05-02 DIAGNOSIS — Z11.4 SCREENING FOR HIV (HUMAN IMMUNODEFICIENCY VIRUS): ICD-10-CM

## 2023-05-02 PROCEDURE — 99214 OFFICE O/P EST MOD 30 MIN: CPT | Performed by: EMERGENCY MEDICINE

## 2023-05-02 RX ORDER — LISINOPRIL 40 MG/1
40 TABLET ORAL DAILY
Qty: 90 TABLET | Refills: 1 | Status: SHIPPED | OUTPATIENT
Start: 2023-05-02 | End: 2023-05-04

## 2023-05-02 RX ORDER — HYDROCHLOROTHIAZIDE 25 MG/1
25 TABLET ORAL EVERY MORNING
Qty: 90 TABLET | Refills: 1 | Status: SHIPPED | OUTPATIENT
Start: 2023-05-02

## 2023-05-02 SDOH — ECONOMIC STABILITY: INCOME INSECURITY: HOW HARD IS IT FOR YOU TO PAY FOR THE VERY BASICS LIKE FOOD, HOUSING, MEDICAL CARE, AND HEATING?: VERY HARD

## 2023-05-02 SDOH — ECONOMIC STABILITY: FOOD INSECURITY: WITHIN THE PAST 12 MONTHS, THE FOOD YOU BOUGHT JUST DIDN'T LAST AND YOU DIDN'T HAVE MONEY TO GET MORE.: SOMETIMES TRUE

## 2023-05-02 SDOH — ECONOMIC STABILITY: FOOD INSECURITY: WITHIN THE PAST 12 MONTHS, YOU WORRIED THAT YOUR FOOD WOULD RUN OUT BEFORE YOU GOT MONEY TO BUY MORE.: SOMETIMES TRUE

## 2023-05-02 SDOH — ECONOMIC STABILITY: HOUSING INSECURITY
IN THE LAST 12 MONTHS, WAS THERE A TIME WHEN YOU DID NOT HAVE A STEADY PLACE TO SLEEP OR SLEPT IN A SHELTER (INCLUDING NOW)?: NO

## 2023-05-02 ASSESSMENT — PATIENT HEALTH QUESTIONNAIRE - PHQ9
2. FEELING DOWN, DEPRESSED OR HOPELESS: 0
SUM OF ALL RESPONSES TO PHQ QUESTIONS 1-9: 0
5. POOR APPETITE OR OVEREATING: 0
9. THOUGHTS THAT YOU WOULD BE BETTER OFF DEAD, OR OF HURTING YOURSELF: 0
10. IF YOU CHECKED OFF ANY PROBLEMS, HOW DIFFICULT HAVE THESE PROBLEMS MADE IT FOR YOU TO DO YOUR WORK, TAKE CARE OF THINGS AT HOME, OR GET ALONG WITH OTHER PEOPLE: 0
2. FEELING DOWN, DEPRESSED OR HOPELESS: 0
7. TROUBLE CONCENTRATING ON THINGS, SUCH AS READING THE NEWSPAPER OR WATCHING TELEVISION: 0
SUM OF ALL RESPONSES TO PHQ QUESTIONS 1-9: 0
1. LITTLE INTEREST OR PLEASURE IN DOING THINGS: 0
SUM OF ALL RESPONSES TO PHQ9 QUESTIONS 1 & 2: 0
4. FEELING TIRED OR HAVING LITTLE ENERGY: 0
SUM OF ALL RESPONSES TO PHQ9 QUESTIONS 1 & 2: 0
SUM OF ALL RESPONSES TO PHQ QUESTIONS 1-9: 0
6. FEELING BAD ABOUT YOURSELF - OR THAT YOU ARE A FAILURE OR HAVE LET YOURSELF OR YOUR FAMILY DOWN: 0
SUM OF ALL RESPONSES TO PHQ QUESTIONS 1-9: 0
8. MOVING OR SPEAKING SO SLOWLY THAT OTHER PEOPLE COULD HAVE NOTICED. OR THE OPPOSITE, BEING SO FIGETY OR RESTLESS THAT YOU HAVE BEEN MOVING AROUND A LOT MORE THAN USUAL: 0
7. TROUBLE CONCENTRATING ON THINGS, SUCH AS READING THE NEWSPAPER OR WATCHING TELEVISION: 0
6. FEELING BAD ABOUT YOURSELF - OR THAT YOU ARE A FAILURE OR HAVE LET YOURSELF OR YOUR FAMILY DOWN: 0
5. POOR APPETITE OR OVEREATING: 0
SUM OF ALL RESPONSES TO PHQ QUESTIONS 1-9: 0
4. FEELING TIRED OR HAVING LITTLE ENERGY: 0
9. THOUGHTS THAT YOU WOULD BE BETTER OFF DEAD, OR OF HURTING YOURSELF: 0
8. MOVING OR SPEAKING SO SLOWLY THAT OTHER PEOPLE COULD HAVE NOTICED. OR THE OPPOSITE, BEING SO FIGETY OR RESTLESS THAT YOU HAVE BEEN MOVING AROUND A LOT MORE THAN USUAL: 0
SUM OF ALL RESPONSES TO PHQ QUESTIONS 1-9: 0
3. TROUBLE FALLING OR STAYING ASLEEP: 0
3. TROUBLE FALLING OR STAYING ASLEEP: 0
1. LITTLE INTEREST OR PLEASURE IN DOING THINGS: 0
10. IF YOU CHECKED OFF ANY PROBLEMS, HOW DIFFICULT HAVE THESE PROBLEMS MADE IT FOR YOU TO DO YOUR WORK, TAKE CARE OF THINGS AT HOME, OR GET ALONG WITH OTHER PEOPLE: 0
SUM OF ALL RESPONSES TO PHQ QUESTIONS 1-9: 0
SUM OF ALL RESPONSES TO PHQ QUESTIONS 1-9: 0

## 2023-05-03 DIAGNOSIS — R03.0 ELEVATED BLOOD-PRESSURE READING, WITHOUT DIAGNOSIS OF HYPERTENSION: ICD-10-CM

## 2023-05-03 NOTE — TELEPHONE ENCOUNTER
Requested Prescriptions     Pending Prescriptions Disp Refills    lisinopril (PRINIVIL;ZESTRIL) 40 MG tablet 90 tablet 1     Sig: Take 1 tablet by mouth daily     Asking for an alternative request and prior author

## 2023-05-04 RX ORDER — LISINOPRIL 40 MG/1
40 TABLET ORAL DAILY
Qty: 90 TABLET | Refills: 1 | Status: SHIPPED | OUTPATIENT
Start: 2023-05-04

## 2023-07-20 ENCOUNTER — HOSPITAL ENCOUNTER (EMERGENCY)
Facility: HOSPITAL | Age: 33
Discharge: HOME | End: 2023-07-20
Attending: EMERGENCY MEDICINE
Payer: COMMERCIAL

## 2023-07-20 ENCOUNTER — APPOINTMENT (OUTPATIENT)
Facility: HOSPITAL | Age: 33
End: 2023-07-20
Payer: COMMERCIAL

## 2023-07-20 VITALS
OXYGEN SATURATION: 99 % | DIASTOLIC BLOOD PRESSURE: 100 MMHG | WEIGHT: 230 LBS | HEART RATE: 78 BPM | SYSTOLIC BLOOD PRESSURE: 146 MMHG | RESPIRATION RATE: 16 BRPM | BODY MASS INDEX: 32.2 KG/M2 | HEIGHT: 71 IN | TEMPERATURE: 98.3 F

## 2023-07-20 DIAGNOSIS — M25.562 ACUTE PAIN OF LEFT KNEE: Primary | ICD-10-CM

## 2023-07-20 DIAGNOSIS — R03.0 ELEVATED BLOOD PRESSURE READING: ICD-10-CM

## 2023-07-20 PROCEDURE — 73562 X-RAY EXAM OF KNEE 3: CPT

## 2023-07-20 PROCEDURE — 99283 EMERGENCY DEPT VISIT LOW MDM: CPT

## 2023-07-20 RX ORDER — BUPRENORPHINE 5 UG/H
PATCH TRANSDERMAL
COMMUNITY
Start: 2023-06-26

## 2023-07-20 ASSESSMENT — ENCOUNTER SYMPTOMS
DIARRHEA: 0
VOMITING: 0
ABDOMINAL PAIN: 0
NAUSEA: 0
SHORTNESS OF BREATH: 0

## 2023-07-20 NOTE — DISCHARGE INSTRUCTIONS
Take medication as prescribed. Follow-up with your primary care physician within 2 days for reassessment. Bring the results from this visit with you for their review. Return to the ED immediately for any new, worsening, or persistent symptoms, including fever, redness, swelling, or any other medical concerns.

## 2023-07-20 NOTE — ED TRIAGE NOTES
Patient to triage states he was sent over by his pain management doctor to have x-rays of his left knee. Denies any recent injuries. Reports h/o left ACL tear 5 years ago with repair and continues to have pain.

## 2023-07-20 NOTE — ED PROVIDER NOTES
EMERGENCY DEPARTMENT HISTORY AND PHYSICAL EXAM    2:35 PM      Date: 7/20/2023  Patient Name: Leela Ochoa. History of Presenting Illness     Chief Complaint   Patient presents with    Knee Pain         History Provided By: Patient    Additional History (Context): Leela Sanderson is a 34 y.o. male with   Past Medical History:   Diagnosis Date    Arthritis    } who presents with complaint of acute on chronic left knee pain x days. Pt notes his pain management specialist sent him to the ED for x-rays. Patient notes left ACL tear 5 years ago with repair. Notes concern that \"screws moved\". Pt denies fever or chills, leg swelling or discoloration, fall or trauma, numbness or tingling. Notes he is chronically on tramadol and gabapentin for symptoms. PCP: Montrell Qureshi MD    No current facility-administered medications for this encounter. Current Outpatient Medications   Medication Sig Dispense Refill    buprenorphine (BUPRENEX) 5 MCG/HR PTWK APPLY 1 PATCH BY TRANSDERMAL ROUTE EVERY WEEK FOR 28 DAYS.  6/20/23      lisinopril (PRINIVIL;ZESTRIL) 40 MG tablet Take 1 tablet by mouth daily 90 tablet 1    hydroCHLOROthiazide (HYDRODIURIL) 25 MG tablet Take 1 tablet by mouth every morning 90 tablet 1    fluticasone (FLONASE) 50 MCG/ACT nasal spray USE ONE SQUIRT IN EACH NOSTRIL DAILY 16 g 3    omeprazole (PRILOSEC) 20 MG delayed release capsule Take 1 capsule by mouth every morning (before breakfast) 90 capsule 1    clonazePAM (KLONOPIN) 0.5 MG tablet ceived the following from [facility]: Outside name: clonazePAM (KlonoPIN) 0.5 mg tablet      lisinopril-hydroCHLOROthiazide (PRINZIDE;ZESTORETIC) 20-25 MG per tablet TAKE 1 TABLET BY MOUTH EVERY DAY      naproxen (NAPROSYN) 500 MG tablet Take 500 mg by mouth 2 times daily (with meals) (Patient not taking: Reported on 5/2/2023)      pregabalin (LYRICA) 150 MG capsule ceived the following from [facility]: Outside name:

## 2023-08-02 ENCOUNTER — TELEPHONE (OUTPATIENT)
Facility: CLINIC | Age: 33
End: 2023-08-02

## 2023-08-02 NOTE — TELEPHONE ENCOUNTER
Prior Auth for pt medication Lisinopril HCTZ has been submitted today 08/02/2023 may take up to 72 hours for approval or denial.

## 2023-09-05 ENCOUNTER — HOSPITAL ENCOUNTER (OUTPATIENT)
Facility: HOSPITAL | Age: 33
Setting detail: SPECIMEN
Discharge: HOME | End: 2023-09-08
Payer: COMMERCIAL

## 2023-09-05 DIAGNOSIS — Z11.59 ENCOUNTER FOR HEPATITIS C SCREENING TEST FOR LOW RISK PATIENT: ICD-10-CM

## 2023-09-05 DIAGNOSIS — R03.0 ELEVATED BLOOD-PRESSURE READING, WITHOUT DIAGNOSIS OF HYPERTENSION: ICD-10-CM

## 2023-09-05 DIAGNOSIS — Z11.4 SCREENING FOR HIV (HUMAN IMMUNODEFICIENCY VIRUS): ICD-10-CM

## 2023-09-05 DIAGNOSIS — Z13.220 SCREENING FOR CHOLESTEROL LEVEL: ICD-10-CM

## 2023-09-05 LAB
ALBUMIN SERPL-MCNC: 3.5 G/DL (ref 3.4–5)
ALBUMIN/GLOB SERPL: 0.9 (ref 0.8–1.7)
ALP SERPL-CCNC: 65 U/L (ref 45–117)
ALT SERPL-CCNC: 55 U/L (ref 16–61)
ANION GAP SERPL CALC-SCNC: 6 MMOL/L (ref 3–18)
AST SERPL-CCNC: 22 U/L (ref 10–38)
BASOPHILS # BLD: 0.1 K/UL (ref 0–0.1)
BASOPHILS NFR BLD: 0 % (ref 0–2)
BILIRUB SERPL-MCNC: 0.4 MG/DL (ref 0.2–1)
BUN SERPL-MCNC: 10 MG/DL (ref 7–18)
BUN/CREAT SERPL: 11 (ref 12–20)
CALCIUM SERPL-MCNC: 9.1 MG/DL (ref 8.5–10.1)
CHLORIDE SERPL-SCNC: 104 MMOL/L (ref 100–111)
CHOLEST SERPL-MCNC: 173 MG/DL
CO2 SERPL-SCNC: 29 MMOL/L (ref 21–32)
CREAT SERPL-MCNC: 0.87 MG/DL (ref 0.6–1.3)
DIFFERENTIAL METHOD BLD: ABNORMAL
EOSINOPHIL # BLD: 0.2 K/UL (ref 0–0.4)
EOSINOPHIL NFR BLD: 2 % (ref 0–5)
ERYTHROCYTE [DISTWIDTH] IN BLOOD BY AUTOMATED COUNT: 13.8 % (ref 11.6–14.5)
GLOBULIN SER CALC-MCNC: 4.1 G/DL (ref 2–4)
GLUCOSE SERPL-MCNC: 177 MG/DL (ref 74–99)
HCT VFR BLD AUTO: 44.2 % (ref 36–48)
HDLC SERPL-MCNC: 77 MG/DL (ref 40–60)
HDLC SERPL: 2.2 (ref 0–5)
HGB BLD-MCNC: 13.7 G/DL (ref 13–16)
IMM GRANULOCYTES # BLD AUTO: 0 K/UL (ref 0–0.04)
IMM GRANULOCYTES NFR BLD AUTO: 0 % (ref 0–0.5)
LDLC SERPL CALC-MCNC: 81.8 MG/DL (ref 0–100)
LIPID PANEL: ABNORMAL
LYMPHOCYTES # BLD: 2.2 K/UL (ref 0.9–3.6)
LYMPHOCYTES NFR BLD: 19 % (ref 21–52)
MCH RBC QN AUTO: 28.2 PG (ref 24–34)
MCHC RBC AUTO-ENTMCNC: 31 G/DL (ref 31–37)
MCV RBC AUTO: 90.9 FL (ref 78–100)
MONOCYTES # BLD: 0.7 K/UL (ref 0.05–1.2)
MONOCYTES NFR BLD: 6 % (ref 3–10)
NEUTS SEG # BLD: 8.1 K/UL (ref 1.8–8)
NEUTS SEG NFR BLD: 72 % (ref 40–73)
NRBC # BLD: 0 K/UL (ref 0–0.01)
NRBC BLD-RTO: 0 PER 100 WBC
PLATELET # BLD AUTO: 179 K/UL (ref 135–420)
PMV BLD AUTO: 12.1 FL (ref 9.2–11.8)
POTASSIUM SERPL-SCNC: 4.1 MMOL/L (ref 3.5–5.5)
PROT SERPL-MCNC: 7.6 G/DL (ref 6.4–8.2)
RBC # BLD AUTO: 4.86 M/UL (ref 4.35–5.65)
SODIUM SERPL-SCNC: 139 MMOL/L (ref 136–145)
TRIGL SERPL-MCNC: 71 MG/DL
VLDLC SERPL CALC-MCNC: 14.2 MG/DL
WBC # BLD AUTO: 11.3 K/UL (ref 4.6–13.2)

## 2023-09-05 PROCEDURE — 80061 LIPID PANEL: CPT

## 2023-09-05 PROCEDURE — 87389 HIV-1 AG W/HIV-1&-2 AB AG IA: CPT

## 2023-09-05 PROCEDURE — 36415 COLL VENOUS BLD VENIPUNCTURE: CPT

## 2023-09-05 PROCEDURE — 86803 HEPATITIS C AB TEST: CPT

## 2023-09-05 PROCEDURE — 80053 COMPREHEN METABOLIC PANEL: CPT

## 2023-09-05 PROCEDURE — 85025 COMPLETE CBC W/AUTO DIFF WBC: CPT

## 2023-09-06 LAB
HCV AB SER IA-ACNC: 0.08 INDEX
HCV AB SERPL QL IA: NEGATIVE
HEPATITIS C COMMENT: NORMAL
HIV 1+2 AB+HIV1 P24 AG SERPL QL IA: NONREACTIVE
HIV 1/2 RESULT COMMENT: NORMAL

## 2023-09-10 NOTE — PROGRESS NOTES
Assessment/Plan  Visit Diagnoses and Associated Orders       Essential hypertension   (Stable)  -  Primary    Clarified should only be on Prinzide this is a chronic problem. Slight elevation. He is increasing his exercise and we talked about diet. Basic Metabolic Panel [[id number] Custom]   - Future Order    CBC with Auto Differential [id number] Custom]   - Future Order         Anxiety with depression        Continue present treatment. Chronic pain of left knee        Followed by orthopedics and pain management         Class 1 obesity due to excess calories without serious comorbidity with body mass index (BMI) of 32.0 to 32.9 in adult        Discussed diet and exercise         Esophagitis        No symptoms at this time no change in treatment    omeprazole (PRILOSEC) 20 MG delayed release capsule [[id number]]           Elevated random blood glucose level        Concerning. We will check hemoglobin A1c on next visit. Microalbumin / Creatinine Urine Ratio [[id number] Custom]   - Future Order    Hemoglobin A1C [[id number] Custom]   - Future Order         Screening for cholesterol level        Lipid panel ordered. Lipid Panel [[id number] Custom]   - Future Order         ORDERS WITHOUT AN ASSOCIATED DIAGNOSIS    lisinopril (PRINIVIL;ZESTRIL) 40 MG tablet [[id number]]          Labs and diagnostics:  9/5/2023:  Random glucose 177   CBC unremarkable HIV nonreactive  Cholesterol 173 LDL 81    RESULTS REVIEW: 17  I would like the patient to follow-up in my office in  6 months    F/ULABS/DIAGNOSTICS before next visit hemoglobin     Striae follow-up lab no symptoms at this time. Continue present    reviewed diet, exercise and weight control  Lab results, diagnostic and radiologic results and schedule of future  studies reviewed with  the patient  All questions were answered and understood.   Health Maintenance Due   Topic Date Due    Hepatitis B vaccine (1 of 3 - 3-dose series) Never done    COVID-19 Vaccine (1) Never done

## 2023-09-12 ENCOUNTER — OFFICE VISIT (OUTPATIENT)
Facility: CLINIC | Age: 33
End: 2023-09-12
Payer: COMMERCIAL

## 2023-09-12 VITALS
BODY MASS INDEX: 33.46 KG/M2 | DIASTOLIC BLOOD PRESSURE: 94 MMHG | OXYGEN SATURATION: 97 % | WEIGHT: 239 LBS | SYSTOLIC BLOOD PRESSURE: 137 MMHG | HEART RATE: 78 BPM | HEIGHT: 71 IN | RESPIRATION RATE: 18 BRPM

## 2023-09-12 DIAGNOSIS — Z13.220 SCREENING FOR CHOLESTEROL LEVEL: ICD-10-CM

## 2023-09-12 DIAGNOSIS — F41.8 ANXIETY WITH DEPRESSION: ICD-10-CM

## 2023-09-12 DIAGNOSIS — R73.09 ELEVATED RANDOM BLOOD GLUCOSE LEVEL: ICD-10-CM

## 2023-09-12 DIAGNOSIS — G89.29 CHRONIC PAIN OF LEFT KNEE: ICD-10-CM

## 2023-09-12 DIAGNOSIS — M25.562 CHRONIC PAIN OF LEFT KNEE: ICD-10-CM

## 2023-09-12 DIAGNOSIS — E66.09 CLASS 1 OBESITY DUE TO EXCESS CALORIES WITHOUT SERIOUS COMORBIDITY WITH BODY MASS INDEX (BMI) OF 32.0 TO 32.9 IN ADULT: ICD-10-CM

## 2023-09-12 DIAGNOSIS — K20.90 ESOPHAGITIS: ICD-10-CM

## 2023-09-12 DIAGNOSIS — I10 ESSENTIAL HYPERTENSION: Primary | ICD-10-CM

## 2023-09-12 PROCEDURE — 3080F DIAST BP >= 90 MM HG: CPT | Performed by: EMERGENCY MEDICINE

## 2023-09-12 PROCEDURE — 3074F SYST BP LT 130 MM HG: CPT | Performed by: EMERGENCY MEDICINE

## 2023-09-12 PROCEDURE — 99214 OFFICE O/P EST MOD 30 MIN: CPT | Performed by: EMERGENCY MEDICINE

## 2023-09-12 RX ORDER — OMEPRAZOLE 20 MG/1
20 CAPSULE, DELAYED RELEASE ORAL
Qty: 90 CAPSULE | Refills: 1 | Status: SHIPPED | OUTPATIENT
Start: 2023-09-12

## 2023-09-12 RX ORDER — LISINOPRIL 40 MG/1
40 TABLET ORAL DAILY
Qty: 90 TABLET | Refills: 1 | Status: SHIPPED | OUTPATIENT
Start: 2023-09-12

## 2023-09-12 SDOH — ECONOMIC STABILITY: INCOME INSECURITY: HOW HARD IS IT FOR YOU TO PAY FOR THE VERY BASICS LIKE FOOD, HOUSING, MEDICAL CARE, AND HEATING?: NOT VERY HARD

## 2023-09-12 SDOH — ECONOMIC STABILITY: FOOD INSECURITY: WITHIN THE PAST 12 MONTHS, THE FOOD YOU BOUGHT JUST DIDN'T LAST AND YOU DIDN'T HAVE MONEY TO GET MORE.: NEVER TRUE

## 2023-09-12 SDOH — ECONOMIC STABILITY: FOOD INSECURITY: WITHIN THE PAST 12 MONTHS, YOU WORRIED THAT YOUR FOOD WOULD RUN OUT BEFORE YOU GOT MONEY TO BUY MORE.: NEVER TRUE

## 2023-09-12 ASSESSMENT — PATIENT HEALTH QUESTIONNAIRE - PHQ9
SUM OF ALL RESPONSES TO PHQ QUESTIONS 1-9: 0
7. TROUBLE CONCENTRATING ON THINGS, SUCH AS READING THE NEWSPAPER OR WATCHING TELEVISION: 0
2. FEELING DOWN, DEPRESSED OR HOPELESS: 0
SUM OF ALL RESPONSES TO PHQ QUESTIONS 1-9: 0
1. LITTLE INTEREST OR PLEASURE IN DOING THINGS: 0
SUM OF ALL RESPONSES TO PHQ QUESTIONS 1-9: 0
4. FEELING TIRED OR HAVING LITTLE ENERGY: 0
SUM OF ALL RESPONSES TO PHQ9 QUESTIONS 1 & 2: 0
10. IF YOU CHECKED OFF ANY PROBLEMS, HOW DIFFICULT HAVE THESE PROBLEMS MADE IT FOR YOU TO DO YOUR WORK, TAKE CARE OF THINGS AT HOME, OR GET ALONG WITH OTHER PEOPLE: 0
8. MOVING OR SPEAKING SO SLOWLY THAT OTHER PEOPLE COULD HAVE NOTICED. OR THE OPPOSITE, BEING SO FIGETY OR RESTLESS THAT YOU HAVE BEEN MOVING AROUND A LOT MORE THAN USUAL: 0
3. TROUBLE FALLING OR STAYING ASLEEP: 0
5. POOR APPETITE OR OVEREATING: 0
9. THOUGHTS THAT YOU WOULD BE BETTER OFF DEAD, OR OF HURTING YOURSELF: 0
6. FEELING BAD ABOUT YOURSELF - OR THAT YOU ARE A FAILURE OR HAVE LET YOURSELF OR YOUR FAMILY DOWN: 0
SUM OF ALL RESPONSES TO PHQ QUESTIONS 1-9: 0

## 2023-09-12 ASSESSMENT — ANXIETY QUESTIONNAIRES
7. FEELING AFRAID AS IF SOMETHING AWFUL MIGHT HAPPEN: 0
6. BECOMING EASILY ANNOYED OR IRRITABLE: 0
3. WORRYING TOO MUCH ABOUT DIFFERENT THINGS: 0
1. FEELING NERVOUS, ANXIOUS, OR ON EDGE: 0
IF YOU CHECKED OFF ANY PROBLEMS ON THIS QUESTIONNAIRE, HOW DIFFICULT HAVE THESE PROBLEMS MADE IT FOR YOU TO DO YOUR WORK, TAKE CARE OF THINGS AT HOME, OR GET ALONG WITH OTHER PEOPLE: NOT DIFFICULT AT ALL
5. BEING SO RESTLESS THAT IT IS HARD TO SIT STILL: 0
2. NOT BEING ABLE TO STOP OR CONTROL WORRYING: 0
GAD7 TOTAL SCORE: 0
4. TROUBLE RELAXING: 0

## 2023-09-22 ENCOUNTER — OFFICE VISIT (OUTPATIENT)
Age: 33
End: 2023-09-22

## 2023-09-22 VITALS — TEMPERATURE: 97.8 F | WEIGHT: 241.4 LBS | BODY MASS INDEX: 33.8 KG/M2 | HEIGHT: 71 IN

## 2023-09-22 DIAGNOSIS — M17.12 UNILATERAL PRIMARY OSTEOARTHRITIS, LEFT KNEE: Primary | ICD-10-CM

## 2023-09-22 RX ORDER — PRAZOSIN HYDROCHLORIDE 1 MG/1
CAPSULE ORAL
COMMUNITY
Start: 2023-07-21

## 2023-09-22 RX ORDER — BETAMETHASONE SODIUM PHOSPHATE AND BETAMETHASONE ACETATE 3; 3 MG/ML; MG/ML
3 INJECTION, SUSPENSION INTRA-ARTICULAR; INTRALESIONAL; INTRAMUSCULAR; SOFT TISSUE ONCE
Status: COMPLETED | OUTPATIENT
Start: 2023-09-22 | End: 2023-09-22

## 2023-09-22 RX ORDER — AMOXICILLIN 500 MG/1
500 CAPSULE ORAL 3 TIMES DAILY
COMMUNITY
Start: 2023-09-05

## 2023-09-22 RX ADMIN — BETAMETHASONE SODIUM PHOSPHATE AND BETAMETHASONE ACETATE 3 MG: 3; 3 INJECTION, SUSPENSION INTRA-ARTICULAR; INTRALESIONAL; INTRAMUSCULAR; SOFT TISSUE at 11:28
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
## 2023-10-07 ENCOUNTER — HOSPITAL ENCOUNTER (EMERGENCY)
Facility: HOSPITAL | Age: 33
Discharge: ANOTHER HOSPITAL | End: 2023-10-07
Attending: STUDENT IN AN ORGANIZED HEALTH CARE EDUCATION/TRAINING PROGRAM
Payer: COMMERCIAL

## 2023-10-07 VITALS
DIASTOLIC BLOOD PRESSURE: 114 MMHG | HEIGHT: 69 IN | BODY MASS INDEX: 34.8 KG/M2 | TEMPERATURE: 97.9 F | HEART RATE: 100 BPM | RESPIRATION RATE: 21 BRPM | SYSTOLIC BLOOD PRESSURE: 175 MMHG | WEIGHT: 235 LBS | OXYGEN SATURATION: 100 %

## 2023-10-07 DIAGNOSIS — T30.0 BURN: Primary | ICD-10-CM

## 2023-10-07 LAB
ALBUMIN SERPL-MCNC: 3.9 G/DL (ref 3.4–5)
ALBUMIN/GLOB SERPL: 0.8 (ref 0.8–1.7)
ALP SERPL-CCNC: 62 U/L (ref 45–117)
ALT SERPL-CCNC: 45 U/L (ref 16–61)
ANION GAP SERPL CALC-SCNC: 10 MMOL/L (ref 3–18)
AST SERPL-CCNC: 26 U/L (ref 10–38)
BASOPHILS # BLD: 0 K/UL (ref 0–0.1)
BASOPHILS NFR BLD: 0 % (ref 0–2)
BILIRUB SERPL-MCNC: 0.2 MG/DL (ref 0.2–1)
BUN SERPL-MCNC: 17 MG/DL (ref 7–18)
BUN/CREAT SERPL: 15 (ref 12–20)
CALCIUM SERPL-MCNC: 8.8 MG/DL (ref 8.5–10.1)
CHLORIDE SERPL-SCNC: 104 MMOL/L (ref 100–111)
CO2 SERPL-SCNC: 23 MMOL/L (ref 21–32)
CREAT SERPL-MCNC: 1.12 MG/DL (ref 0.6–1.3)
DIFFERENTIAL METHOD BLD: ABNORMAL
EOSINOPHIL # BLD: 0 K/UL (ref 0–0.4)
EOSINOPHIL NFR BLD: 0 % (ref 0–5)
ERYTHROCYTE [DISTWIDTH] IN BLOOD BY AUTOMATED COUNT: 13.5 % (ref 11.6–14.5)
ETHANOL SERPL-MCNC: 182 MG/DL (ref 0–3)
GLOBULIN SER CALC-MCNC: 4.6 G/DL (ref 2–4)
GLUCOSE SERPL-MCNC: 212 MG/DL (ref 74–99)
HCT VFR BLD AUTO: 41.8 % (ref 36–48)
HGB BLD-MCNC: 13.9 G/DL (ref 13–16)
IMM GRANULOCYTES # BLD AUTO: 0 K/UL (ref 0–0.04)
IMM GRANULOCYTES NFR BLD AUTO: 0 % (ref 0–0.5)
INR PPP: 1 (ref 0.9–1.1)
LYMPHOCYTES # BLD: 9 K/UL (ref 0.9–3.6)
LYMPHOCYTES NFR BLD: 75 % (ref 21–52)
MAGNESIUM SERPL-MCNC: 1.9 MG/DL (ref 1.6–2.6)
MCH RBC QN AUTO: 29 PG (ref 24–34)
MCHC RBC AUTO-ENTMCNC: 33.3 G/DL (ref 31–37)
MCV RBC AUTO: 87.3 FL (ref 78–100)
MONOCYTES # BLD: 0.4 K/UL (ref 0.05–1.2)
MONOCYTES NFR BLD: 3 % (ref 3–10)
NEUTS SEG # BLD: 2.7 K/UL (ref 1.8–8)
NEUTS SEG NFR BLD: 22 % (ref 40–73)
NRBC # BLD: 0 K/UL (ref 0–0.01)
NRBC BLD-RTO: 0 PER 100 WBC
PLATELET # BLD AUTO: 191 K/UL (ref 135–420)
PLATELET COMMENT: ABNORMAL
PMV BLD AUTO: 10.6 FL (ref 9.2–11.8)
POTASSIUM SERPL-SCNC: 3.8 MMOL/L (ref 3.5–5.5)
PROT SERPL-MCNC: 8.5 G/DL (ref 6.4–8.2)
PROTHROMBIN TIME: 13 SEC (ref 11.9–14.7)
RBC # BLD AUTO: 4.79 M/UL (ref 4.35–5.65)
RBC MORPH BLD: ABNORMAL
SODIUM SERPL-SCNC: 137 MMOL/L (ref 136–145)
WBC # BLD AUTO: 12.1 K/UL (ref 4.6–13.2)

## 2023-10-07 PROCEDURE — 80053 COMPREHEN METABOLIC PANEL: CPT

## 2023-10-07 PROCEDURE — 99285 EMERGENCY DEPT VISIT HI MDM: CPT

## 2023-10-07 PROCEDURE — 85025 COMPLETE CBC W/AUTO DIFF WBC: CPT

## 2023-10-07 PROCEDURE — 96374 THER/PROPH/DIAG INJ IV PUSH: CPT

## 2023-10-07 PROCEDURE — 85610 PROTHROMBIN TIME: CPT

## 2023-10-07 PROCEDURE — 2580000003 HC RX 258: Performed by: STUDENT IN AN ORGANIZED HEALTH CARE EDUCATION/TRAINING PROGRAM

## 2023-10-07 PROCEDURE — 2500000003 HC RX 250 WO HCPCS: Performed by: STUDENT IN AN ORGANIZED HEALTH CARE EDUCATION/TRAINING PROGRAM

## 2023-10-07 PROCEDURE — 82077 ASSAY SPEC XCP UR&BREATH IA: CPT

## 2023-10-07 PROCEDURE — 83735 ASSAY OF MAGNESIUM: CPT

## 2023-10-07 RX ORDER — ACETAMINOPHEN 500 MG
1000 TABLET ORAL
Status: DISCONTINUED | OUTPATIENT
Start: 2023-10-07 | End: 2023-10-07 | Stop reason: HOSPADM

## 2023-10-07 RX ORDER — KETOROLAC TROMETHAMINE 30 MG/ML
15 INJECTION, SOLUTION INTRAMUSCULAR; INTRAVENOUS ONCE
Status: DISCONTINUED | OUTPATIENT
Start: 2023-10-07 | End: 2023-10-07 | Stop reason: HOSPADM

## 2023-10-07 RX ORDER — HYDROMORPHONE HYDROCHLORIDE 1 MG/ML
1 INJECTION, SOLUTION INTRAMUSCULAR; INTRAVENOUS; SUBCUTANEOUS
Status: COMPLETED | OUTPATIENT
Start: 2023-10-07 | End: 2023-10-07

## 2023-10-07 RX ORDER — SODIUM CHLORIDE, SODIUM LACTATE, POTASSIUM CHLORIDE, AND CALCIUM CHLORIDE .6; .31; .03; .02 G/100ML; G/100ML; G/100ML; G/100ML
1000 INJECTION, SOLUTION INTRAVENOUS ONCE
Status: COMPLETED | OUTPATIENT
Start: 2023-10-07 | End: 2023-10-07

## 2023-10-07 RX ADMIN — HYDROMORPHONE HYDROCHLORIDE 1 MG: 1 INJECTION, SOLUTION INTRAMUSCULAR; INTRAVENOUS; SUBCUTANEOUS at 03:44

## 2023-10-07 RX ADMIN — SODIUM CHLORIDE, POTASSIUM CHLORIDE, SODIUM LACTATE AND CALCIUM CHLORIDE 1000 ML: 600; 310; 30; 20 INJECTION, SOLUTION INTRAVENOUS at 03:46

## 2023-10-07 ASSESSMENT — PAIN SCALES - GENERAL
PAINLEVEL_OUTOF10: 10
PAINLEVEL_OUTOF10: 10

## 2023-10-07 ASSESSMENT — PAIN - FUNCTIONAL ASSESSMENT: PAIN_FUNCTIONAL_ASSESSMENT: 0-10
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
## 2023-10-07 NOTE — ED NOTES
Report given to [facility] ER RN, Regina Joshua, and transport team.     Yu Freeman RN  10/07/23 3497

## 2023-10-30 ENCOUNTER — TELEPHONE (OUTPATIENT)
Facility: CLINIC | Age: 33
End: 2023-10-30
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
## 2023-11-01 DIAGNOSIS — T22.20XS: Primary | ICD-10-CM

## 2023-11-01 RX ORDER — BISMUTH TRIBROMOPH/PETROLATUM 4" X 4"
1 BANDAGE TOPICAL DAILY
Qty: 50 EACH | Refills: 3 | Status: SHIPPED | OUTPATIENT
Start: 2023-11-01

## 2023-11-01 NOTE — TELEPHONE ENCOUNTER
TC was made to pt and pt states \" that he changes his dressing once a day and he need size 4 X 9 gauze

## 2024-01-10 DIAGNOSIS — K20.90 ESOPHAGITIS: ICD-10-CM

## 2024-01-11 RX ORDER — OMEPRAZOLE 20 MG/1
20 CAPSULE, DELAYED RELEASE ORAL
Qty: 90 CAPSULE | Refills: 3 | Status: SHIPPED | OUTPATIENT
Start: 2024-01-11 | End: 2024-03-12 | Stop reason: SDUPTHER

## 2024-03-11 ASSESSMENT — ENCOUNTER SYMPTOMS
EYE REDNESS: 0
SORE THROAT: 0
COLOR CHANGE: 0
CONSTIPATION: 0
WHEEZING: 0
SHORTNESS OF BREATH: 0
EYE ITCHING: 0
ABDOMINAL PAIN: 0
DIARRHEA: 0
EYE DISCHARGE: 0
APNEA: 0
COUGH: 0
BLOOD IN STOOL: 0

## 2024-03-11 NOTE — PROGRESS NOTES
10/07/2023 1.9  1.6 - 2.6 mg/dL Final    Ethanol Lvl 10/07/2023 182 (H)  0 - 3 MG/DL Final     reviewed diet, exercise and weight control  Wt Readings from Last 3 Encounters:   03/12/24 112 kg (247 lb)   10/07/23 106.6 kg (235 lb)   09/22/23 109.5 kg (241 lb 6.4 oz)     We discussed continued use of this PPI.  We discussed his present symptoms.  The patient is aware that they could cut back or stop the medication if the symptoms resolve.  The patient is aware of the side effects of continued acid suppression.  The patient is aware of the possible side effects of renal impairment and tubulointerstitial nephritis as well as pancreatitis, and hepatic impairment.  Lab results, diagnostic and radiologic results and schedule of future  studies reviewed with  the patient  All questions were answered and understood.  F/ULABS/DIAGNOSTICS this visit.  Pending     4 months  Health Maintenance Due   Topic Date Due    Hepatitis B vaccine (1 of 3 - 3-dose series) Never done    COVID-19 Vaccine (1) Never done    Varicella vaccine (1 of 2 - 2-dose childhood series) Never done    Flu vaccine (1) Never done     Current Outpatient Medications   Medication Sig    fluticasone (FLONASE) 50 MCG/ACT nasal spray USE ONE SQUIRT IN EACH NOSTRIL DAILY    omeprazole (PRILOSEC) 20 MG delayed release capsule Take 1 capsule by mouth every morning (before breakfast)    lisinopril-hydroCHLOROthiazide (PRINZIDE;ZESTORETIC) 20-25 MG per tablet Take 1 tablet by mouth daily    Bismuth Tribromoph-Petrolatum (XEROFORM PETROLATUM DRES 5\"X9\") 3 % PADS Apply 1 patch topically daily    prazosin (MINIPRESS) 1 MG capsule TAKE 1 CAPSULE BY MOUTH AT EVERY BEDTIME.    buprenorphine (BUPRENEX) 5 MCG/HR PTWK APPLY 1 PATCH BY TRANSDERMAL ROUTE EVERY WEEK FOR 28 DAYS. 6/20/23    hydroCHLOROthiazide (HYDRODIURIL) 25 MG tablet Take 1 tablet by mouth every morning    clonazePAM (KLONOPIN) 0.5 MG tablet ceived the following from [facility]: Outside name:

## 2024-03-11 NOTE — PATIENT INSTRUCTIONS
adapted under license by [employer]. If you have questions about a medical condition or this instruction, always ask your healthcare professional. [employer] disclaims any warranty or liability for your use of this information.           Ergonomics: Using Your Computer (03:06)  Your health professional recommends that you watch this short online health video.  Get tips on how to set up and use your computer in a way that prevents aches and pains.  Purpose:  Demonstrates how to set up and use a computer in a safe way to prevent injury.  Goal:  Users will understand how to set up and use a computer safely to prevent aches and pains.      How to watch the video     Scan the QR code   OR Visit the website    [email or web address] [email or web address]   Current as of: November 14, 2022               Content Version: 13.6  © 2006-2023 [employer].   Care instructions adapted under license by [employer]. If you have questions about a medical condition or this instruction, always ask your healthcare professional. [employer] disclaims any warranty or liability for your use of this information.               Joint Pain: Care Instructions  Your Care Instructions     Many people have small aches and pains from overuse or injury to muscles and joints. Joint injuries often happen during sports or recreation, work tasks, or projects around the home. An overuse injury can happen when you put too much stress on a joint or when you do an activity that stresses the joint over and over, such as using the computer or rowing a boat.  You can take action at home to help your muscles and joints get better. You should feel better in 1 to 2 weeks, but it can take 3 months or more to heal completely.  Follow-up care is a key part of your treatment and safety. Be sure to make and go to all appointments, and call your doctor if you are having problems. It's also a good idea to know your test results and keep a

## 2024-03-12 ENCOUNTER — HOSPITAL ENCOUNTER (OUTPATIENT)
Facility: HOSPITAL | Age: 34
Setting detail: SPECIMEN
Discharge: HOME | End: 2024-03-15
Payer: COMMERCIAL

## 2024-03-12 ENCOUNTER — OFFICE VISIT (OUTPATIENT)
Facility: CLINIC | Age: 34
End: 2024-03-12
Payer: COMMERCIAL

## 2024-03-12 VITALS
SYSTOLIC BLOOD PRESSURE: 138 MMHG | HEIGHT: 69 IN | RESPIRATION RATE: 16 BRPM | WEIGHT: 247 LBS | HEART RATE: 80 BPM | DIASTOLIC BLOOD PRESSURE: 103 MMHG | BODY MASS INDEX: 36.58 KG/M2 | OXYGEN SATURATION: 95 %

## 2024-03-12 DIAGNOSIS — K20.90 ESOPHAGITIS: ICD-10-CM

## 2024-03-12 DIAGNOSIS — R73.09 ELEVATED RANDOM BLOOD GLUCOSE LEVEL: ICD-10-CM

## 2024-03-12 DIAGNOSIS — J30.2 SEASONAL ALLERGIC RHINITIS DUE TO FUNGAL SPORES: ICD-10-CM

## 2024-03-12 DIAGNOSIS — M25.562 CHRONIC PAIN OF LEFT KNEE: Primary | ICD-10-CM

## 2024-03-12 DIAGNOSIS — I10 ESSENTIAL HYPERTENSION: ICD-10-CM

## 2024-03-12 DIAGNOSIS — F41.8 ANXIETY WITH DEPRESSION: ICD-10-CM

## 2024-03-12 DIAGNOSIS — E66.09 CLASS 1 OBESITY DUE TO EXCESS CALORIES WITHOUT SERIOUS COMORBIDITY WITH BODY MASS INDEX (BMI) OF 32.0 TO 32.9 IN ADULT: ICD-10-CM

## 2024-03-12 DIAGNOSIS — Z87.828 HISTORY OF BURN, THIRD DEGREE: ICD-10-CM

## 2024-03-12 DIAGNOSIS — Z13.220 SCREENING FOR CHOLESTEROL LEVEL: ICD-10-CM

## 2024-03-12 DIAGNOSIS — G89.29 CHRONIC PAIN OF LEFT KNEE: Primary | ICD-10-CM

## 2024-03-12 LAB
ANION GAP SERPL CALC-SCNC: 6 MMOL/L (ref 3–18)
BASOPHILS # BLD: 0 K/UL (ref 0–0.1)
BASOPHILS NFR BLD: 1 % (ref 0–2)
BUN SERPL-MCNC: 13 MG/DL (ref 7–18)
BUN/CREAT SERPL: 16 (ref 12–20)
CALCIUM SERPL-MCNC: 9.1 MG/DL (ref 8.5–10.1)
CHLORIDE SERPL-SCNC: 107 MMOL/L (ref 100–111)
CHOLEST SERPL-MCNC: 165 MG/DL
CO2 SERPL-SCNC: 28 MMOL/L (ref 21–32)
CREAT SERPL-MCNC: 0.79 MG/DL (ref 0.6–1.3)
CREAT UR-MCNC: 177 MG/DL (ref 30–125)
DIFFERENTIAL METHOD BLD: ABNORMAL
EOSINOPHIL # BLD: 0.2 K/UL (ref 0–0.4)
EOSINOPHIL NFR BLD: 3 % (ref 0–5)
ERYTHROCYTE [DISTWIDTH] IN BLOOD BY AUTOMATED COUNT: 15.8 % (ref 11.6–14.5)
EST. AVERAGE GLUCOSE BLD GHB EST-MCNC: 137 MG/DL
GLUCOSE SERPL-MCNC: 128 MG/DL (ref 74–99)
HBA1C MFR BLD: 6.4 % (ref 4.2–5.6)
HCT VFR BLD AUTO: 46.7 % (ref 36–48)
HDLC SERPL-MCNC: 62 MG/DL (ref 40–60)
HDLC SERPL: 2.7 (ref 0–5)
HGB BLD-MCNC: 14.5 G/DL (ref 13–16)
IMM GRANULOCYTES # BLD AUTO: 0 K/UL (ref 0–0.04)
IMM GRANULOCYTES NFR BLD AUTO: 0 % (ref 0–0.5)
LDLC SERPL CALC-MCNC: 88.4 MG/DL (ref 0–100)
LIPID PANEL: ABNORMAL
LYMPHOCYTES # BLD: 2.3 K/UL (ref 0.9–3.6)
LYMPHOCYTES NFR BLD: 40 % (ref 21–52)
MCH RBC QN AUTO: 26.9 PG (ref 24–34)
MCHC RBC AUTO-ENTMCNC: 31 G/DL (ref 31–37)
MCV RBC AUTO: 86.6 FL (ref 78–100)
MICROALBUMIN UR-MCNC: 0.6 MG/DL (ref 0–3)
MICROALBUMIN/CREAT UR-RTO: 3 MG/G (ref 0–30)
MONOCYTES # BLD: 0.5 K/UL (ref 0.05–1.2)
MONOCYTES NFR BLD: 8 % (ref 3–10)
NEUTS SEG # BLD: 2.8 K/UL (ref 1.8–8)
NEUTS SEG NFR BLD: 48 % (ref 40–73)
NRBC # BLD: 0 K/UL (ref 0–0.01)
NRBC BLD-RTO: 0 PER 100 WBC
PLATELET # BLD AUTO: 160 K/UL (ref 135–420)
PMV BLD AUTO: 13 FL (ref 9.2–11.8)
POTASSIUM SERPL-SCNC: 4.5 MMOL/L (ref 3.5–5.5)
RBC # BLD AUTO: 5.39 M/UL (ref 4.35–5.65)
SODIUM SERPL-SCNC: 141 MMOL/L (ref 136–145)
TRIGL SERPL-MCNC: 73 MG/DL
VLDLC SERPL CALC-MCNC: 14.6 MG/DL
WBC # BLD AUTO: 5.7 K/UL (ref 4.6–13.2)

## 2024-03-12 PROCEDURE — 83036 HEMOGLOBIN GLYCOSYLATED A1C: CPT

## 2024-03-12 PROCEDURE — 80048 BASIC METABOLIC PNL TOTAL CA: CPT

## 2024-03-12 PROCEDURE — 82043 UR ALBUMIN QUANTITATIVE: CPT

## 2024-03-12 PROCEDURE — 80061 LIPID PANEL: CPT

## 2024-03-12 PROCEDURE — 99214 OFFICE O/P EST MOD 30 MIN: CPT | Performed by: EMERGENCY MEDICINE

## 2024-03-12 PROCEDURE — 3080F DIAST BP >= 90 MM HG: CPT | Performed by: EMERGENCY MEDICINE

## 2024-03-12 PROCEDURE — 82570 ASSAY OF URINE CREATININE: CPT

## 2024-03-12 PROCEDURE — 36415 COLL VENOUS BLD VENIPUNCTURE: CPT

## 2024-03-12 PROCEDURE — 3075F SYST BP GE 130 - 139MM HG: CPT | Performed by: EMERGENCY MEDICINE

## 2024-03-12 PROCEDURE — 85025 COMPLETE CBC W/AUTO DIFF WBC: CPT

## 2024-03-12 RX ORDER — OMEPRAZOLE 20 MG/1
20 CAPSULE, DELAYED RELEASE ORAL
Qty: 90 CAPSULE | Refills: 3 | Status: SHIPPED | OUTPATIENT
Start: 2024-03-12

## 2024-03-12 RX ORDER — FLUTICASONE PROPIONATE 50 MCG
SPRAY, SUSPENSION (ML) NASAL
Qty: 16 G | Refills: 3 | Status: SHIPPED | OUTPATIENT
Start: 2024-03-12

## 2024-03-12 RX ORDER — LISINOPRIL AND HYDROCHLOROTHIAZIDE 25; 20 MG/1; MG/1
1 TABLET ORAL DAILY
Qty: 90 TABLET | Refills: 3 | Status: SHIPPED | OUTPATIENT
Start: 2024-03-12

## 2024-03-12 SDOH — ECONOMIC STABILITY: FOOD INSECURITY: WITHIN THE PAST 12 MONTHS, YOU WORRIED THAT YOUR FOOD WOULD RUN OUT BEFORE YOU GOT MONEY TO BUY MORE.: NEVER TRUE

## 2024-03-12 SDOH — ECONOMIC STABILITY: INCOME INSECURITY: HOW HARD IS IT FOR YOU TO PAY FOR THE VERY BASICS LIKE FOOD, HOUSING, MEDICAL CARE, AND HEATING?: NOT VERY HARD

## 2024-03-12 SDOH — ECONOMIC STABILITY: FOOD INSECURITY: WITHIN THE PAST 12 MONTHS, THE FOOD YOU BOUGHT JUST DIDN'T LAST AND YOU DIDN'T HAVE MONEY TO GET MORE.: NEVER TRUE

## 2024-03-12 ASSESSMENT — PATIENT HEALTH QUESTIONNAIRE - PHQ9
SUM OF ALL RESPONSES TO PHQ QUESTIONS 1-9: 0
6. FEELING BAD ABOUT YOURSELF - OR THAT YOU ARE A FAILURE OR HAVE LET YOURSELF OR YOUR FAMILY DOWN: 0
2. FEELING DOWN, DEPRESSED OR HOPELESS: 0
4. FEELING TIRED OR HAVING LITTLE ENERGY: 0
SUM OF ALL RESPONSES TO PHQ QUESTIONS 1-9: 0
8. MOVING OR SPEAKING SO SLOWLY THAT OTHER PEOPLE COULD HAVE NOTICED. OR THE OPPOSITE, BEING SO FIGETY OR RESTLESS THAT YOU HAVE BEEN MOVING AROUND A LOT MORE THAN USUAL: 0
SUM OF ALL RESPONSES TO PHQ QUESTIONS 1-9: 0
3. TROUBLE FALLING OR STAYING ASLEEP: 0
SUM OF ALL RESPONSES TO PHQ QUESTIONS 1-9: 0
7. TROUBLE CONCENTRATING ON THINGS, SUCH AS READING THE NEWSPAPER OR WATCHING TELEVISION: 0
9. THOUGHTS THAT YOU WOULD BE BETTER OFF DEAD, OR OF HURTING YOURSELF: 0
5. POOR APPETITE OR OVEREATING: 0
10. IF YOU CHECKED OFF ANY PROBLEMS, HOW DIFFICULT HAVE THESE PROBLEMS MADE IT FOR YOU TO DO YOUR WORK, TAKE CARE OF THINGS AT HOME, OR GET ALONG WITH OTHER PEOPLE: 0

## 2024-03-12 ASSESSMENT — ANXIETY QUESTIONNAIRES
5. BEING SO RESTLESS THAT IT IS HARD TO SIT STILL: 0
7. FEELING AFRAID AS IF SOMETHING AWFUL MIGHT HAPPEN: 0
4. TROUBLE RELAXING: 0
3. WORRYING TOO MUCH ABOUT DIFFERENT THINGS: 0
IF YOU CHECKED OFF ANY PROBLEMS ON THIS QUESTIONNAIRE, HOW DIFFICULT HAVE THESE PROBLEMS MADE IT FOR YOU TO DO YOUR WORK, TAKE CARE OF THINGS AT HOME, OR GET ALONG WITH OTHER PEOPLE: NOT DIFFICULT AT ALL
GAD7 TOTAL SCORE: 0
6. BECOMING EASILY ANNOYED OR IRRITABLE: 0
2. NOT BEING ABLE TO STOP OR CONTROL WORRYING: 0
1. FEELING NERVOUS, ANXIOUS, OR ON EDGE: 0

## 2024-03-17 DIAGNOSIS — R73.03 PREDIABETES: Primary | ICD-10-CM

## 2024-03-17 RX ORDER — METFORMIN HYDROCHLORIDE 500 MG/1
500 TABLET, EXTENDED RELEASE ORAL
Qty: 90 TABLET | Refills: 1 | Status: SHIPPED | OUTPATIENT
Start: 2024-03-17

## 2024-04-05 ENCOUNTER — TELEPHONE (OUTPATIENT)
Facility: CLINIC | Age: 34
End: 2024-04-05

## 2024-04-05 NOTE — TELEPHONE ENCOUNTER
Pt returning call from office. Pt stated provider office reached out to him, to go over lab results.    Advised PCP and Nurse out of office. Pt stated we can leave a detail message regarding results on Phone # [phone], if he does not answer the call

## 2024-05-02 NOTE — PROGRESS NOTES
Patient: Shorty Hickman Jr.                MRN: [id number]       SSN: [id number]  YOB: 1990        AGE: 34 y.o.        SEX: male      PCP: Jaya Rae MD  05/06/24    Chief Complaint   Patient presents with    Knee Pain     LEFT KNEE      HISTORY:  Shorty Hickman Jr. is a 34 y.o. male who is seen for increased left knee pain and swelling. He responded to his left knee injection last ov but his pains have returned. He notes pain with standing, walking, and stair climbing. He experiences startup pain after sitting. He sustained a severe left knee injury in June 2017. He fell into a ditch while walking. He sustained an ACL  tear, MCL tear, and a left femoral medial epicondylar avulsion fracture.  He underwent left ACL repair in [place] a month after injury.    He responded to his Visco-3 series on 10/1/21 and 6/24/23.  He has been experiencing knee pain for the past 2 years. His knee feels tight. He feels tenderness over his incision site. He has felt constant pain since his surgery. He notes intermittent left knee  swelling.     Occupation, etc:  Mr. Hickman receives social security disability benefits for his left knee. 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 He moved to this area from [place] to be closer to his mother in 2019. He  lives in [place] with his brother. He is not able to exercise much due to his knee pain. He has been swimming, walking, and using a treadmill for exercise  lately. He is a [place] fan. He is not diabetic or hypertensive.  Mr. Hickman  weighs 245 lbs and is 6'0\" tall. He lost 30 pounds after switching to a water diet and exercising daily but has gained some weight back.    Wt Readings from Last 3 Encounters:   05/06/24 111.1 kg (245 lb)   03/12/24 112 kg (247 lb)   10/07/23 106.6 kg (235 lb)      Body mass index is 36.18 kg/m².    Patient Active Problem List   Diagnosis    Severe obesity (HCC)       Social History

## 2024-05-06 ENCOUNTER — OFFICE VISIT (OUTPATIENT)
Age: 34
End: 2024-05-06
Payer: MEDICARE

## 2024-05-06 VITALS — HEIGHT: 69 IN | WEIGHT: 245 LBS | BODY MASS INDEX: 36.29 KG/M2 | TEMPERATURE: 97 F

## 2024-05-06 DIAGNOSIS — G89.29 CHRONIC PAIN OF LEFT KNEE: Primary | ICD-10-CM

## 2024-05-06 DIAGNOSIS — M17.32 UNILATERAL POST-TRAUMATIC OSTEOARTHRITIS, LEFT KNEE: ICD-10-CM

## 2024-05-06 DIAGNOSIS — M25.562 CHRONIC PAIN OF LEFT KNEE: Primary | ICD-10-CM

## 2024-05-06 PROCEDURE — 99213 OFFICE O/P EST LOW 20 MIN: CPT | Performed by: SPECIALIST

## 2024-05-06 PROCEDURE — 20610 DRAIN/INJ JOINT/BURSA W/O US: CPT | Performed by: SPECIALIST

## 2024-05-06 RX ORDER — BETAMETHASONE SODIUM PHOSPHATE AND BETAMETHASONE ACETATE 3; 3 MG/ML; MG/ML
3 INJECTION, SUSPENSION INTRA-ARTICULAR; INTRALESIONAL; INTRAMUSCULAR; SOFT TISSUE ONCE
Status: COMPLETED | OUTPATIENT
Start: 2024-05-06 | End: 2024-05-06

## 2024-05-06 RX ORDER — BUPIVACAINE HYDROCHLORIDE 5 MG/ML
4 INJECTION, SOLUTION PERINEURAL ONCE
Status: COMPLETED | OUTPATIENT
Start: 2024-05-06 | End: 2024-05-06

## 2024-05-06 RX ADMIN — BUPIVACAINE HYDROCHLORIDE 20 MG: 5 INJECTION, SOLUTION PERINEURAL at 16:05

## 2024-05-06 RX ADMIN — BETAMETHASONE SODIUM PHOSPHATE AND BETAMETHASONE ACETATE 3 MG: 3; 3 INJECTION, SUSPENSION INTRA-ARTICULAR; INTRALESIONAL; INTRAMUSCULAR; SOFT TISSUE at 16:04

## 2024-05-14 ENCOUNTER — TELEPHONE (OUTPATIENT)
Age: 34
End: 2024-05-14

## 2024-05-14 NOTE — TELEPHONE ENCOUNTER
Sudha from the [facility] auth dept called to speak to Eugenie about the prior auth they received for gel injections. Sudha is asking for a call back, and can be reached at [phone].

## 2024-05-14 NOTE — TELEPHONE ENCOUNTER
Spoke with Sudha with [facility],  informed writer that Visco 3 is not preferred any longer and that Euflexxa was a preferred medication.   Auth has been changed to Euflexxa at this time.  Awaiting final decision for Euflexxa.

## 2024-06-10 NOTE — PROGRESS NOTES
(247 lb)      Body mass index is 36.48 kg/m².    Patient Active Problem List   Diagnosis    Severe obesity (HCC)       Social History     Tobacco Use    Smoking status: Never    Smokeless tobacco: Never   Vaping Use    Vaping Use: Never used   Substance Use Topics    Alcohol use: Yes    Drug use: Never        No Known Allergies     Current Outpatient Medications   Medication Sig    metFORMIN (GLUCOPHAGE-XR) 500 MG extended release tablet Take 1 tablet by mouth daily (with breakfast)    fluticasone (FLONASE) 50 MCG/ACT nasal spray USE ONE SQUIRT IN EACH NOSTRIL DAILY    omeprazole (PRILOSEC) 20 MG delayed release capsule Take 1 capsule by mouth every morning (before breakfast)    lisinopril-hydroCHLOROthiazide (PRINZIDE;ZESTORETIC) 20-25 MG per tablet Take 1 tablet by mouth daily    Bismuth Tribromoph-Petrolatum (XEROFORM PETROLATUM DRES 5\"X9\") 3 % PADS Apply 1 patch topically daily    prazosin (MINIPRESS) 1 MG capsule TAKE 1 CAPSULE BY MOUTH AT EVERY BEDTIME.    buprenorphine (BUPRENEX) 5 MCG/HR PTWK APPLY 1 PATCH BY TRANSDERMAL ROUTE EVERY WEEK FOR 28 DAYS. 6/20/23    hydroCHLOROthiazide (HYDRODIURIL) 25 MG tablet Take 1 tablet by mouth every morning    clonazePAM (KLONOPIN) 0.5 MG tablet ceived the following from [facility]: Outside name: clonazePAM (KlonoPIN) 0.5 mg tablet    naproxen (NAPROSYN) 500 MG tablet Take 1 tablet by mouth 2 times daily (with meals)    pregabalin (LYRICA) 150 MG capsule ceived the following from [facility]: Outside name: pregabalin (LYRICA) 150 mg capsule    sertraline (ZOLOFT) 50 MG tablet Take by mouth daily    traMADol (ULTRAM) 50 MG tablet Take 1 tablet by mouth every 6 hours as needed.     Current Facility-Administered Medications   Medication Dose Route Frequency    sodium hyaluronate (SUPARTZ) injection 25 mg  25 mg Intra-artICUlar Once        PHYSICAL EXAMINATION:  Temp 97 °F (36.1 °C) (Temporal)   Ht 1.753 m (5' 9\")   Wt 112 kg (247 lb)   BMI

## 2024-06-13 ENCOUNTER — OFFICE VISIT (OUTPATIENT)
Age: 34
End: 2024-06-13
Payer: COMMERCIAL

## 2024-06-13 VITALS — TEMPERATURE: 97 F | HEIGHT: 69 IN | WEIGHT: 247 LBS | BODY MASS INDEX: 36.58 KG/M2

## 2024-06-13 DIAGNOSIS — M17.12 UNILATERAL PRIMARY OSTEOARTHRITIS, LEFT KNEE: Primary | ICD-10-CM

## 2024-06-13 PROCEDURE — 20610 DRAIN/INJ JOINT/BURSA W/O US: CPT | Performed by: SPECIALIST

## 2024-06-19 NOTE — PROGRESS NOTES
Patient: Shorty Hickman Jr.                MRN: [id number]       SSN: [id number]  YOB: 1990        AGE: 34 y.o.        SEX: male  There is no height or weight on file to calculate BMI.    PCP: Jaya Rae MD  06/21/24    Chief Complaint   Patient presents with    Knee Pain     Left knee Visco 3 #2     HISTORY:  Shorty Hickman Jr. is a 34 y.o. male who is seen for left knee pain. He presents today for his second injection in the Visco-3 visco supplementation series.      ICD-10-CM    1. Unilateral primary osteoarthritis, left knee  M17.12 DRAIN/INJECT LARGE JOINT/BURSA     sodium hyaluronate (SUPARTZ) injection 25 mg         PROCEDURE:  Mr. Valadez left knee injected with 2 cc of Visco-3.     Chart reviewed for the following:   Josr WISE MD, have reviewed the History, Physical and updated the Allergic reactions for Shorty Hickman Jr.     TIME OUT performed immediately prior to start of procedure:  Josr WISE MD, have performed the following reviews on Shorty Hickman Jr. prior to the start of the procedure:            * Patient was identified by name and date of birth   * Agreement on procedure being performed was verified  * Risks and Benefits explained to the patient  * Procedure site verified and marked as necessary  * Patient was positioned for comfort  * Consent was obtained     Time: 11:28 AM     Date of procedure: 6/21/2024    Procedure performed by:  Josr Thurston MD    Mr. Hickman tolerated the procedure well with no complications.    PLAN: Mr. Hickman's left knee injected with 2 cc of Visco-3. Mr. Hickman will follow up in one week to complete his visco supplementation injection series.    Documentation by jeromy Jiménez, as documented by Josr Thurston MD.

## 2024-06-21 ENCOUNTER — OFFICE VISIT (OUTPATIENT)
Age: 34
End: 2024-06-21
Payer: COMMERCIAL

## 2024-06-21 VITALS — TEMPERATURE: 97.1 F

## 2024-06-21 DIAGNOSIS — M17.12 UNILATERAL PRIMARY OSTEOARTHRITIS, LEFT KNEE: Primary | ICD-10-CM

## 2024-06-21 PROCEDURE — 20610 DRAIN/INJ JOINT/BURSA W/O US: CPT | Performed by: SPECIALIST

## 2024-06-24 NOTE — PROGRESS NOTES
Patient: Shorty Hickman Jr.                MRN: [id number]       SSN: [id number]  YOB: 1990        AGE: 34 y.o.        SEX: male  There is no height or weight on file to calculate BMI.    PCP: Jaya Rae MD  6/28/24      HISTORY:  Shorty Hickman Jr. is a 34 y.o. male who is seen for left knee pain. He presents today for his third injection in the Visco-3 visco supplementation series.    PROCEDURE:  Mr. Lazos left knee injected with 2 cc of Visco-3.         PLAN: Mr. Hickman's left knee injected with 2 cc of Visco-3.  Mr. Hickman will follow up PRN now that he has completed his visco supplementation injection series.     Chart reviewed for the following:   Jose WISE PA-C, have reviewed the History, Physical and updated the Allergic reactions for Shorty Hickman Jr.    TIME OUT performed immediately prior to start of procedure:   Jose WISE PA-C, have performed the following reviews on Shorty Hickman Jr. prior to the start of the procedure:            * Patient was identified by name and date of birth   * Agreement on procedure being performed was verified  * Risks and Benefits explained to the patient  * Procedure site verified and marked as necessary  * Patient was positioned for comfort  * Consent was signed and verified             Date of procedure: 06/28/24    Time: 12:15 PM    Procedure performed by:  Rubén ANN proctored by Jose Kramer PA-C    Provider assisted by: None     Patient assisted by: self    How tolerated by patient: tolerated the procedure well with no complications    Comments: none    The patient is asked to continue to rest the area for a few more days before resuming regular activities.  It may be more painful for the first 1-2 days.  Watch for fever, or increased swelling or persistent pain in the joint. Call or return to clinic prn if such symptoms occur or there is failure to improve as anticipated.      Post injection

## 2024-06-28 ENCOUNTER — OFFICE VISIT (OUTPATIENT)
Age: 34
End: 2024-06-28

## 2024-06-28 VITALS — WEIGHT: 247 LBS | HEIGHT: 69 IN | BODY MASS INDEX: 36.58 KG/M2 | TEMPERATURE: 97.8 F

## 2024-06-28 DIAGNOSIS — M17.12 UNILATERAL PRIMARY OSTEOARTHRITIS, LEFT KNEE: Primary | ICD-10-CM

## 2024-07-21 NOTE — PATIENT INSTRUCTIONS
following the DASH diet can lower blood pressure even further than just the DASH diet alone.  Follow-up care is a key part of your treatment and safety. Be sure to make and go to all appointments, and call your doctor if you are having problems. It's also a good idea to know your test results and keep a list of the medicines you take.  How can you care for yourself at home?  Following the DASH diet  Eat 4 to 5 servings of fruit each day. A serving is 1 medium-sized piece of fruit, ½ cup chopped or canned fruit, 1/4 cup dried fruit, or 4 ounces (½ cup) of fruit juice. Choose fruit more often than fruit juice.  Eat 4 to 5 servings of vegetables each day. A serving is 1 cup of lettuce or raw leafy vegetables, ½ cup of chopped or cooked vegetables, or 4 ounces (½ cup) of vegetable juice. Choose vegetables more often than vegetable juice.  Get 2 to 3 servings of low-fat and fat-free dairy each day. A serving is 8 ounces of milk, 1 cup of yogurt, or 1 ½ ounces of cheese.  Eat 6 to 8 servings of grains each day. A serving is 1 slice of bread, 1 ounce of dry cereal, or ½ cup of cooked rice, pasta, or cooked cereal. Try to choose whole-grain products as much as possible.  Limit lean meat, poultry, and fish to 2 servings each day. A serving is 3 ounces, about the size of a deck of cards.  Eat 4 to 5 servings of nuts, seeds, and legumes (cooked dried beans, lentils, and split peas) each week. A serving is 1/3 cup of nuts, 2 tablespoons of seeds, or ½ cup of cooked beans or peas.  Limit fats and oils to 2 to 3 servings each day. A serving is 1 teaspoon of vegetable oil or 2 tablespoons of salad dressing.  Limit sweets and added sugars to 5 servings or less a week. A serving is 1 tablespoon jelly or jam, ½ cup sorbet, or 1 cup of lemonade.  Eat less than 2,300 milligrams (mg) of sodium a day. If you limit your sodium to 1,500 mg a day, you can lower your blood pressure even more.  Be aware that all of these are the suggested

## 2024-07-21 NOTE — PROGRESS NOTES
Assessment & Plan      Left knee pain.  continues to experience knee pain and is receiving injections from her orthopedic specialist. No new interventions are planned at this time.    1. Essential hypertension  Comments:     blood pressure readings are slightly elevated today at 137/105. She is currently on Zestoretic. The plan is to maintain her on the existing Zestoretic regimen and monitor her blood pressure closely. If the blood pressure remains high at the next visit, an additional medication will be considered.  Orders:  -     lisinopril-hydroCHLOROthiazide (PRINZIDE;ZESTORETIC) 20-25 MG per tablet; Take 1 tablet by mouth daily, Disp-90 tablet, R-3Normal  2. Class 1 obesity due to excess calories without serious comorbidity with body mass index (BMI) of 32.0 to 32.9 in adult  The patient e reports gaining weight despite eating little and drinking a lot of water. Phentermine has been prescribed to address her weight gain. If phentermine proves ineffective, Qsymia will be considered as an alternative. If Qsymia is also ineffective, other medications will be explored.  -     phentermine 37.5 MG capsule; Take 1 capsule by mouth every morning for 30 days. Max Daily Amount: 37.5 mg, Disp-30 capsule, R-0Normal  3. Prediabetes  Comments:  Previous hemoglobin A1c 6.4 March 2024 recheck this visit after adjusting diet previously   last A1c was 6.4 in March, indicating prediabetes. A recheck of her blood sugar levels is planned for today to monitor her condition.  4. Chronic pain of left knee  Comments:  Still present but improved post Visco injections.  Follow along with specialist.  5. Anxiety with depression  Comments:  There are no new signs or symptoms.  There are no new aggravating or relieving factors.  There are no reported side effects from the medication/treatment.  6. Esophagitis  Comments:  There are no new signs or symptoms.  There are no new aggravating or relieving factors.  There are no reported side

## 2024-07-29 ENCOUNTER — OFFICE VISIT (OUTPATIENT)
Facility: CLINIC | Age: 34
End: 2024-07-29
Payer: COMMERCIAL

## 2024-07-29 ENCOUNTER — HOSPITAL ENCOUNTER (OUTPATIENT)
Facility: HOSPITAL | Age: 34
Setting detail: SPECIMEN
Discharge: HOME | End: 2024-08-01
Payer: COMMERCIAL

## 2024-07-29 VITALS
BODY MASS INDEX: 37.77 KG/M2 | HEART RATE: 79 BPM | RESPIRATION RATE: 18 BRPM | SYSTOLIC BLOOD PRESSURE: 137 MMHG | OXYGEN SATURATION: 96 % | WEIGHT: 255 LBS | DIASTOLIC BLOOD PRESSURE: 105 MMHG | HEIGHT: 69 IN

## 2024-07-29 DIAGNOSIS — G89.29 CHRONIC PAIN OF LEFT KNEE: ICD-10-CM

## 2024-07-29 DIAGNOSIS — R73.03 PREDIABETES: ICD-10-CM

## 2024-07-29 DIAGNOSIS — F41.8 ANXIETY WITH DEPRESSION: ICD-10-CM

## 2024-07-29 DIAGNOSIS — K20.90 ESOPHAGITIS: ICD-10-CM

## 2024-07-29 DIAGNOSIS — M25.562 CHRONIC PAIN OF LEFT KNEE: ICD-10-CM

## 2024-07-29 DIAGNOSIS — I10 ESSENTIAL HYPERTENSION: Primary | ICD-10-CM

## 2024-07-29 DIAGNOSIS — E66.09 CLASS 1 OBESITY DUE TO EXCESS CALORIES WITHOUT SERIOUS COMORBIDITY WITH BODY MASS INDEX (BMI) OF 32.0 TO 32.9 IN ADULT: ICD-10-CM

## 2024-07-29 DIAGNOSIS — T31.10 BURNS INVOLVING 10-19% OF BODY SURFACE: ICD-10-CM

## 2024-07-29 LAB
ALBUMIN SERPL-MCNC: 3.9 G/DL (ref 3.4–5)
ALBUMIN/GLOB SERPL: 0.9 (ref 0.8–1.7)
ALP SERPL-CCNC: 87 U/L (ref 45–117)
ALT SERPL-CCNC: 98 U/L (ref 16–61)
ANION GAP SERPL CALC-SCNC: 6 MMOL/L (ref 3–18)
AST SERPL-CCNC: 51 U/L (ref 10–38)
BASOPHILS # BLD: 0 K/UL (ref 0–0.1)
BASOPHILS NFR BLD: 1 % (ref 0–2)
BILIRUB SERPL-MCNC: 0.3 MG/DL (ref 0.2–1)
BUN SERPL-MCNC: 13 MG/DL (ref 7–18)
BUN/CREAT SERPL: 16 (ref 12–20)
CALCIUM SERPL-MCNC: 9.5 MG/DL (ref 8.5–10.1)
CHLORIDE SERPL-SCNC: 105 MMOL/L (ref 100–111)
CO2 SERPL-SCNC: 27 MMOL/L (ref 21–32)
CREAT SERPL-MCNC: 0.8 MG/DL (ref 0.6–1.3)
CREAT UR-MCNC: 97 MG/DL (ref 30–125)
DIFFERENTIAL METHOD BLD: ABNORMAL
EOSINOPHIL # BLD: 0.1 K/UL (ref 0–0.4)
EOSINOPHIL NFR BLD: 3 % (ref 0–5)
ERYTHROCYTE [DISTWIDTH] IN BLOOD BY AUTOMATED COUNT: 13.4 % (ref 11.6–14.5)
EST. AVERAGE GLUCOSE BLD GHB EST-MCNC: 126 MG/DL
GLOBULIN SER CALC-MCNC: 4.4 G/DL (ref 2–4)
GLUCOSE SERPL-MCNC: 113 MG/DL (ref 74–99)
HBA1C MFR BLD: 6 % (ref 4.2–5.6)
HCT VFR BLD AUTO: 48.4 % (ref 36–48)
HGB BLD-MCNC: 15.6 G/DL (ref 13–16)
IMM GRANULOCYTES # BLD AUTO: 0 K/UL (ref 0–0.04)
IMM GRANULOCYTES NFR BLD AUTO: 0 % (ref 0–0.5)
LYMPHOCYTES # BLD: 2.6 K/UL (ref 0.9–3.6)
LYMPHOCYTES NFR BLD: 58 % (ref 21–52)
MCH RBC QN AUTO: 29.5 PG (ref 24–34)
MCHC RBC AUTO-ENTMCNC: 32.2 G/DL (ref 31–37)
MCV RBC AUTO: 91.5 FL (ref 78–100)
MICROALBUMIN UR-MCNC: <0.5 MG/DL (ref 0–3)
MICROALBUMIN/CREAT UR-RTO: NORMAL MG/G (ref 0–30)
MONOCYTES # BLD: 0.4 K/UL (ref 0.05–1.2)
MONOCYTES NFR BLD: 8 % (ref 3–10)
NEUTS SEG # BLD: 1.4 K/UL (ref 1.8–8)
NEUTS SEG NFR BLD: 30 % (ref 40–73)
NRBC # BLD: 0 K/UL (ref 0–0.01)
NRBC BLD-RTO: 0 PER 100 WBC
PLATELET # BLD AUTO: 175 K/UL (ref 135–420)
PMV BLD AUTO: 12.9 FL (ref 9.2–11.8)
POTASSIUM SERPL-SCNC: 4.5 MMOL/L (ref 3.5–5.5)
PROT SERPL-MCNC: 8.3 G/DL (ref 6.4–8.2)
RBC # BLD AUTO: 5.29 M/UL (ref 4.35–5.65)
SODIUM SERPL-SCNC: 138 MMOL/L (ref 136–145)
WBC # BLD AUTO: 4.5 K/UL (ref 4.6–13.2)

## 2024-07-29 PROCEDURE — 36415 COLL VENOUS BLD VENIPUNCTURE: CPT

## 2024-07-29 PROCEDURE — 3080F DIAST BP >= 90 MM HG: CPT | Performed by: EMERGENCY MEDICINE

## 2024-07-29 PROCEDURE — 83036 HEMOGLOBIN GLYCOSYLATED A1C: CPT

## 2024-07-29 PROCEDURE — 82043 UR ALBUMIN QUANTITATIVE: CPT

## 2024-07-29 PROCEDURE — 85025 COMPLETE CBC W/AUTO DIFF WBC: CPT

## 2024-07-29 PROCEDURE — 80053 COMPREHEN METABOLIC PANEL: CPT

## 2024-07-29 PROCEDURE — 82570 ASSAY OF URINE CREATININE: CPT

## 2024-07-29 PROCEDURE — 3075F SYST BP GE 130 - 139MM HG: CPT | Performed by: EMERGENCY MEDICINE

## 2024-07-29 PROCEDURE — 99214 OFFICE O/P EST MOD 30 MIN: CPT | Performed by: EMERGENCY MEDICINE

## 2024-07-29 RX ORDER — PHENTERMINE HYDROCHLORIDE 37.5 MG/1
37.5 CAPSULE ORAL EVERY MORNING
Qty: 30 CAPSULE | Refills: 0 | Status: SHIPPED | OUTPATIENT
Start: 2024-07-29 | End: 2024-08-28

## 2024-07-29 RX ORDER — LISINOPRIL AND HYDROCHLOROTHIAZIDE 25; 20 MG/1; MG/1
1 TABLET ORAL DAILY
Qty: 90 TABLET | Refills: 3 | Status: SHIPPED | OUTPATIENT
Start: 2024-07-29

## 2024-07-29 SDOH — ECONOMIC STABILITY: FOOD INSECURITY: WITHIN THE PAST 12 MONTHS, THE FOOD YOU BOUGHT JUST DIDN'T LAST AND YOU DIDN'T HAVE MONEY TO GET MORE.: NEVER TRUE

## 2024-07-29 SDOH — ECONOMIC STABILITY: FOOD INSECURITY: WITHIN THE PAST 12 MONTHS, YOU WORRIED THAT YOUR FOOD WOULD RUN OUT BEFORE YOU GOT MONEY TO BUY MORE.: NEVER TRUE

## 2024-07-29 SDOH — ECONOMIC STABILITY: INCOME INSECURITY: HOW HARD IS IT FOR YOU TO PAY FOR THE VERY BASICS LIKE FOOD, HOUSING, MEDICAL CARE, AND HEATING?: NOT VERY HARD

## 2024-07-29 ASSESSMENT — ANXIETY QUESTIONNAIRES
IF YOU CHECKED OFF ANY PROBLEMS ON THIS QUESTIONNAIRE, HOW DIFFICULT HAVE THESE PROBLEMS MADE IT FOR YOU TO DO YOUR WORK, TAKE CARE OF THINGS AT HOME, OR GET ALONG WITH OTHER PEOPLE: NOT DIFFICULT AT ALL
4. TROUBLE RELAXING: NOT AT ALL
5. BEING SO RESTLESS THAT IT IS HARD TO SIT STILL: NOT AT ALL
1. FEELING NERVOUS, ANXIOUS, OR ON EDGE: NOT AT ALL
6. BECOMING EASILY ANNOYED OR IRRITABLE: NOT AT ALL
7. FEELING AFRAID AS IF SOMETHING AWFUL MIGHT HAPPEN: NOT AT ALL
3. WORRYING TOO MUCH ABOUT DIFFERENT THINGS: NOT AT ALL
2. NOT BEING ABLE TO STOP OR CONTROL WORRYING: NOT AT ALL
GAD7 TOTAL SCORE: 0

## 2024-07-29 ASSESSMENT — PATIENT HEALTH QUESTIONNAIRE - PHQ9
SUM OF ALL RESPONSES TO PHQ QUESTIONS 1-9: 0
4. FEELING TIRED OR HAVING LITTLE ENERGY: NOT AT ALL
SUM OF ALL RESPONSES TO PHQ9 QUESTIONS 1 & 2: 0
SUM OF ALL RESPONSES TO PHQ QUESTIONS 1-9: 0
1. LITTLE INTEREST OR PLEASURE IN DOING THINGS: NOT AT ALL
7. TROUBLE CONCENTRATING ON THINGS, SUCH AS READING THE NEWSPAPER OR WATCHING TELEVISION: NOT AT ALL
9. THOUGHTS THAT YOU WOULD BE BETTER OFF DEAD, OR OF HURTING YOURSELF: NOT AT ALL
SUM OF ALL RESPONSES TO PHQ QUESTIONS 1-9: 0
SUM OF ALL RESPONSES TO PHQ QUESTIONS 1-9: 0
3. TROUBLE FALLING OR STAYING ASLEEP: NOT AT ALL
2. FEELING DOWN, DEPRESSED OR HOPELESS: NOT AT ALL
6. FEELING BAD ABOUT YOURSELF - OR THAT YOU ARE A FAILURE OR HAVE LET YOURSELF OR YOUR FAMILY DOWN: NOT AT ALL
10. IF YOU CHECKED OFF ANY PROBLEMS, HOW DIFFICULT HAVE THESE PROBLEMS MADE IT FOR YOU TO DO YOUR WORK, TAKE CARE OF THINGS AT HOME, OR GET ALONG WITH OTHER PEOPLE: NOT DIFFICULT AT ALL
5. POOR APPETITE OR OVEREATING: NOT AT ALL
8. MOVING OR SPEAKING SO SLOWLY THAT OTHER PEOPLE COULD HAVE NOTICED. OR THE OPPOSITE, BEING SO FIGETY OR RESTLESS THAT YOU HAVE BEEN MOVING AROUND A LOT MORE THAN USUAL: NOT AT ALL

## 2024-08-01 DIAGNOSIS — R77.9 ELEVATED SERUM PROTEIN LEVEL: Primary | ICD-10-CM

## 2024-08-01 DIAGNOSIS — I10 ESSENTIAL HYPERTENSION: ICD-10-CM

## 2024-08-01 DIAGNOSIS — R73.03 PREDIABETES: ICD-10-CM

## 2024-08-01 NOTE — RESULT ENCOUNTER NOTE
Please call.  Hemoglobin A1c 6 previously 6.4.  Both less than the target of less than 7.  Now in the prediabetic range continue present treatment present diet.  Will recheck it before the next visit.  Slightly elevated protein in the in the blood.  Will check the protein level next time.  Unsure of significance.  Complete blood count is unremarkable  Labs before next visit

## 2024-09-13 DIAGNOSIS — R73.03 PREDIABETES: ICD-10-CM

## 2024-09-17 RX ORDER — METFORMIN HCL 500 MG
500 TABLET, EXTENDED RELEASE 24 HR ORAL
Qty: 90 TABLET | Refills: 3 | Status: SHIPPED | OUTPATIENT
Start: 2024-09-17

## 2024-09-19 ENCOUNTER — TELEPHONE (OUTPATIENT)
Facility: CLINIC | Age: 34
End: 2024-09-19

## 2024-12-15 NOTE — PROGRESS NOTES
Abdominal:      General: Bowel sounds are normal. There is no distension.      Palpations: There is no mass.      Tenderness: There is no abdominal tenderness. There is no guarding.   Musculoskeletal:         General: No swelling, tenderness or deformity.      Cervical back: Normal range of motion and neck supple.      Comments: Well-healed knee scar   Lymphadenopathy:      Cervical: No cervical adenopathy.   Skin:     Coloration: Skin is not jaundiced.   Neurological:      Mental Status: He is alert.      Cranial Nerves: No cranial nerve deficit.      Motor: No weakness.   Psychiatric:         Mood and Affect: Mood normal.         Behavior: Behavior normal.            This note was done with the assistance of dragon speech software.  Some inadvertent errors or omissions may be present

## 2024-12-16 ENCOUNTER — OFFICE VISIT (OUTPATIENT)
Facility: CLINIC | Age: 34
End: 2024-12-16
Payer: MEDICARE

## 2024-12-16 VITALS
HEART RATE: 80 BPM | BODY MASS INDEX: 37.18 KG/M2 | TEMPERATURE: 97.3 F | HEIGHT: 69 IN | SYSTOLIC BLOOD PRESSURE: 151 MMHG | WEIGHT: 251 LBS | OXYGEN SATURATION: 95 % | DIASTOLIC BLOOD PRESSURE: 95 MMHG | RESPIRATION RATE: 18 BRPM

## 2024-12-16 DIAGNOSIS — E66.811 CLASS 1 OBESITY DUE TO EXCESS CALORIES WITHOUT SERIOUS COMORBIDITY WITH BODY MASS INDEX (BMI) OF 32.0 TO 32.9 IN ADULT: ICD-10-CM

## 2024-12-16 DIAGNOSIS — E66.09 CLASS 1 OBESITY DUE TO EXCESS CALORIES WITHOUT SERIOUS COMORBIDITY WITH BODY MASS INDEX (BMI) OF 32.0 TO 32.9 IN ADULT: ICD-10-CM

## 2024-12-16 DIAGNOSIS — M25.562 CHRONIC PAIN OF LEFT KNEE: Primary | ICD-10-CM

## 2024-12-16 DIAGNOSIS — I10 ESSENTIAL HYPERTENSION: ICD-10-CM

## 2024-12-16 DIAGNOSIS — F41.8 ANXIETY WITH DEPRESSION: ICD-10-CM

## 2024-12-16 DIAGNOSIS — R73.03 PREDIABETES: ICD-10-CM

## 2024-12-16 DIAGNOSIS — T31.10 BURNS INVOLVING 10-19% OF BODY SURFACE: ICD-10-CM

## 2024-12-16 DIAGNOSIS — G89.29 CHRONIC PAIN OF LEFT KNEE: Primary | ICD-10-CM

## 2024-12-16 PROCEDURE — 3080F DIAST BP >= 90 MM HG: CPT | Performed by: EMERGENCY MEDICINE

## 2024-12-16 PROCEDURE — 3075F SYST BP GE 130 - 139MM HG: CPT | Performed by: EMERGENCY MEDICINE

## 2024-12-16 PROCEDURE — 99214 OFFICE O/P EST MOD 30 MIN: CPT | Performed by: EMERGENCY MEDICINE

## 2024-12-16 RX ORDER — LISINOPRIL 40 MG/1
40 TABLET ORAL DAILY
Qty: 90 TABLET | Refills: 1 | Status: SHIPPED | OUTPATIENT
Start: 2024-12-16

## 2024-12-16 RX ORDER — HYDROCHLOROTHIAZIDE 25 MG/1
25 TABLET ORAL EVERY MORNING
Qty: 90 TABLET | Refills: 1 | Status: SHIPPED | OUTPATIENT
Start: 2024-12-16

## 2024-12-16 SDOH — ECONOMIC STABILITY: FOOD INSECURITY: WITHIN THE PAST 12 MONTHS, YOU WORRIED THAT YOUR FOOD WOULD RUN OUT BEFORE YOU GOT MONEY TO BUY MORE.: NEVER TRUE

## 2024-12-16 SDOH — ECONOMIC STABILITY: FOOD INSECURITY: WITHIN THE PAST 12 MONTHS, THE FOOD YOU BOUGHT JUST DIDN'T LAST AND YOU DIDN'T HAVE MONEY TO GET MORE.: NEVER TRUE

## 2024-12-16 SDOH — ECONOMIC STABILITY: INCOME INSECURITY: HOW HARD IS IT FOR YOU TO PAY FOR THE VERY BASICS LIKE FOOD, HOUSING, MEDICAL CARE, AND HEATING?: NOT VERY HARD

## 2024-12-16 ASSESSMENT — PATIENT HEALTH QUESTIONNAIRE - PHQ9
SUM OF ALL RESPONSES TO PHQ QUESTIONS 1-9: 0
4. FEELING TIRED OR HAVING LITTLE ENERGY: NOT AT ALL
SUM OF ALL RESPONSES TO PHQ QUESTIONS 1-9: 0
SUM OF ALL RESPONSES TO PHQ QUESTIONS 1-9: 0
SUM OF ALL RESPONSES TO PHQ9 QUESTIONS 1 & 2: 0
7. TROUBLE CONCENTRATING ON THINGS, SUCH AS READING THE NEWSPAPER OR WATCHING TELEVISION: NOT AT ALL
8. MOVING OR SPEAKING SO SLOWLY THAT OTHER PEOPLE COULD HAVE NOTICED. OR THE OPPOSITE, BEING SO FIGETY OR RESTLESS THAT YOU HAVE BEEN MOVING AROUND A LOT MORE THAN USUAL: NOT AT ALL
1. LITTLE INTEREST OR PLEASURE IN DOING THINGS: NOT AT ALL
2. FEELING DOWN, DEPRESSED OR HOPELESS: NOT AT ALL
5. POOR APPETITE OR OVEREATING: NOT AT ALL
9. THOUGHTS THAT YOU WOULD BE BETTER OFF DEAD, OR OF HURTING YOURSELF: NOT AT ALL
SUM OF ALL RESPONSES TO PHQ QUESTIONS 1-9: 0
3. TROUBLE FALLING OR STAYING ASLEEP: NOT AT ALL
10. IF YOU CHECKED OFF ANY PROBLEMS, HOW DIFFICULT HAVE THESE PROBLEMS MADE IT FOR YOU TO DO YOUR WORK, TAKE CARE OF THINGS AT HOME, OR GET ALONG WITH OTHER PEOPLE: NOT DIFFICULT AT ALL
6. FEELING BAD ABOUT YOURSELF - OR THAT YOU ARE A FAILURE OR HAVE LET YOURSELF OR YOUR FAMILY DOWN: NOT AT ALL

## 2024-12-16 ASSESSMENT — ANXIETY QUESTIONNAIRES
IF YOU CHECKED OFF ANY PROBLEMS ON THIS QUESTIONNAIRE, HOW DIFFICULT HAVE THESE PROBLEMS MADE IT FOR YOU TO DO YOUR WORK, TAKE CARE OF THINGS AT HOME, OR GET ALONG WITH OTHER PEOPLE: NOT DIFFICULT AT ALL
1. FEELING NERVOUS, ANXIOUS, OR ON EDGE: NOT AT ALL
4. TROUBLE RELAXING: NOT AT ALL
5. BEING SO RESTLESS THAT IT IS HARD TO SIT STILL: NOT AT ALL
6. BECOMING EASILY ANNOYED OR IRRITABLE: NOT AT ALL
7. FEELING AFRAID AS IF SOMETHING AWFUL MIGHT HAPPEN: NOT AT ALL
GAD7 TOTAL SCORE: 0
3. WORRYING TOO MUCH ABOUT DIFFERENT THINGS: NOT AT ALL
2. NOT BEING ABLE TO STOP OR CONTROL WORRYING: NOT AT ALL

## 2024-12-16 NOTE — PATIENT INSTRUCTIONS
irritable. You may have a hard time concentrating.  Having headaches or muscle aches.  Having a hard time getting to sleep or staying asleep.  Panic disorder  You may have repeated panic attacks when there is no reason for feeling afraid. You may change your daily activities because you worry that you will have another attack.  Symptoms may include:  Intense fear, terror, or anxiety.  Trouble breathing or very fast breathing.  Chest pain or tightness.  A heartbeat that races or is not regular.  Social anxiety disorder  Symptoms may include:  Fear about a social situation, such as eating in front of others or speaking in public. You may worry a lot. Or you may be afraid that something bad will happen.  Anxiety that can cause you to blush, sweat, and feel shaky.  A heartbeat that is faster than normal.  A hard time focusing.  Phobias  Symptoms may include:  More fear than most people of being around an object, being in a situation, or doing an activity. You might also be stressed about the chance of being around the thing you fear.  Worry about losing control, panicking, fainting, or having physical symptoms like a faster heartbeat when you are around the situation or object.  How are these disorders treated?  Anxiety disorders can be treated with medicines or counseling. A combination of both may be used.  Medicines may include:  Antidepressants. These may help your symptoms by keeping chemicals in your brain in balance.  Benzodiazepines. These may give you short-term relief of your symptoms.  Some people use cognitive-behavioral therapy. A therapist helps you learn to change stressful or bad thoughts into helpful thoughts.  Lead a healthy lifestyle  A healthy lifestyle may help you feel better.  Get at least 30 minutes of exercise on most days of the week. Walking is a good choice.  Eat a healthy diet. Include fruits, vegetables, lean proteins, and whole grains in your diet each day.  Try to go to bed at the same

## 2025-04-11 NOTE — PROGRESS NOTES
Medicare Annual Wellness Visit    Shorty Hickman Jr. is here for No chief complaint on file.      Assessment & Plan   Chronic pain of left knee  Essential hypertension  Prediabetes  Class 1 obesity due to excess calories without serious comorbidity with body mass index (BMI) of 32.0 to 32.9 in adult  Anxiety with depression       Health Maintenance Due   Topic Date Due    Varicella vaccine (1 of 2 - 13+ 2-dose series) Never done    Hepatitis B vaccine (1 of 3 - 19+ 3-dose series) Never done    COVID-19 Vaccine (1 - 2024-25 season) Never done    Annual Wellness Visit (Medicare Advantage)  Never done       Recommendations for Preventive Services Due: see orders and patient instructions/AVS.  Recommended screening schedule for the next 5-10 years is provided to the patient in written form: see Patient Instructions/AVS.     No follow-ups on file.     Subjective   The primary encounter diagnosis was Chronic pain of left knee. Diagnoses of Essential hypertension, Prediabetes, Class 1 obesity due to excess calories without serious comorbidity with body mass index (BMI) of 32.0 to 32.9 in adult, and Anxiety with depression were also pertinent to this visit.     Patient's complete Health Risk Assessment and screening values have been reviewed and are found in Flowsheets. The following problems were reviewed today and where indicated follow up appointments were made and/or referrals ordered.    Positive Risk Factor Screenings with Interventions:          Controlled Medication Review:    Today's Pain Level: Pain Score: Zero     Opioid Risk: (Low risk score <55) Opioid risk score: 38    Patient is low risk for opioid use disorder or overdose.    Last PDMP Giancarlo as Reviewed:  Review User Review Instant Review Result                   Abnormal BMI (obese):  Body mass index is 35.29 kg/m². (!) Abnormal  Interventions:  low carbohydrate diet, exercise for at least 150 minutes/week          Vision Screen:  Do you have difficulty

## 2025-04-11 NOTE — PROGRESS NOTES
Assessment & Plan  Chronic pain of left knee     We will follow this along with the specialist that the patient sees for this problem.  No change in medication or treatment on our part at this time.       Essential hypertension    Will get back on medication regularly and then recheck the blood pressure next visit  BP Readings from Last 3 Encounters:   04/21/25 (!) 155/112   12/16/24 (!) 151/95   07/29/24 (!) 137/105     Hypertension Medications       ACE Inhibitors       lisinopril (PRINIVIL;ZESTRIL) 40 MG tablet Take 1 tablet by mouth daily       Antiadrenergic Antihypertensives       prazosin (MINIPRESS) 1 MG capsule TAKE 1 CAPSULE BY MOUTH AT EVERY BEDTIME.       Thiazides and Thiazide-Like Diuretics       hydroCHLOROthiazide (HYDRODIURIL) 25 MG tablet Take 1 tablet by mouth every morning             Orders:    Comprehensive Metabolic Panel; Future    CBC with Auto Differential; Future    Prediabetes   Chronic, at goal (stable), changes made today: None, continue to follow periodically.    Lab Results   Component Value Date    LABA1C 6.0 (H) 04/15/2025    LABA1C 6.0 (H) 07/29/2024    LABA1C 6.4 (H) 03/12/2024     Lab Results   Component Value Date    CREATININE 0.81 04/15/2025       Orders:    Albumin/Creatinine Ratio, Urine; Future    Hemoglobin A1C; Future    Class 1 obesity due to excess calories without serious comorbidity with body mass index (BMI) of 32.0 to 32.9 in adult   Chronic, at goal (stable), continue current treatment plan  Wt Readings from Last 3 Encounters:   04/21/25 108.4 kg (239 lb)   12/16/24 113.9 kg (251 lb)   07/29/24 115.7 kg (255 lb)            Anxiety with depression   Chronic, at goal (stable), continue current treatment plan         Elevated serum protein level     Noted on lab test.  SPEP does not show an M spike.  Continue to follow.         Initial Medicare annual wellness visit   The Medicare wellness visit was documented in a separate chart         .  We discussed continued

## 2025-04-11 NOTE — PATIENT INSTRUCTIONS
have questions about a medical condition or this instruction, always ask your healthcare professional. [employer], disclaims any warranty or liability for your use of this information.         Starting a Weight-Loss Plan: Care Instructions  Overview    It can be a challenge to lose weight. But your doctor can help you make a weight-loss plan that meets your needs.  You don't have to make a lot of big changes at once. A better idea might be to focus on small changes and stick with them. When those changes become habit, you can add a few more changes.  Some people find it helpful to take an exercise or nutrition class. If you have questions, ask your doctor about seeing a registered dietitian or an exercise specialist. You might also think about joining a weight-loss support group.  If you're not ready to make changes right now, try to pick a date in the future. Then make an appointment with your doctor to talk about when and how you'll get started with a plan.  Follow-up care is a key part of your treatment and safety. Be sure to make and go to all appointments, and call your doctor if you are having problems. It's also a good idea to know your test results and keep a list of the medicines you take.  How can you care for yourself as you start a weight-loss plan?  Set realistic goals. Many people expect to lose much more weight than is likely. A weight loss of 5% to 10% of your body weight may be enough to improve your health.  Get family and friends involved to provide support. Talk to them about why you are trying to lose weight, and ask them to help. They can help by participating in exercise and having meals with you, even if they may be eating something different.  Find what works best for you. If you do not have time or do not like to cook, a program that offers meal replacement bars or shakes may be better for you. Or if you like to prepare meals, finding a plan that includes daily menus and recipes

## 2025-04-15 ENCOUNTER — HOSPITAL ENCOUNTER (OUTPATIENT)
Facility: HOSPITAL | Age: 35
Discharge: HOME | End: 2025-04-18
Payer: MEDICARE

## 2025-04-15 DIAGNOSIS — R73.03 PREDIABETES: ICD-10-CM

## 2025-04-15 DIAGNOSIS — I10 ESSENTIAL HYPERTENSION: ICD-10-CM

## 2025-04-15 LAB
ALBUMIN SERPL-MCNC: 3.9 G/DL (ref 3.4–5)
ALBUMIN/GLOB SERPL: 0.8 (ref 0.8–1.7)
ALP SERPL-CCNC: 77 U/L (ref 45–117)
ALT SERPL-CCNC: 72 U/L (ref 16–61)
ANION GAP SERPL CALC-SCNC: 6 MMOL/L (ref 3–18)
AST SERPL-CCNC: 36 U/L (ref 10–38)
BASOPHILS # BLD: 0.02 K/UL (ref 0–0.1)
BASOPHILS NFR BLD: 0.4 % (ref 0–2)
BILIRUB SERPL-MCNC: 0.5 MG/DL (ref 0.2–1)
BUN SERPL-MCNC: 12 MG/DL (ref 7–18)
BUN/CREAT SERPL: 15 (ref 12–20)
CALCIUM SERPL-MCNC: 9.8 MG/DL (ref 8.5–10.1)
CHLORIDE SERPL-SCNC: 103 MMOL/L (ref 100–111)
CO2 SERPL-SCNC: 27 MMOL/L (ref 21–32)
CREAT SERPL-MCNC: 0.81 MG/DL (ref 0.6–1.3)
CREAT UR-MCNC: 344 MG/DL (ref 30–125)
DIFFERENTIAL METHOD BLD: ABNORMAL
EOSINOPHIL # BLD: 0.16 K/UL (ref 0–0.4)
EOSINOPHIL NFR BLD: 3.5 % (ref 0–5)
ERYTHROCYTE [DISTWIDTH] IN BLOOD BY AUTOMATED COUNT: 14.1 % (ref 11.6–14.5)
EST. AVERAGE GLUCOSE BLD GHB EST-MCNC: 126 MG/DL
GLOBULIN SER CALC-MCNC: 4.6 G/DL (ref 2–4)
GLUCOSE SERPL-MCNC: 104 MG/DL (ref 74–99)
HBA1C MFR BLD: 6 % (ref 4.2–5.6)
HCT VFR BLD AUTO: 44.8 % (ref 36–48)
HGB BLD-MCNC: 14.6 G/DL (ref 13–16)
IMM GRANULOCYTES # BLD AUTO: 0.01 K/UL (ref 0–0.04)
IMM GRANULOCYTES NFR BLD AUTO: 0.2 % (ref 0–0.5)
LYMPHOCYTES # BLD: 2.3 K/UL (ref 0.9–3.6)
LYMPHOCYTES NFR BLD: 50.1 % (ref 21–52)
MCH RBC QN AUTO: 30 PG (ref 24–34)
MCHC RBC AUTO-ENTMCNC: 32.6 G/DL (ref 31–37)
MCV RBC AUTO: 92 FL (ref 78–100)
MICROALBUMIN UR-MCNC: 1.08 MG/DL (ref 0–3)
MICROALBUMIN/CREAT UR-RTO: 3 MG/G (ref 0–30)
MONOCYTES # BLD: 0.39 K/UL (ref 0.05–1.2)
MONOCYTES NFR BLD: 8.5 % (ref 3–10)
NEUTS SEG # BLD: 1.71 K/UL (ref 1.8–8)
NEUTS SEG NFR BLD: 37.3 % (ref 40–73)
NRBC # BLD: 0 K/UL (ref 0–0.01)
NRBC BLD-RTO: 0 PER 100 WBC
PLATELET # BLD AUTO: 172 K/UL (ref 135–420)
PMV BLD AUTO: 11.5 FL (ref 9.2–11.8)
POTASSIUM SERPL-SCNC: 4.1 MMOL/L (ref 3.5–5.5)
PROT SERPL-MCNC: 8.5 G/DL (ref 6.4–8.2)
RBC # BLD AUTO: 4.87 M/UL (ref 4.35–5.65)
SODIUM SERPL-SCNC: 136 MMOL/L (ref 136–145)
WBC # BLD AUTO: 4.6 K/UL (ref 4.6–13.2)

## 2025-04-15 PROCEDURE — 36415 COLL VENOUS BLD VENIPUNCTURE: CPT

## 2025-04-15 PROCEDURE — 83036 HEMOGLOBIN GLYCOSYLATED A1C: CPT

## 2025-04-15 PROCEDURE — 82784 ASSAY IGA/IGD/IGG/IGM EACH: CPT

## 2025-04-15 PROCEDURE — 82043 UR ALBUMIN QUANTITATIVE: CPT

## 2025-04-15 PROCEDURE — 85025 COMPLETE CBC W/AUTO DIFF WBC: CPT

## 2025-04-15 PROCEDURE — 80053 COMPREHEN METABOLIC PANEL: CPT

## 2025-04-15 PROCEDURE — 86334 IMMUNOFIX E-PHORESIS SERUM: CPT

## 2025-04-15 PROCEDURE — 82570 ASSAY OF URINE CREATININE: CPT

## 2025-04-15 PROCEDURE — 84165 PROTEIN E-PHORESIS SERUM: CPT

## 2025-04-20 SDOH — ECONOMIC STABILITY: FOOD INSECURITY: WITHIN THE PAST 12 MONTHS, YOU WORRIED THAT YOUR FOOD WOULD RUN OUT BEFORE YOU GOT MONEY TO BUY MORE.: OFTEN TRUE

## 2025-04-20 SDOH — ECONOMIC STABILITY: FOOD INSECURITY: WITHIN THE PAST 12 MONTHS, THE FOOD YOU BOUGHT JUST DIDN'T LAST AND YOU DIDN'T HAVE MONEY TO GET MORE.: OFTEN TRUE

## 2025-04-20 SDOH — ECONOMIC STABILITY: TRANSPORTATION INSECURITY
IN THE PAST 12 MONTHS, HAS LACK OF TRANSPORTATION KEPT YOU FROM MEETINGS, WORK, OR FROM GETTING THINGS NEEDED FOR DAILY LIVING?: NO

## 2025-04-20 SDOH — ECONOMIC STABILITY: INCOME INSECURITY: IN THE LAST 12 MONTHS, WAS THERE A TIME WHEN YOU WERE NOT ABLE TO PAY THE MORTGAGE OR RENT ON TIME?: NO

## 2025-04-20 SDOH — ECONOMIC STABILITY: TRANSPORTATION INSECURITY
IN THE PAST 12 MONTHS, HAS THE LACK OF TRANSPORTATION KEPT YOU FROM MEDICAL APPOINTMENTS OR FROM GETTING MEDICATIONS?: NO

## 2025-04-21 ENCOUNTER — OFFICE VISIT (OUTPATIENT)
Facility: CLINIC | Age: 35
End: 2025-04-21
Payer: MEDICARE

## 2025-04-21 VITALS
OXYGEN SATURATION: 95 % | DIASTOLIC BLOOD PRESSURE: 112 MMHG | HEART RATE: 79 BPM | SYSTOLIC BLOOD PRESSURE: 155 MMHG | WEIGHT: 239 LBS | RESPIRATION RATE: 18 BRPM | HEIGHT: 69 IN | BODY MASS INDEX: 35.4 KG/M2

## 2025-04-21 DIAGNOSIS — I10 ESSENTIAL HYPERTENSION: ICD-10-CM

## 2025-04-21 DIAGNOSIS — Z00.00 INITIAL MEDICARE ANNUAL WELLNESS VISIT: Primary | ICD-10-CM

## 2025-04-21 DIAGNOSIS — F41.8 ANXIETY WITH DEPRESSION: ICD-10-CM

## 2025-04-21 DIAGNOSIS — R73.03 PREDIABETES: ICD-10-CM

## 2025-04-21 DIAGNOSIS — M25.562 CHRONIC PAIN OF LEFT KNEE: ICD-10-CM

## 2025-04-21 DIAGNOSIS — R77.9 ELEVATED SERUM PROTEIN LEVEL: ICD-10-CM

## 2025-04-21 DIAGNOSIS — E66.811 CLASS 1 OBESITY DUE TO EXCESS CALORIES WITHOUT SERIOUS COMORBIDITY WITH BODY MASS INDEX (BMI) OF 32.0 TO 32.9 IN ADULT: ICD-10-CM

## 2025-04-21 DIAGNOSIS — E66.09 CLASS 1 OBESITY DUE TO EXCESS CALORIES WITHOUT SERIOUS COMORBIDITY WITH BODY MASS INDEX (BMI) OF 32.0 TO 32.9 IN ADULT: ICD-10-CM

## 2025-04-21 DIAGNOSIS — G89.29 CHRONIC PAIN OF LEFT KNEE: ICD-10-CM

## 2025-04-21 LAB
ALBUMIN SERPL ELPH-MCNC: 4.1 G/DL (ref 2.9–4.4)
ALBUMIN/GLOB SERPL: 1.1 (ref 0.7–1.7)
ALPHA1 GLOB SERPL ELPH-MCNC: 0.2 G/DL (ref 0–0.4)
ALPHA2 GLOB SERPL ELPH-MCNC: 0.6 G/DL (ref 0.4–1)
B-GLOBULIN SERPL ELPH-MCNC: 1 G/DL (ref 0.7–1.3)
GAMMA GLOB SERPL ELPH-MCNC: 2.1 G/DL (ref 0.4–1.8)
GLOBULIN SER-MCNC: 3.9 G/DL (ref 2.2–3.9)
IGA SERPL-MCNC: <5 MG/DL (ref 90–386)
IGG SERPL-MCNC: 2563 MG/DL (ref 603–1613)
IGM SERPL-MCNC: 76 MG/DL (ref 20–172)
INTERPRETATION SERPL IEP-IMP: ABNORMAL
M PROTEIN SERPL ELPH-MCNC: ABNORMAL G/DL
PROT SERPL-MCNC: 8 G/DL (ref 6–8.5)

## 2025-04-21 PROCEDURE — G0438 PPPS, INITIAL VISIT: HCPCS | Performed by: EMERGENCY MEDICINE

## 2025-04-21 PROCEDURE — 3077F SYST BP >= 140 MM HG: CPT | Performed by: EMERGENCY MEDICINE

## 2025-04-21 PROCEDURE — 99212 OFFICE O/P EST SF 10 MIN: CPT | Performed by: EMERGENCY MEDICINE

## 2025-04-21 PROCEDURE — 3080F DIAST BP >= 90 MM HG: CPT | Performed by: EMERGENCY MEDICINE

## 2025-04-21 ASSESSMENT — ANXIETY QUESTIONNAIRES
4. TROUBLE RELAXING: NOT AT ALL
3. WORRYING TOO MUCH ABOUT DIFFERENT THINGS: NOT AT ALL
7. FEELING AFRAID AS IF SOMETHING AWFUL MIGHT HAPPEN: NOT AT ALL
IF YOU CHECKED OFF ANY PROBLEMS ON THIS QUESTIONNAIRE, HOW DIFFICULT HAVE THESE PROBLEMS MADE IT FOR YOU TO DO YOUR WORK, TAKE CARE OF THINGS AT HOME, OR GET ALONG WITH OTHER PEOPLE: NOT DIFFICULT AT ALL
1. FEELING NERVOUS, ANXIOUS, OR ON EDGE: SEVERAL DAYS
6. BECOMING EASILY ANNOYED OR IRRITABLE: SEVERAL DAYS
5. BEING SO RESTLESS THAT IT IS HARD TO SIT STILL: NOT AT ALL
GAD7 TOTAL SCORE: 2
2. NOT BEING ABLE TO STOP OR CONTROL WORRYING: NOT AT ALL

## 2025-04-21 ASSESSMENT — VISUAL ACUITY
OS_CC: 20/20
OD_CC: 20/20

## 2025-04-21 ASSESSMENT — LIFESTYLE VARIABLES
HOW MANY STANDARD DRINKS CONTAINING ALCOHOL DO YOU HAVE ON A TYPICAL DAY: PATIENT DOES NOT DRINK
HOW OFTEN DO YOU HAVE A DRINK CONTAINING ALCOHOL: NEVER

## 2025-04-21 ASSESSMENT — PATIENT HEALTH QUESTIONNAIRE - PHQ9
9. THOUGHTS THAT YOU WOULD BE BETTER OFF DEAD, OR OF HURTING YOURSELF: NOT AT ALL
SUM OF ALL RESPONSES TO PHQ QUESTIONS 1-9: 0
6. FEELING BAD ABOUT YOURSELF - OR THAT YOU ARE A FAILURE OR HAVE LET YOURSELF OR YOUR FAMILY DOWN: NOT AT ALL
3. TROUBLE FALLING OR STAYING ASLEEP: NOT AT ALL
4. FEELING TIRED OR HAVING LITTLE ENERGY: NOT AT ALL
SUM OF ALL RESPONSES TO PHQ QUESTIONS 1-9: 0
1. LITTLE INTEREST OR PLEASURE IN DOING THINGS: NOT AT ALL
SUM OF ALL RESPONSES TO PHQ QUESTIONS 1-9: 0
2. FEELING DOWN, DEPRESSED OR HOPELESS: NOT AT ALL
8. MOVING OR SPEAKING SO SLOWLY THAT OTHER PEOPLE COULD HAVE NOTICED. OR THE OPPOSITE, BEING SO FIGETY OR RESTLESS THAT YOU HAVE BEEN MOVING AROUND A LOT MORE THAN USUAL: NOT AT ALL
5. POOR APPETITE OR OVEREATING: NOT AT ALL
SUM OF ALL RESPONSES TO PHQ QUESTIONS 1-9: 0
10. IF YOU CHECKED OFF ANY PROBLEMS, HOW DIFFICULT HAVE THESE PROBLEMS MADE IT FOR YOU TO DO YOUR WORK, TAKE CARE OF THINGS AT HOME, OR GET ALONG WITH OTHER PEOPLE: NOT DIFFICULT AT ALL
7. TROUBLE CONCENTRATING ON THINGS, SUCH AS READING THE NEWSPAPER OR WATCHING TELEVISION: NOT AT ALL

## 2025-06-09 DIAGNOSIS — I10 ESSENTIAL HYPERTENSION: ICD-10-CM

## 2025-06-09 RX ORDER — LISINOPRIL 40 MG/1
40 TABLET ORAL DAILY
Qty: 90 TABLET | Refills: 3 | Status: SHIPPED | OUTPATIENT
Start: 2025-06-09

## 2025-06-14 DIAGNOSIS — I10 ESSENTIAL HYPERTENSION: ICD-10-CM

## 2025-06-14 DIAGNOSIS — K20.90 ESOPHAGITIS: ICD-10-CM

## 2025-06-16 RX ORDER — HYDROCHLOROTHIAZIDE 25 MG/1
25 TABLET ORAL EVERY MORNING
Qty: 90 TABLET | Refills: 3 | Status: SHIPPED | OUTPATIENT
Start: 2025-06-16

## 2025-06-16 RX ORDER — OMEPRAZOLE 20 MG/1
20 CAPSULE, DELAYED RELEASE ORAL
Qty: 90 CAPSULE | Refills: 3 | Status: SHIPPED | OUTPATIENT
Start: 2025-06-16

## 2025-07-18 NOTE — PROGRESS NOTES
back.    Wt Readings from Last 3 Encounters:   04/21/25 108.4 kg (239 lb)   12/16/24 113.9 kg (251 lb)   07/29/24 115.7 kg (255 lb)      There is no height or weight on file to calculate BMI.    Patient Active Problem List   Diagnosis    Severe obesity (HCC)       Social History     Tobacco Use    Smoking status: Never    Smokeless tobacco: Never   Vaping Use    Vaping status: Never Used   Substance Use Topics    Alcohol use: Yes    Drug use: Never        No Known Allergies     Current Outpatient Medications   Medication Sig    hydroCHLOROthiazide (HYDRODIURIL) 25 MG tablet TAKE 1 TABLET BY MOUTH EVERY DAY IN THE MORNING    omeprazole (PRILOSEC) 20 MG delayed release capsule TAKE 1 CAPSULE BY MOUTH EVERY DAY IN THE MORNING BEFORE BREAKFAST    lisinopril (PRINIVIL;ZESTRIL) 40 MG tablet TAKE 1 TABLET BY MOUTH EVERY DAY    metFORMIN (GLUCOPHAGE-XR) 500 MG extended release tablet TAKE 1 TABLET BY MOUTH EVERY DAY WITH BREAKFAST    fluticasone (FLONASE) 50 MCG/ACT nasal spray USE ONE SQUIRT IN EACH NOSTRIL DAILY    Bismuth Tribromoph-Petrolatum (XEROFORM PETROLATUM DRES 5\"X9\") 3 % PADS Apply 1 patch topically daily    prazosin (MINIPRESS) 1 MG capsule TAKE 1 CAPSULE BY MOUTH AT EVERY BEDTIME.    buprenorphine (BUPRENEX) 5 MCG/HR PTWK APPLY 1 PATCH BY TRANSDERMAL ROUTE EVERY WEEK FOR 28 DAYS. 6/20/23    clonazePAM (KLONOPIN) 0.5 MG tablet ceived the following from [facility]: Outside name: clonazePAM (KlonoPIN) 0.5 mg tablet    naproxen (NAPROSYN) 500 MG tablet Take 1 tablet by mouth 2 times daily (with meals)    pregabalin (LYRICA) 150 MG capsule ceived the following from [facility]: Outside name: pregabalin (LYRICA) 150 mg capsule    sertraline (ZOLOFT) 50 MG tablet Take by mouth daily    traMADol (ULTRAM) 50 MG tablet Take 1 tablet by mouth every 6 hours as needed.     No current facility-administered medications for this visit.        PHYSICAL EXAMINATION:  There were no vitals taken

## 2025-07-21 ENCOUNTER — OFFICE VISIT (OUTPATIENT)
Age: 35
End: 2025-07-21

## 2025-07-21 DIAGNOSIS — Z96.9 PRESENCE OF RETAINED HARDWARE: ICD-10-CM

## 2025-07-21 DIAGNOSIS — G89.29 CHRONIC PAIN OF LEFT KNEE: Primary | ICD-10-CM

## 2025-07-21 DIAGNOSIS — M25.562 CHRONIC PAIN OF LEFT KNEE: Primary | ICD-10-CM

## 2025-07-21 DIAGNOSIS — Z98.890 S/P ACL RECONSTRUCTION: ICD-10-CM

## 2025-07-21 DIAGNOSIS — M17.32 UNILATERAL POST-TRAUMATIC OSTEOARTHRITIS, LEFT KNEE: ICD-10-CM

## 2025-08-14 ENCOUNTER — TELEPHONE (OUTPATIENT)
Facility: CLINIC | Age: 35
End: 2025-08-14

## 2025-08-21 ENCOUNTER — HOSPITAL ENCOUNTER (OUTPATIENT)
Facility: HOSPITAL | Age: 35
Discharge: HOME | End: 2025-08-24
Payer: MEDICARE

## 2025-08-21 DIAGNOSIS — R73.03 PREDIABETES: ICD-10-CM

## 2025-08-21 DIAGNOSIS — I10 ESSENTIAL HYPERTENSION: ICD-10-CM

## 2025-08-21 LAB
ALBUMIN SERPL-MCNC: 4 G/DL (ref 3.4–5)
ALBUMIN/GLOB SERPL: 1.1 (ref 0.8–1.7)
ALP SERPL-CCNC: 69 U/L (ref 45–117)
ALT SERPL-CCNC: 77 U/L (ref 10–50)
ANION GAP SERPL CALC-SCNC: 11 MMOL/L (ref 3–18)
AST SERPL-CCNC: 45 U/L (ref 10–38)
BASOPHILS # BLD: 0.03 K/UL (ref 0–0.1)
BASOPHILS NFR BLD: 0.8 % (ref 0–2)
BILIRUB SERPL-MCNC: 0.4 MG/DL (ref 0.2–1)
BUN SERPL-MCNC: 8 MG/DL (ref 6–23)
BUN/CREAT SERPL: 10 (ref 12–20)
CALCIUM SERPL-MCNC: 9.7 MG/DL (ref 8.5–10.1)
CHLORIDE SERPL-SCNC: 104 MMOL/L (ref 98–107)
CO2 SERPL-SCNC: 23 MMOL/L (ref 21–32)
CREAT SERPL-MCNC: 0.78 MG/DL (ref 0.6–1.3)
CREAT UR-MCNC: 530 MG/DL (ref 30–125)
DIFFERENTIAL METHOD BLD: ABNORMAL
EOSINOPHIL # BLD: 0.09 K/UL (ref 0–0.4)
EOSINOPHIL NFR BLD: 2.3 % (ref 0–5)
ERYTHROCYTE [DISTWIDTH] IN BLOOD BY AUTOMATED COUNT: 13.2 % (ref 11.6–14.5)
EST. AVERAGE GLUCOSE BLD GHB EST-MCNC: 130 MG/DL
GLOBULIN SER CALC-MCNC: 3.6 G/DL (ref 2–4)
GLUCOSE SERPL-MCNC: 100 MG/DL (ref 74–108)
HBA1C MFR BLD: 6.2 % (ref 4.2–5.6)
HCT VFR BLD AUTO: 47.8 % (ref 36–48)
HGB BLD-MCNC: 15.2 G/DL (ref 13–16)
IMM GRANULOCYTES # BLD AUTO: 0 K/UL (ref 0–0.04)
IMM GRANULOCYTES NFR BLD AUTO: 0 % (ref 0–0.5)
LYMPHOCYTES # BLD: 2.08 K/UL (ref 0.9–3.6)
LYMPHOCYTES NFR BLD: 53.6 % (ref 21–52)
MCH RBC QN AUTO: 28.9 PG (ref 24–34)
MCHC RBC AUTO-ENTMCNC: 31.8 G/DL (ref 31–37)
MCV RBC AUTO: 90.9 FL (ref 78–100)
MICROALBUMIN UR-MCNC: 1.27 MG/DL (ref 0–3)
MICROALBUMIN/CREAT UR-RTO: 2 MG/G (ref 0–30)
MONOCYTES # BLD: 0.32 K/UL (ref 0.05–1.2)
MONOCYTES NFR BLD: 8.2 % (ref 3–10)
NEUTS SEG # BLD: 1.36 K/UL (ref 1.8–8)
NEUTS SEG NFR BLD: 35.1 % (ref 40–73)
NRBC # BLD: 0 K/UL (ref 0–0.01)
NRBC BLD-RTO: 0 PER 100 WBC
PLATELET # BLD AUTO: 218 K/UL (ref 135–420)
PMV BLD AUTO: 12.3 FL (ref 9.2–11.8)
POTASSIUM SERPL-SCNC: 4.5 MMOL/L (ref 3.5–5.5)
PROT SERPL-MCNC: 7.6 G/DL (ref 6.4–8.2)
RBC # BLD AUTO: 5.26 M/UL (ref 4.35–5.65)
SODIUM SERPL-SCNC: 138 MMOL/L (ref 136–145)
WBC # BLD AUTO: 3.9 K/UL (ref 4.6–13.2)

## 2025-08-21 PROCEDURE — 80053 COMPREHEN METABOLIC PANEL: CPT

## 2025-08-21 PROCEDURE — 85025 COMPLETE CBC W/AUTO DIFF WBC: CPT

## 2025-08-21 PROCEDURE — 83036 HEMOGLOBIN GLYCOSYLATED A1C: CPT

## 2025-08-21 PROCEDURE — 36415 COLL VENOUS BLD VENIPUNCTURE: CPT

## 2025-08-21 PROCEDURE — 82043 UR ALBUMIN QUANTITATIVE: CPT

## 2025-08-21 PROCEDURE — 82570 ASSAY OF URINE CREATININE: CPT

## 2025-08-22 PROBLEM — E66.09 CLASS 1 OBESITY DUE TO EXCESS CALORIES WITHOUT SERIOUS COMORBIDITY WITH BODY MASS INDEX (BMI) OF 32.0 TO 32.9 IN ADULT: Status: ACTIVE | Noted: 2025-08-22

## 2025-08-22 PROBLEM — R73.03 PREDIABETES: Status: ACTIVE | Noted: 2025-08-22

## 2025-08-22 PROBLEM — K20.90 ESOPHAGITIS: Status: ACTIVE | Noted: 2025-08-22

## 2025-08-22 PROBLEM — G89.29 CHRONIC PAIN OF LEFT KNEE: Status: ACTIVE | Noted: 2025-08-22

## 2025-08-22 PROBLEM — Z87.828 HISTORY OF BURNS: Status: ACTIVE | Noted: 2025-08-22

## 2025-08-22 PROBLEM — I10 ESSENTIAL HYPERTENSION: Status: ACTIVE | Noted: 2025-08-22

## 2025-08-22 PROBLEM — R77.9 ELEVATED SERUM PROTEIN LEVEL: Status: ACTIVE | Noted: 2025-08-22

## 2025-08-22 PROBLEM — E66.811 CLASS 1 OBESITY DUE TO EXCESS CALORIES WITHOUT SERIOUS COMORBIDITY WITH BODY MASS INDEX (BMI) OF 32.0 TO 32.9 IN ADULT: Status: ACTIVE | Noted: 2025-08-22

## 2025-08-22 PROBLEM — M25.562 CHRONIC PAIN OF LEFT KNEE: Status: ACTIVE | Noted: 2025-08-22

## 2025-08-25 ENCOUNTER — OFFICE VISIT (OUTPATIENT)
Facility: CLINIC | Age: 35
End: 2025-08-25
Payer: MEDICARE

## 2025-08-25 VITALS
RESPIRATION RATE: 18 BRPM | WEIGHT: 220 LBS | HEIGHT: 69 IN | HEART RATE: 78 BPM | TEMPERATURE: 97.8 F | SYSTOLIC BLOOD PRESSURE: 144 MMHG | OXYGEN SATURATION: 94 % | DIASTOLIC BLOOD PRESSURE: 99 MMHG | BODY MASS INDEX: 32.58 KG/M2

## 2025-08-25 DIAGNOSIS — R77.9 ELEVATED SERUM PROTEIN LEVEL: ICD-10-CM

## 2025-08-25 DIAGNOSIS — R79.89 ABNORMAL LIVER FUNCTION TEST: ICD-10-CM

## 2025-08-25 DIAGNOSIS — I10 ESSENTIAL HYPERTENSION: Primary | ICD-10-CM

## 2025-08-25 DIAGNOSIS — Z11.59 ENCOUNTER FOR SCREENING FOR OTHER VIRAL DISEASES: ICD-10-CM

## 2025-08-25 DIAGNOSIS — E66.811 CLASS 1 OBESITY DUE TO EXCESS CALORIES WITHOUT SERIOUS COMORBIDITY WITH BODY MASS INDEX (BMI) OF 32.0 TO 32.9 IN ADULT: ICD-10-CM

## 2025-08-25 DIAGNOSIS — Z87.828 HISTORY OF BURNS: ICD-10-CM

## 2025-08-25 DIAGNOSIS — Z11.59 NEED FOR HEPATITIS B SCREENING TEST: ICD-10-CM

## 2025-08-25 DIAGNOSIS — M25.562 CHRONIC PAIN OF LEFT KNEE: ICD-10-CM

## 2025-08-25 DIAGNOSIS — R79.89 ELEVATED LFTS: ICD-10-CM

## 2025-08-25 DIAGNOSIS — G89.29 CHRONIC PAIN OF LEFT KNEE: ICD-10-CM

## 2025-08-25 DIAGNOSIS — R73.03 PREDIABETES: ICD-10-CM

## 2025-08-25 DIAGNOSIS — E66.09 CLASS 1 OBESITY DUE TO EXCESS CALORIES WITHOUT SERIOUS COMORBIDITY WITH BODY MASS INDEX (BMI) OF 32.0 TO 32.9 IN ADULT: ICD-10-CM

## 2025-08-25 DIAGNOSIS — F41.8 ANXIETY WITH DEPRESSION: ICD-10-CM

## 2025-08-25 DIAGNOSIS — K20.90 ESOPHAGITIS: ICD-10-CM

## 2025-08-25 PROCEDURE — 3075F SYST BP GE 130 - 139MM HG: CPT | Performed by: EMERGENCY MEDICINE

## 2025-08-25 PROCEDURE — 99214 OFFICE O/P EST MOD 30 MIN: CPT | Performed by: EMERGENCY MEDICINE

## 2025-08-25 PROCEDURE — 3080F DIAST BP >= 90 MM HG: CPT | Performed by: EMERGENCY MEDICINE

## 2025-08-25 RX ORDER — LISINOPRIL AND HYDROCHLOROTHIAZIDE 20; 25 MG/1; MG/1
1 TABLET ORAL DAILY
Qty: 90 TABLET | Refills: 1 | Status: SHIPPED | OUTPATIENT
Start: 2025-08-25

## 2025-08-25 RX ORDER — AMLODIPINE BESYLATE 2.5 MG/1
2.5 TABLET ORAL DAILY
Qty: 90 TABLET | Refills: 1 | Status: SHIPPED | OUTPATIENT
Start: 2025-08-25

## 2025-08-25 SDOH — ECONOMIC STABILITY: FOOD INSECURITY: WITHIN THE PAST 12 MONTHS, THE FOOD YOU BOUGHT JUST DIDN'T LAST AND YOU DIDN'T HAVE MONEY TO GET MORE.: NEVER TRUE

## 2025-08-25 SDOH — ECONOMIC STABILITY: FOOD INSECURITY: WITHIN THE PAST 12 MONTHS, YOU WORRIED THAT YOUR FOOD WOULD RUN OUT BEFORE YOU GOT MONEY TO BUY MORE.: NEVER TRUE

## 2025-08-25 ASSESSMENT — PATIENT HEALTH QUESTIONNAIRE - PHQ9
5. POOR APPETITE OR OVEREATING: NOT AT ALL
6. FEELING BAD ABOUT YOURSELF - OR THAT YOU ARE A FAILURE OR HAVE LET YOURSELF OR YOUR FAMILY DOWN: NOT AT ALL
2. FEELING DOWN, DEPRESSED OR HOPELESS: NOT AT ALL
10. IF YOU CHECKED OFF ANY PROBLEMS, HOW DIFFICULT HAVE THESE PROBLEMS MADE IT FOR YOU TO DO YOUR WORK, TAKE CARE OF THINGS AT HOME, OR GET ALONG WITH OTHER PEOPLE: NOT DIFFICULT AT ALL
SUM OF ALL RESPONSES TO PHQ QUESTIONS 1-9: 0
SUM OF ALL RESPONSES TO PHQ QUESTIONS 1-9: 0
1. LITTLE INTEREST OR PLEASURE IN DOING THINGS: NOT AT ALL
9. THOUGHTS THAT YOU WOULD BE BETTER OFF DEAD, OR OF HURTING YOURSELF: NOT AT ALL
SUM OF ALL RESPONSES TO PHQ QUESTIONS 1-9: 0
3. TROUBLE FALLING OR STAYING ASLEEP: NOT AT ALL
7. TROUBLE CONCENTRATING ON THINGS, SUCH AS READING THE NEWSPAPER OR WATCHING TELEVISION: NOT AT ALL
4. FEELING TIRED OR HAVING LITTLE ENERGY: NOT AT ALL
SUM OF ALL RESPONSES TO PHQ QUESTIONS 1-9: 0
8. MOVING OR SPEAKING SO SLOWLY THAT OTHER PEOPLE COULD HAVE NOTICED. OR THE OPPOSITE, BEING SO FIGETY OR RESTLESS THAT YOU HAVE BEEN MOVING AROUND A LOT MORE THAN USUAL: NOT AT ALL